# Patient Record
Sex: FEMALE | Employment: OTHER | URBAN - METROPOLITAN AREA
[De-identification: names, ages, dates, MRNs, and addresses within clinical notes are randomized per-mention and may not be internally consistent; named-entity substitution may affect disease eponyms.]

---

## 2017-07-27 RX ORDER — DICLOFENAC SODIUM 10 MG/G
2 GEL TOPICAL 4 TIMES DAILY
Qty: 300 G | Refills: 1 | Status: SHIPPED | OUTPATIENT
Start: 2017-07-27 | End: 2017-12-26 | Stop reason: SDUPTHER

## 2017-07-27 NOTE — TELEPHONE ENCOUNTER
Last Visit: 06/16/2016 with MD Rolan Owens    Next Appointment: noted to f/u prn   Previous Refill Encounters: 09/15/2016 per DEJON Balderas 300g with 1 refill     Requested Prescriptions     Pending Prescriptions Disp Refills    diclofenac (VOLTAREN) 1 % gel 300 g 1     Sig: Apply 2 g to affected area four (4) times daily.

## 2017-12-26 NOTE — TELEPHONE ENCOUNTER
Last Visit: 06/16/2016 with MD Ro Murcia    Next Appointment: noted to f/u prn   Previous Refill Encounters: 07/27/2017 per DEJON Hinsont 300 g with 1 refill     Requested Prescriptions     Pending Prescriptions Disp Refills    diclofenac (VOLTAREN) 1 % gel 300 g 1     Sig: Apply 2 g to affected area four (4) times daily.

## 2017-12-27 RX ORDER — DICLOFENAC SODIUM 10 MG/G
2 GEL TOPICAL 4 TIMES DAILY
Qty: 300 G | Refills: 1 | Status: SHIPPED | OUTPATIENT
Start: 2017-12-27 | End: 2018-07-23 | Stop reason: SDUPTHER

## 2018-07-23 RX ORDER — DICLOFENAC SODIUM 10 MG/G
2 GEL TOPICAL 4 TIMES DAILY
Qty: 300 G | Refills: 1 | Status: SHIPPED | OUTPATIENT
Start: 2018-07-23 | End: 2019-05-15 | Stop reason: SDUPTHER

## 2018-07-23 NOTE — TELEPHONE ENCOUNTER
Last Visit: 06/16/2016 with MD Valentina Espinal    Next Appointment: No show 02/09/2018  Previous Refill Encounters: 12/27/2017 per DEJON Brown 300g with 1 refill    Requested Prescriptions     Pending Prescriptions Disp Refills    diclofenac (VOLTAREN) 1 % gel 300 g 1     Sig: Apply 2 g to affected area four (4) times daily.

## 2018-11-06 ENCOUNTER — HOME HEALTH ADMISSION (OUTPATIENT)
Dept: HOME HEALTH SERVICES | Facility: HOME HEALTH | Age: 58
End: 2018-11-06
Payer: COMMERCIAL

## 2018-11-07 ENCOUNTER — HOME CARE VISIT (OUTPATIENT)
Dept: HOME HEALTH SERVICES | Facility: HOME HEALTH | Age: 58
End: 2018-11-07

## 2018-11-08 ENCOUNTER — HOME CARE VISIT (OUTPATIENT)
Dept: SCHEDULING | Facility: HOME HEALTH | Age: 58
End: 2018-11-08
Payer: COMMERCIAL

## 2018-11-08 PROCEDURE — G0151 HHCP-SERV OF PT,EA 15 MIN: HCPCS

## 2018-11-08 PROCEDURE — 400013 HH SOC

## 2018-11-09 ENCOUNTER — HOME CARE VISIT (OUTPATIENT)
Dept: HOME HEALTH SERVICES | Facility: HOME HEALTH | Age: 58
End: 2018-11-09
Payer: COMMERCIAL

## 2018-11-09 VITALS
SYSTOLIC BLOOD PRESSURE: 133 MMHG | OXYGEN SATURATION: 96 % | TEMPERATURE: 97.6 F | HEART RATE: 75 BPM | DIASTOLIC BLOOD PRESSURE: 89 MMHG

## 2018-11-13 ENCOUNTER — HOME CARE VISIT (OUTPATIENT)
Dept: SCHEDULING | Facility: HOME HEALTH | Age: 58
End: 2018-11-13
Payer: COMMERCIAL

## 2018-11-13 PROCEDURE — G0157 HHC PT ASSISTANT EA 15: HCPCS

## 2018-11-14 VITALS — OXYGEN SATURATION: 98 % | TEMPERATURE: 97.2 F

## 2018-11-20 ENCOUNTER — HOME CARE VISIT (OUTPATIENT)
Dept: SCHEDULING | Facility: HOME HEALTH | Age: 58
End: 2018-11-20
Payer: COMMERCIAL

## 2018-11-20 PROCEDURE — G0151 HHCP-SERV OF PT,EA 15 MIN: HCPCS

## 2018-11-21 ENCOUNTER — HOME CARE VISIT (OUTPATIENT)
Dept: SCHEDULING | Facility: HOME HEALTH | Age: 58
End: 2018-11-21
Payer: COMMERCIAL

## 2018-11-21 PROCEDURE — G0151 HHCP-SERV OF PT,EA 15 MIN: HCPCS

## 2018-11-22 VITALS
TEMPERATURE: 98.1 F | SYSTOLIC BLOOD PRESSURE: 148 MMHG | OXYGEN SATURATION: 94 % | HEART RATE: 68 BPM | DIASTOLIC BLOOD PRESSURE: 94 MMHG

## 2018-11-23 VITALS — SYSTOLIC BLOOD PRESSURE: 153 MMHG | DIASTOLIC BLOOD PRESSURE: 88 MMHG | HEART RATE: 99 BPM | OXYGEN SATURATION: 98 %

## 2018-11-26 ENCOUNTER — HOME CARE VISIT (OUTPATIENT)
Dept: SCHEDULING | Facility: HOME HEALTH | Age: 58
End: 2018-11-26
Payer: COMMERCIAL

## 2018-11-26 VITALS — SYSTOLIC BLOOD PRESSURE: 135 MMHG | DIASTOLIC BLOOD PRESSURE: 82 MMHG | HEART RATE: 72 BPM | OXYGEN SATURATION: 95 %

## 2018-11-26 PROCEDURE — G0157 HHC PT ASSISTANT EA 15: HCPCS

## 2018-11-29 ENCOUNTER — HOME CARE VISIT (OUTPATIENT)
Dept: SCHEDULING | Facility: HOME HEALTH | Age: 58
End: 2018-11-29
Payer: COMMERCIAL

## 2018-11-29 PROCEDURE — G0157 HHC PT ASSISTANT EA 15: HCPCS

## 2018-11-30 VITALS
DIASTOLIC BLOOD PRESSURE: 80 MMHG | HEART RATE: 68 BPM | SYSTOLIC BLOOD PRESSURE: 140 MMHG | TEMPERATURE: 97.2 F | OXYGEN SATURATION: 98 %

## 2018-12-04 ENCOUNTER — HOME CARE VISIT (OUTPATIENT)
Dept: SCHEDULING | Facility: HOME HEALTH | Age: 58
End: 2018-12-04
Payer: COMMERCIAL

## 2018-12-04 PROCEDURE — G0157 HHC PT ASSISTANT EA 15: HCPCS

## 2018-12-05 VITALS
DIASTOLIC BLOOD PRESSURE: 80 MMHG | OXYGEN SATURATION: 98 % | SYSTOLIC BLOOD PRESSURE: 138 MMHG | HEART RATE: 78 BPM | TEMPERATURE: 97.6 F

## 2018-12-06 ENCOUNTER — HOME CARE VISIT (OUTPATIENT)
Dept: SCHEDULING | Facility: HOME HEALTH | Age: 58
End: 2018-12-06
Payer: COMMERCIAL

## 2018-12-06 VITALS
SYSTOLIC BLOOD PRESSURE: 147 MMHG | TEMPERATURE: 97.4 F | HEART RATE: 78 BPM | OXYGEN SATURATION: 99 % | DIASTOLIC BLOOD PRESSURE: 83 MMHG

## 2018-12-06 PROCEDURE — G0151 HHCP-SERV OF PT,EA 15 MIN: HCPCS

## 2018-12-11 ENCOUNTER — HOME HEALTH ADMISSION (OUTPATIENT)
Dept: HOME HEALTH SERVICES | Facility: HOME HEALTH | Age: 58
End: 2018-12-11

## 2018-12-12 ENCOUNTER — HOME CARE VISIT (OUTPATIENT)
Dept: HOME HEALTH SERVICES | Facility: HOME HEALTH | Age: 58
End: 2018-12-12

## 2019-05-15 NOTE — TELEPHONE ENCOUNTER
Last Visit: 6/16/16 with MD Nirmal Singh  Next Appointment: none  Previous Refill Encounter(s): 7/23/18 #300g with 1 refill    Requested Prescriptions     Pending Prescriptions Disp Refills    diclofenac (VOLTAREN) 1 % gel 300 g 1     Sig: Apply 2 g to affected area four (4) times daily.

## 2019-05-16 RX ORDER — DICLOFENAC SODIUM 10 MG/G
2 GEL TOPICAL 4 TIMES DAILY
Qty: 300 G | Refills: 1 | Status: SHIPPED | OUTPATIENT
Start: 2019-05-16 | End: 2019-12-30

## 2019-08-12 ENCOUNTER — OFFICE VISIT (OUTPATIENT)
Dept: ORTHOPEDIC SURGERY | Facility: CLINIC | Age: 59
End: 2019-08-12

## 2019-08-12 ENCOUNTER — HOSPITAL ENCOUNTER (OUTPATIENT)
Dept: LAB | Age: 59
Discharge: HOME | End: 2019-08-12
Payer: MEDICARE

## 2019-08-12 VITALS
HEART RATE: 91 BPM | RESPIRATION RATE: 18 BRPM | OXYGEN SATURATION: 98 % | BODY MASS INDEX: 34.41 KG/M2 | SYSTOLIC BLOOD PRESSURE: 167 MMHG | HEIGHT: 63 IN | TEMPERATURE: 97.5 F | WEIGHT: 194.2 LBS | DIASTOLIC BLOOD PRESSURE: 89 MMHG

## 2019-08-12 DIAGNOSIS — M25.562 LEFT KNEE PAIN, UNSPECIFIED CHRONICITY: ICD-10-CM

## 2019-08-12 DIAGNOSIS — Z96.652 STATUS POST TOTAL LEFT KNEE REPLACEMENT: Primary | ICD-10-CM

## 2019-08-12 DIAGNOSIS — Z96.652 STATUS POST TOTAL LEFT KNEE REPLACEMENT: ICD-10-CM

## 2019-08-12 LAB
CRP SERPL-MCNC: <0.3 MG/DL (ref 0–0.3)
ERYTHROCYTE [SEDIMENTATION RATE] IN BLOOD: 12 MM/HR (ref 0–30)

## 2019-08-12 PROCEDURE — 85652 RBC SED RATE AUTOMATED: CPT

## 2019-08-12 PROCEDURE — 86140 C-REACTIVE PROTEIN: CPT

## 2019-08-12 PROCEDURE — 83520 IMMUNOASSAY QUANT NOS NONAB: CPT

## 2019-08-12 PROCEDURE — 36415 COLL VENOUS BLD VENIPUNCTURE: CPT
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
## 2019-08-13 LAB — IL6 SERPL-MCNC: 2.1 PG/ML (ref 0–15.5)

## 2019-08-19 ENCOUNTER — DOCUMENTATION ONLY (OUTPATIENT)
Dept: ORTHOPEDIC SURGERY | Facility: CLINIC | Age: 59
End: 2019-08-19

## 2020-01-03 RX ORDER — DICLOFENAC SODIUM 10 MG/G
GEL TOPICAL
Qty: 300 G | Refills: 1 | Status: SHIPPED | OUTPATIENT
Start: 2020-01-03 | End: 2020-07-20

## 2020-07-20 RX ORDER — DICLOFENAC SODIUM 10 MG/G
GEL TOPICAL
Qty: 300 G | Refills: 0 | Status: SHIPPED | OUTPATIENT
Start: 2020-07-20 | End: 2021-01-29

## 2021-01-29 RX ORDER — DICLOFENAC SODIUM 10 MG/G
GEL TOPICAL
Qty: 100 G | Refills: 2 | Status: SHIPPED | OUTPATIENT
Start: 2021-01-29

## 2023-07-11 ENCOUNTER — APPOINTMENT (OUTPATIENT)
Facility: HOSPITAL | Age: 63
DRG: 494 | End: 2023-07-11
Payer: MEDICARE

## 2023-07-11 ENCOUNTER — ANESTHESIA EVENT (OUTPATIENT)
Facility: HOSPITAL | Age: 63
End: 2023-07-11
Payer: MEDICARE

## 2023-07-11 ENCOUNTER — ANESTHESIA (OUTPATIENT)
Facility: HOSPITAL | Age: 63
End: 2023-07-11
Payer: MEDICARE

## 2023-07-11 ENCOUNTER — HOSPITAL ENCOUNTER (INPATIENT)
Facility: HOSPITAL | Age: 63
LOS: 22 days | Discharge: NURSING, REHAB OR LONG-TERM CARE | DRG: 494 | End: 2023-08-02
Attending: EMERGENCY MEDICINE | Admitting: INTERNAL MEDICINE
Payer: MEDICARE

## 2023-07-11 DIAGNOSIS — S82.831A CLOSED FRACTURE OF PROXIMAL END OF RIGHT FIBULA, UNSPECIFIED FRACTURE MORPHOLOGY, INITIAL ENCOUNTER: ICD-10-CM

## 2023-07-11 DIAGNOSIS — S82.301A CLOSED FRACTURE OF DISTAL END OF RIGHT TIBIA, UNSPECIFIED FRACTURE MORPHOLOGY, INITIAL ENCOUNTER: Primary | ICD-10-CM

## 2023-07-11 DIAGNOSIS — R55 SYNCOPE AND COLLAPSE: ICD-10-CM

## 2023-07-11 PROBLEM — S82.301K: Status: ACTIVE | Noted: 2023-07-11

## 2023-07-11 PROBLEM — M54.12 SPINAL STENOSIS OF CERVICAL REGION WITH RADICULOPATHY: Status: ACTIVE | Noted: 2023-07-11

## 2023-07-11 PROBLEM — M48.02 SPINAL STENOSIS OF CERVICAL REGION WITH RADICULOPATHY: Status: ACTIVE | Noted: 2023-07-11

## 2023-07-11 LAB
ABO + RH BLD: NORMAL
ALBUMIN SERPL-MCNC: 4.1 G/DL (ref 3.4–5)
ALBUMIN/GLOB SERPL: 1.2 (ref 0.8–1.7)
ALP SERPL-CCNC: 82 U/L (ref 45–117)
ALT SERPL-CCNC: 32 U/L (ref 13–56)
ANION GAP SERPL CALC-SCNC: 5 MMOL/L (ref 3–18)
AST SERPL-CCNC: 26 U/L (ref 10–38)
BASOPHILS # BLD: 0 K/UL (ref 0–0.1)
BASOPHILS NFR BLD: 1 % (ref 0–2)
BILIRUB SERPL-MCNC: 0.6 MG/DL (ref 0.2–1)
BLOOD GROUP ANTIBODIES SERPL: NORMAL
BUN SERPL-MCNC: 12 MG/DL (ref 7–18)
BUN/CREAT SERPL: 17 (ref 12–20)
CALCIUM SERPL-MCNC: 9.9 MG/DL (ref 8.5–10.1)
CHLORIDE SERPL-SCNC: 105 MMOL/L (ref 100–111)
CO2 SERPL-SCNC: 32 MMOL/L (ref 21–32)
CREAT SERPL-MCNC: 0.7 MG/DL (ref 0.6–1.3)
DIFFERENTIAL METHOD BLD: ABNORMAL
EKG ATRIAL RATE: 66 BPM
EKG DIAGNOSIS: NORMAL
EKG P AXIS: 59 DEGREES
EKG P-R INTERVAL: 172 MS
EKG Q-T INTERVAL: 414 MS
EKG QRS DURATION: 76 MS
EKG QTC CALCULATION (BAZETT): 434 MS
EKG R AXIS: -34 DEGREES
EKG T AXIS: 50 DEGREES
EKG VENTRICULAR RATE: 66 BPM
EOSINOPHIL # BLD: 0.2 K/UL (ref 0–0.4)
EOSINOPHIL NFR BLD: 2 % (ref 0–5)
ERYTHROCYTE [DISTWIDTH] IN BLOOD BY AUTOMATED COUNT: 15.4 % (ref 11.6–14.5)
GLOBULIN SER CALC-MCNC: 3.3 G/DL (ref 2–4)
GLUCOSE SERPL-MCNC: 118 MG/DL (ref 74–99)
HCT VFR BLD AUTO: 41.4 % (ref 35–45)
HGB BLD-MCNC: 13 G/DL (ref 12–16)
IMM GRANULOCYTES # BLD AUTO: 0 K/UL (ref 0–0.04)
IMM GRANULOCYTES NFR BLD AUTO: 0 % (ref 0–0.5)
LYMPHOCYTES # BLD: 2.1 K/UL (ref 0.9–3.6)
LYMPHOCYTES NFR BLD: 33 % (ref 21–52)
MCH RBC QN AUTO: 22.2 PG (ref 24–34)
MCHC RBC AUTO-ENTMCNC: 31.4 G/DL (ref 31–37)
MCV RBC AUTO: 70.6 FL (ref 78–100)
MONOCYTES # BLD: 0.4 K/UL (ref 0.05–1.2)
MONOCYTES NFR BLD: 6 % (ref 3–10)
NEUTS SEG # BLD: 3.7 K/UL (ref 1.8–8)
NEUTS SEG NFR BLD: 58 % (ref 40–73)
NRBC # BLD: 0 K/UL (ref 0–0.01)
NRBC BLD-RTO: 0 PER 100 WBC
PLATELET # BLD AUTO: 218 K/UL (ref 135–420)
PMV BLD AUTO: 11.6 FL (ref 9.2–11.8)
POTASSIUM SERPL-SCNC: 3.5 MMOL/L (ref 3.5–5.5)
PROT SERPL-MCNC: 7.4 G/DL (ref 6.4–8.2)
RBC # BLD AUTO: 5.86 M/UL (ref 4.2–5.3)
SODIUM SERPL-SCNC: 142 MMOL/L (ref 136–145)
SPECIMEN EXP DATE BLD: NORMAL
TROPONIN I SERPL HS-MCNC: 5 NG/L (ref 0–54)
WBC # BLD AUTO: 6.3 K/UL (ref 4.6–13.2)

## 2023-07-11 PROCEDURE — 80053 COMPREHEN METABOLIC PANEL: CPT

## 2023-07-11 PROCEDURE — 2709999900 HC NON-CHARGEABLE SUPPLY: Performed by: ORTHOPAEDIC SURGERY

## 2023-07-11 PROCEDURE — 93005 ELECTROCARDIOGRAM TRACING: CPT | Performed by: STUDENT IN AN ORGANIZED HEALTH CARE EDUCATION/TRAINING PROGRAM

## 2023-07-11 PROCEDURE — 99222 1ST HOSP IP/OBS MODERATE 55: CPT | Performed by: PHYSICIAN ASSISTANT

## 2023-07-11 PROCEDURE — 2580000003 HC RX 258: Performed by: PHYSICIAN ASSISTANT

## 2023-07-11 PROCEDURE — 2720000010 HC SURG SUPPLY STERILE: Performed by: ORTHOPAEDIC SURGERY

## 2023-07-11 PROCEDURE — 73610 X-RAY EXAM OF ANKLE: CPT

## 2023-07-11 PROCEDURE — 71045 X-RAY EXAM CHEST 1 VIEW: CPT

## 2023-07-11 PROCEDURE — 6360000002 HC RX W HCPCS: Performed by: ORTHOPAEDIC SURGERY

## 2023-07-11 PROCEDURE — C1713 ANCHOR/SCREW BN/BN,TIS/BN: HCPCS | Performed by: ORTHOPAEDIC SURGERY

## 2023-07-11 PROCEDURE — 0QSJ05Z REPOSITION RIGHT FIBULA WITH EXTERNAL FIXATION DEVICE, OPEN APPROACH: ICD-10-PCS | Performed by: ORTHOPAEDIC SURGERY

## 2023-07-11 PROCEDURE — 3600000002 HC SURGERY LEVEL 2 BASE: Performed by: ORTHOPAEDIC SURGERY

## 2023-07-11 PROCEDURE — 84484 ASSAY OF TROPONIN QUANT: CPT

## 2023-07-11 PROCEDURE — 1100000000 HC RM PRIVATE

## 2023-07-11 PROCEDURE — 86850 RBC ANTIBODY SCREEN: CPT

## 2023-07-11 PROCEDURE — 0QSG05Z REPOSITION RIGHT TIBIA WITH EXTERNAL FIXATION DEVICE, OPEN APPROACH: ICD-10-PCS | Performed by: ORTHOPAEDIC SURGERY

## 2023-07-11 PROCEDURE — 73590 X-RAY EXAM OF LOWER LEG: CPT

## 2023-07-11 PROCEDURE — 73700 CT LOWER EXTREMITY W/O DYE: CPT

## 2023-07-11 PROCEDURE — 20692 APPL MLTPLN UNI EXT FIXJ SYS: CPT | Performed by: ORTHOPAEDIC SURGERY

## 2023-07-11 PROCEDURE — 86900 BLOOD TYPING SEROLOGIC ABO: CPT

## 2023-07-11 PROCEDURE — 99223 1ST HOSP IP/OBS HIGH 75: CPT | Performed by: ORTHOPAEDIC SURGERY

## 2023-07-11 PROCEDURE — 6360000002 HC RX W HCPCS: Performed by: PHYSICIAN ASSISTANT

## 2023-07-11 PROCEDURE — 73600 X-RAY EXAM OF ANKLE: CPT

## 2023-07-11 PROCEDURE — 6360000002 HC RX W HCPCS: Performed by: NURSE ANESTHETIST, CERTIFIED REGISTERED

## 2023-07-11 PROCEDURE — 93010 ELECTROCARDIOGRAM REPORT: CPT | Performed by: INTERNAL MEDICINE

## 2023-07-11 PROCEDURE — 27752 TREATMENT OF TIBIA FRACTURE: CPT | Performed by: ORTHOPAEDIC SURGERY

## 2023-07-11 PROCEDURE — 85025 COMPLETE CBC W/AUTO DIFF WBC: CPT

## 2023-07-11 PROCEDURE — 2580000003 HC RX 258: Performed by: ORTHOPAEDIC SURGERY

## 2023-07-11 PROCEDURE — 3700000001 HC ADD 15 MINUTES (ANESTHESIA): Performed by: ORTHOPAEDIC SURGERY

## 2023-07-11 PROCEDURE — 2500000003 HC RX 250 WO HCPCS: Performed by: NURSE ANESTHETIST, CERTIFIED REGISTERED

## 2023-07-11 PROCEDURE — 6370000000 HC RX 637 (ALT 250 FOR IP): Performed by: PHYSICIAN ASSISTANT

## 2023-07-11 PROCEDURE — 3600000012 HC SURGERY LEVEL 2 ADDTL 15MIN: Performed by: ORTHOPAEDIC SURGERY

## 2023-07-11 PROCEDURE — 3700000000 HC ANESTHESIA ATTENDED CARE: Performed by: ORTHOPAEDIC SURGERY

## 2023-07-11 PROCEDURE — 7100000001 HC PACU RECOVERY - ADDTL 15 MIN: Performed by: ORTHOPAEDIC SURGERY

## 2023-07-11 PROCEDURE — 99285 EMERGENCY DEPT VISIT HI MDM: CPT

## 2023-07-11 PROCEDURE — 94761 N-INVAS EAR/PLS OXIMETRY MLT: CPT

## 2023-07-11 PROCEDURE — 7100000000 HC PACU RECOVERY - FIRST 15 MIN: Performed by: ORTHOPAEDIC SURGERY

## 2023-07-11 PROCEDURE — 86901 BLOOD TYPING SEROLOGIC RH(D): CPT

## 2023-07-11 DEVICE — PIN EXT FIX L250MM DIA5MM S STL W/ CTRL THRD STNMN: Type: IMPLANTABLE DEVICE | Site: TIBIA | Status: FUNCTIONAL

## 2023-07-11 RX ORDER — FENTANYL CITRATE 50 UG/ML
INJECTION, SOLUTION INTRAMUSCULAR; INTRAVENOUS PRN
Status: DISCONTINUED | OUTPATIENT
Start: 2023-07-11 | End: 2023-07-11 | Stop reason: SDUPTHER

## 2023-07-11 RX ORDER — NEOSTIGMINE METHYLSULFATE 1 MG/ML
INJECTION, SOLUTION INTRAVENOUS PRN
Status: DISCONTINUED | OUTPATIENT
Start: 2023-07-11 | End: 2023-07-11 | Stop reason: SDUPTHER

## 2023-07-11 RX ORDER — SODIUM CHLORIDE 9 MG/ML
INJECTION, SOLUTION INTRAVENOUS PRN
Status: DISCONTINUED | OUTPATIENT
Start: 2023-07-11 | End: 2023-07-12

## 2023-07-11 RX ORDER — ONDANSETRON 2 MG/ML
4 INJECTION INTRAMUSCULAR; INTRAVENOUS EVERY 6 HOURS PRN
Status: DISCONTINUED | OUTPATIENT
Start: 2023-07-11 | End: 2023-07-11

## 2023-07-11 RX ORDER — FENTANYL CITRATE 50 UG/ML
50 INJECTION, SOLUTION INTRAMUSCULAR; INTRAVENOUS EVERY 5 MIN PRN
Status: DISCONTINUED | OUTPATIENT
Start: 2023-07-11 | End: 2023-07-11 | Stop reason: HOSPADM

## 2023-07-11 RX ORDER — ONDANSETRON 2 MG/ML
4 INJECTION INTRAMUSCULAR; INTRAVENOUS EVERY 6 HOURS PRN
Status: DISCONTINUED | OUTPATIENT
Start: 2023-07-11 | End: 2023-07-27

## 2023-07-11 RX ORDER — ONDANSETRON 2 MG/ML
INJECTION INTRAMUSCULAR; INTRAVENOUS PRN
Status: DISCONTINUED | OUTPATIENT
Start: 2023-07-11 | End: 2023-07-11 | Stop reason: SDUPTHER

## 2023-07-11 RX ORDER — POLYETHYLENE GLYCOL 3350 17 G/17G
17 POWDER, FOR SOLUTION ORAL DAILY PRN
Status: DISCONTINUED | OUTPATIENT
Start: 2023-07-11 | End: 2023-08-02 | Stop reason: HOSPADM

## 2023-07-11 RX ORDER — TERIFLUNOMIDE 14 MG/1
14 TABLET, FILM COATED ORAL DAILY
COMMUNITY

## 2023-07-11 RX ORDER — SODIUM CHLORIDE 9 MG/ML
INJECTION, SOLUTION INTRAVENOUS PRN
Status: DISCONTINUED | OUTPATIENT
Start: 2023-07-11 | End: 2023-07-11 | Stop reason: HOSPADM

## 2023-07-11 RX ORDER — DEXAMETHASONE SODIUM PHOSPHATE 4 MG/ML
INJECTION, SOLUTION INTRA-ARTICULAR; INTRALESIONAL; INTRAMUSCULAR; INTRAVENOUS; SOFT TISSUE PRN
Status: DISCONTINUED | OUTPATIENT
Start: 2023-07-11 | End: 2023-07-11 | Stop reason: SDUPTHER

## 2023-07-11 RX ORDER — GABAPENTIN 300 MG/1
300 CAPSULE ORAL 2 TIMES DAILY
Status: DISCONTINUED | OUTPATIENT
Start: 2023-07-11 | End: 2023-08-02 | Stop reason: HOSPADM

## 2023-07-11 RX ORDER — LIDOCAINE HYDROCHLORIDE 20 MG/ML
INJECTION, SOLUTION EPIDURAL; INFILTRATION; INTRACAUDAL; PERINEURAL PRN
Status: DISCONTINUED | OUTPATIENT
Start: 2023-07-11 | End: 2023-07-11 | Stop reason: SDUPTHER

## 2023-07-11 RX ORDER — SODIUM CHLORIDE, SODIUM LACTATE, POTASSIUM CHLORIDE, CALCIUM CHLORIDE 600; 310; 30; 20 MG/100ML; MG/100ML; MG/100ML; MG/100ML
INJECTION, SOLUTION INTRAVENOUS CONTINUOUS
Status: DISCONTINUED | OUTPATIENT
Start: 2023-07-11 | End: 2023-07-11

## 2023-07-11 RX ORDER — SODIUM CHLORIDE 0.9 % (FLUSH) 0.9 %
5-40 SYRINGE (ML) INJECTION PRN
Status: DISCONTINUED | OUTPATIENT
Start: 2023-07-11 | End: 2023-07-11 | Stop reason: HOSPADM

## 2023-07-11 RX ORDER — BISACODYL 10 MG
10 SUPPOSITORY, RECTAL RECTAL DAILY PRN
Status: DISCONTINUED | OUTPATIENT
Start: 2023-07-11 | End: 2023-08-02 | Stop reason: HOSPADM

## 2023-07-11 RX ORDER — ONDANSETRON 2 MG/ML
4 INJECTION INTRAMUSCULAR; INTRAVENOUS
Status: DISCONTINUED | OUTPATIENT
Start: 2023-07-11 | End: 2023-07-11 | Stop reason: HOSPADM

## 2023-07-11 RX ORDER — ONDANSETRON 4 MG/1
4 TABLET, ORALLY DISINTEGRATING ORAL EVERY 8 HOURS PRN
Status: DISCONTINUED | OUTPATIENT
Start: 2023-07-11 | End: 2023-07-27

## 2023-07-11 RX ORDER — TERIFLUNOMIDE 14 MG/1
14 TABLET, FILM COATED ORAL DAILY
Status: DISCONTINUED | OUTPATIENT
Start: 2023-07-12 | End: 2023-08-02 | Stop reason: HOSPADM

## 2023-07-11 RX ORDER — SODIUM CHLORIDE 0.9 % (FLUSH) 0.9 %
5-40 SYRINGE (ML) INJECTION EVERY 12 HOURS SCHEDULED
Status: DISCONTINUED | OUTPATIENT
Start: 2023-07-11 | End: 2023-07-27

## 2023-07-11 RX ORDER — ACETAMINOPHEN 650 MG/1
650 SUPPOSITORY RECTAL EVERY 6 HOURS PRN
Status: DISCONTINUED | OUTPATIENT
Start: 2023-07-11 | End: 2023-08-02 | Stop reason: HOSPADM

## 2023-07-11 RX ORDER — ATORVASTATIN CALCIUM 40 MG/1
80 TABLET, FILM COATED ORAL NIGHTLY
Status: DISCONTINUED | OUTPATIENT
Start: 2023-07-11 | End: 2023-08-02 | Stop reason: HOSPADM

## 2023-07-11 RX ORDER — CHLORTHALIDONE 25 MG/1
25 TABLET ORAL DAILY
Status: ON HOLD | COMMUNITY
End: 2023-08-02 | Stop reason: HOSPADM

## 2023-07-11 RX ORDER — MORPHINE SULFATE 2 MG/ML
2 INJECTION, SOLUTION INTRAMUSCULAR; INTRAVENOUS EVERY 4 HOURS PRN
Status: DISCONTINUED | OUTPATIENT
Start: 2023-07-11 | End: 2023-08-02 | Stop reason: HOSPADM

## 2023-07-11 RX ORDER — GABAPENTIN 300 MG/1
300 CAPSULE ORAL 2 TIMES DAILY
Status: ON HOLD | COMMUNITY
End: 2023-08-02 | Stop reason: HOSPADM

## 2023-07-11 RX ORDER — ROCURONIUM BROMIDE 10 MG/ML
INJECTION, SOLUTION INTRAVENOUS PRN
Status: DISCONTINUED | OUTPATIENT
Start: 2023-07-11 | End: 2023-07-11 | Stop reason: SDUPTHER

## 2023-07-11 RX ORDER — MIDAZOLAM HYDROCHLORIDE 1 MG/ML
INJECTION INTRAMUSCULAR; INTRAVENOUS PRN
Status: DISCONTINUED | OUTPATIENT
Start: 2023-07-11 | End: 2023-07-11 | Stop reason: SDUPTHER

## 2023-07-11 RX ORDER — SODIUM CHLORIDE 0.9 % (FLUSH) 0.9 %
5-40 SYRINGE (ML) INJECTION PRN
Status: DISCONTINUED | OUTPATIENT
Start: 2023-07-11 | End: 2023-08-02 | Stop reason: HOSPADM

## 2023-07-11 RX ORDER — SODIUM CHLORIDE, SODIUM LACTATE, POTASSIUM CHLORIDE, CALCIUM CHLORIDE 600; 310; 30; 20 MG/100ML; MG/100ML; MG/100ML; MG/100ML
INJECTION, SOLUTION INTRAVENOUS CONTINUOUS
Status: DISCONTINUED | OUTPATIENT
Start: 2023-07-11 | End: 2023-07-12

## 2023-07-11 RX ORDER — PROPOFOL 10 MG/ML
INJECTION, EMULSION INTRAVENOUS PRN
Status: DISCONTINUED | OUTPATIENT
Start: 2023-07-11 | End: 2023-07-11 | Stop reason: SDUPTHER

## 2023-07-11 RX ORDER — ONDANSETRON 4 MG/1
4 TABLET, ORALLY DISINTEGRATING ORAL EVERY 8 HOURS PRN
Status: DISCONTINUED | OUTPATIENT
Start: 2023-07-11 | End: 2023-07-11

## 2023-07-11 RX ORDER — SODIUM CHLORIDE 0.9 % (FLUSH) 0.9 %
5-40 SYRINGE (ML) INJECTION EVERY 12 HOURS SCHEDULED
Status: DISCONTINUED | OUTPATIENT
Start: 2023-07-11 | End: 2023-07-11 | Stop reason: HOSPADM

## 2023-07-11 RX ORDER — GLYCOPYRROLATE 0.2 MG/ML
INJECTION INTRAMUSCULAR; INTRAVENOUS PRN
Status: DISCONTINUED | OUTPATIENT
Start: 2023-07-11 | End: 2023-07-11 | Stop reason: SDUPTHER

## 2023-07-11 RX ORDER — METOPROLOL SUCCINATE 100 MG/1
100 TABLET, EXTENDED RELEASE ORAL DAILY
COMMUNITY

## 2023-07-11 RX ORDER — PROCHLORPERAZINE EDISYLATE 5 MG/ML
10 INJECTION INTRAMUSCULAR; INTRAVENOUS
Status: DISCONTINUED | OUTPATIENT
Start: 2023-07-11 | End: 2023-07-11 | Stop reason: HOSPADM

## 2023-07-11 RX ORDER — ACETAMINOPHEN 325 MG/1
650 TABLET ORAL EVERY 6 HOURS PRN
Status: DISCONTINUED | OUTPATIENT
Start: 2023-07-11 | End: 2023-08-02 | Stop reason: HOSPADM

## 2023-07-11 RX ORDER — GABAPENTIN 100 MG/1
100 CAPSULE ORAL 3 TIMES DAILY
Status: DISCONTINUED | OUTPATIENT
Start: 2023-07-11 | End: 2023-08-02 | Stop reason: HOSPADM

## 2023-07-11 RX ORDER — ENOXAPARIN SODIUM 100 MG/ML
40 INJECTION SUBCUTANEOUS DAILY
Status: DISCONTINUED | OUTPATIENT
Start: 2023-07-12 | End: 2023-07-26 | Stop reason: SDUPTHER

## 2023-07-11 RX ORDER — METOPROLOL SUCCINATE 50 MG/1
100 TABLET, EXTENDED RELEASE ORAL DAILY
Status: DISCONTINUED | OUTPATIENT
Start: 2023-07-12 | End: 2023-08-02 | Stop reason: HOSPADM

## 2023-07-11 RX ADMIN — DEXAMETHASONE SODIUM PHOSPHATE 4 MG: 4 INJECTION, SOLUTION INTRAMUSCULAR; INTRAVENOUS at 19:29

## 2023-07-11 RX ADMIN — ONDANSETRON 4 MG: 2 INJECTION INTRAMUSCULAR; INTRAVENOUS at 19:28

## 2023-07-11 RX ADMIN — CEFAZOLIN 2000 MG: 1 INJECTION, POWDER, FOR SOLUTION INTRAMUSCULAR; INTRAVENOUS at 23:58

## 2023-07-11 RX ADMIN — SODIUM CHLORIDE, PRESERVATIVE FREE 10 ML: 5 INJECTION INTRAVENOUS at 22:43

## 2023-07-11 RX ADMIN — MIDAZOLAM 2 MG: 1 INJECTION, SOLUTION INTRAMUSCULAR; INTRAVENOUS at 18:22

## 2023-07-11 RX ADMIN — WATER 2000 MG: 1 INJECTION, SOLUTION INTRAMUSCULAR; INTRAVENOUS; SUBCUTANEOUS at 18:42

## 2023-07-11 RX ADMIN — FENTANYL CITRATE 50 MCG: 50 INJECTION, SOLUTION INTRAMUSCULAR; INTRAVENOUS at 21:02

## 2023-07-11 RX ADMIN — FENTANYL CITRATE 50 MCG: 50 INJECTION INTRAMUSCULAR; INTRAVENOUS at 18:36

## 2023-07-11 RX ADMIN — LIDOCAINE HYDROCHLORIDE 100 MG: 20 INJECTION, SOLUTION EPIDURAL; INFILTRATION; INTRACAUDAL; PERINEURAL at 18:36

## 2023-07-11 RX ADMIN — SODIUM CHLORIDE, SODIUM LACTATE, POTASSIUM CHLORIDE, AND CALCIUM CHLORIDE: 600; 310; 30; 20 INJECTION, SOLUTION INTRAVENOUS at 22:37

## 2023-07-11 RX ADMIN — FENTANYL CITRATE 50 MCG: 50 INJECTION INTRAMUSCULAR; INTRAVENOUS at 18:57

## 2023-07-11 RX ADMIN — ROCURONIUM BROMIDE 40 MG: 10 INJECTION, SOLUTION INTRAVENOUS at 18:36

## 2023-07-11 RX ADMIN — GLYCOPYRROLATE 0.4 MG: 0.2 INJECTION, SOLUTION INTRAMUSCULAR; INTRAVENOUS at 19:27

## 2023-07-11 RX ADMIN — SODIUM CHLORIDE, SODIUM LACTATE, POTASSIUM CHLORIDE, AND CALCIUM CHLORIDE: 600; 310; 30; 20 INJECTION, SOLUTION INTRAVENOUS at 18:07

## 2023-07-11 RX ADMIN — NEOSTIGMINE METHYLSULFATE 3 MG: 1 INJECTION, SOLUTION INTRAVENOUS at 19:27

## 2023-07-11 RX ADMIN — ATORVASTATIN CALCIUM 80 MG: 40 TABLET, FILM COATED ORAL at 22:38

## 2023-07-11 RX ADMIN — GABAPENTIN 100 MG: 100 CAPSULE ORAL at 22:42

## 2023-07-11 RX ADMIN — MORPHINE SULFATE 2 MG: 2 INJECTION, SOLUTION INTRAMUSCULAR; INTRAVENOUS at 12:54

## 2023-07-11 RX ADMIN — PROPOFOL 150 MG: 10 INJECTION, EMULSION INTRAVENOUS at 18:36

## 2023-07-11 RX ADMIN — ONDANSETRON 4 MG: 2 INJECTION INTRAMUSCULAR; INTRAVENOUS at 12:57

## 2023-07-11 ASSESSMENT — PAIN DESCRIPTION - LOCATION
LOCATION: LEG
LOCATION: ANKLE

## 2023-07-11 ASSESSMENT — PAIN SCALES - GENERAL
PAINLEVEL_OUTOF10: 3
PAINLEVEL_OUTOF10: 8
PAINLEVEL_OUTOF10: 7
PAINLEVEL_OUTOF10: 8

## 2023-07-11 ASSESSMENT — PAIN DESCRIPTION - DESCRIPTORS
DESCRIPTORS: ACHING
DESCRIPTORS: ACHING

## 2023-07-11 ASSESSMENT — PAIN DESCRIPTION - ORIENTATION
ORIENTATION: RIGHT

## 2023-07-11 ASSESSMENT — PAIN - FUNCTIONAL ASSESSMENT: PAIN_FUNCTIONAL_ASSESSMENT: 0-10

## 2023-07-11 NOTE — CONSULTS
Ortho Brief Note    Patient with right distal tibia fracture, proximal fibula fracture after fall/syncopal episode    -Admit to medicine  -Well padded posterior splint to right leg  -STAT CT scan  -KEEP NPO for now  -Have contacted Dr Lucie Boo for further management of fracture, he will see today, possible surgery (ex fix) later today prior to definitive management    Thank you    Yung Berry

## 2023-07-11 NOTE — PROGRESS NOTES
Saravanan Ortiz has a right distal oblique tibia fracture with an associated proximal fibular fracture. Because of the severity of his fracture, and the potential for medial wound complications, initial treatment for her would consist of: A spanning temporary external fixator so this to allow for soft tissue control minimize the fracture shortening and then once soft tissue are more amenable for intervention, she will require an open reduction internal fixation of her distal tibia fracture. She is currently being admitted to internal medicine, spoke with the ER MD, patient be splinted, a CT scan to be obtained, and then I have already called the operating room to see if can get her done today later this afternoon, 430 timeframe. .    N.p.o., bedrest, Ancef antibiotics on-call to the OR    GUANAKITO Abraham MD  7/11/2023 12:20 PM

## 2023-07-11 NOTE — ED PROVIDER NOTES
HPI/ED Course/MDM    Medical Decision Making  Amount and/or Complexity of Data Reviewed  Labs: ordered. Radiology: ordered. Decision-making details documented in ED Course. ECG/medicine tests: ordered. Risk  Decision regarding hospitalization. ED Course as of 07/11/23 1209   Tue Jul 11, 2023   9409 This is a 62-year-old female with history of multiple sclerosis presents after a ground-level fall resulting in an ankle injury. She does not have a great recollection of the fall, just tells me that she remembers being on the ground calling for her  to help her. She denies chest pain, shortness of breath, headache, weakness at any point today. Here, mildly hypertensive, has a history of the same. Otherwise vital signs within normal limits. She is alert and oriented with a nonfocal neurological exam.  She has a benign cardiopulmonary exam.  She has tenderness over her distal right tib-fib/ankle, I have high suspicion for fracture of this area. In the setting of her unwitnessed fall that may not be mechanical in nature, will obtain labs, EKG, troponin for possible syncope. She does not have any focal neurological deficits, headache, vision changes, confusion that would raise my suspicion for intracranial process therefore I do not believe that CT head is warranted at this time. She does not currently have any signs/sxs that raise my suspicion for an MS flare. [MP]   1131 XR TIBIA FIBULA RIGHT (2 VIEWS)  Proximal fibula, distal tibia fracture.  Consutled ortho- recommending long leg splint w/ likely operative repair this week [MP]   7755 Patient admitted to Dr. Ayesha Jones w/ ortho taking to OR this evening for ex-fix [MP]      ED Course User Index  [MP] Rick Ny MD       Past Medical History:   Diagnosis Date    Anemia     Hypertension     Liver injury     MS (multiple sclerosis) ([address])     Multiple sclerosis ([address])     Other unknown and unspecified cause of morbidity or mortality     PE over distal right tib-fib and ankle. Also has some tenderness over proximal fibula. Can wiggle toes and sensation to light touch is intact in her toes. She has a strong DP pulse on her right lower extremity.   Otherwise normal musculoskeletal exam.  Neuro: Alert and oriented with no focal neurological deficits  Skin: Warm, dry, no rash      Procedures                                   Chong Saul MD  Emergency Medicine PGY-4        Neda Ogden MD  Resident  07/11/23 0653

## 2023-07-11 NOTE — ED TRIAGE NOTES
Pt arrived via EMS from home for reported fall. Per EMS patient fell coming out of the bathroom and complaining of right lower leg/ankle pain. Per EMS deformity noted to right lower leg and ankle. Pt has history of MS and TBI per EMS. Pt given 50 mcg of nasal fentanyl enroute by EMS.

## 2023-07-11 NOTE — H&P (VIEW-ONLY)
than 60%, exchange simple mask to Nasal cannula when FiO2 requirements  are less than 40%, and discontinue Nasal cannula supplemental oxygen delivery when SpO2 goal is met with less than 2 liters of Oxygen/min and/or FiO2 requirement is less than 28%. Encourage patients to take deep breaths and position patient in Fowlers position (45-60 degrees) if possible/applicable. Standing Status:   Standing     Number of Occurrences:   1    Simple face mask oxygen     Notify provider if SpO2 below goal     Standing Status:   Standing     Number of Occurrences:   4     Order Specific Question:   Initial Flow Rate (L/MIN), DO NOT ORDER LESS THAN 6 L/MIN     Answer:   6     Order Specific Question:   Sp02 Goal (%): Answer:   95    EKG 12 Lead     Standing Status:   Standing     Number of Occurrences:   1     Order Specific Question:   Reason for Exam?     Answer:   Syncope    TYPE AND SCREEN     Specimen is valid for 3 days - nurse to verify valid specimen     Standing Status:   Standing     Number of Occurrences:   1    Discontinue IV     Prior to discharge if patient is going home. Standing Status:   Standing     Number of Occurrences:   1    ADMIT TO INPATIENT     Standing Status:   Standing     Number of Occurrences:   1     Order Specific Question:   Discharge Plan:     Answer:   Home with Office Follow-up     Order Specific Question:   Telemetry/Cardiac Monitoring Required?      Answer:   No    sodium chloride flush 0.9 % injection 5-40 mL    sodium chloride flush 0.9 % injection 5-40 mL    0.9 % sodium chloride infusion    enoxaparin (LOVENOX) injection 40 mg     Order Specific Question:   Indication of Use     Answer:   Prophylaxis-DVT/PE    OR Linked Order Group     ondansetron (ZOFRAN-ODT) disintegrating tablet 4 mg     ondansetron (ZOFRAN) injection 4 mg    polyethylene glycol (GLYCOLAX) packet 17 g    bisacodyl (DULCOLAX) suppository 10 mg    OR Linked Order Group     acetaminophen (TYLENOL) tablet PRESSURE AND PULSE    Cardiac monitoring    PROCEDURE CONSENT    Vital signs per unit routine    Notify anesthesia provider    Phase I - warming device    Phase I - cardiac monitor    Phase I & II - neurological checks    Nursing communication - Discharge from PACU    Encourage deep breathing and coughing    Continuous Pulse Oximetry    Phase I - bedrest    Full code    Initiate Oxygen Therapy Protocol    Initiate PACU Oxygen Therapy Protocol    Simple face mask oxygen    EKG 12 Lead    TYPE AND SCREEN    Discontinue IV    ADMIT TO INPATIENT              Again, the alternatives, risks, and complications, as well as expected outcome were discussed. The patient understands and agrees to proceed with the above listed surgery. Patient has been given Hibiclens wash with instructions and prescriptions and or orders listed above. Avis Edwards MD  7/11/2023  7:57 PM      This note was completed using voice recognition software.  Any typographical/name errors or mistakes are unintentional.
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
## 2023-07-11 NOTE — H&P
additional details. XR ANKLE RIGHT (MIN 3 VIEWS)    Result Date: 7/11/2023  History: Pain. Fracture. COMPARISON: None. TECHNIQUE: Frontal and lateral views of the right ankle. FINDINGS: There is acute spiral fracture of the distal tibia with medial lateral displacement of approximately 1.3 cm and anterior displacement of approximately 8.5 mm. Tiny ossific densities at the talonavicular articulation dorsally which is presumably degenerative. Small ossific fragment adjacent to the medial malleolus . Potential similar finding adjacent to the lateral malleolus. Soft tissue swelling noted about the ankle. Acute fracture of the tibial shaft. Question acute fracture of the medial malleolus. Potential acute fracture of the lateral malleolus. Correlate with point tenderness. Small ossific density at the talonavicular articulation presumed degenerative. Correlate with point tenderness. Associated extensive soft tissue edema. Please see report for additional details. XR CHEST PORTABLE    Result Date: 7/11/2023  History: Pre-op COMPARISON: August 2015. TECHNIQUE: Frontal view chest. FINDINGS: Lungs grossly clear. Cardiac silhouette unremarkable. Coils noted overlying the left upper quadrant. No acute bone findings. Telemetry leads noted. No acute findings. 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  Bedrest until then  - PT/OT when cleared by ortho  - PRN pain control  - NPO, IVF    Cervical stenosis with radiculopathy  - continue home gabapentin regimen     MS  - Aubagio    HTN  - cont metoprolol, resume chlorthalidone if needed        Anticipated Discharge: 2 days    DVT Prophylaxis:  [x]Lovenox  []Hep SQ  []SCDs  []Coumadin []DOAC  []On Heparin gtt     I have personally reviewed all pertinent labs, films and EKGs that have officially resulted. I reviewed available electronic documentation outlining the initial presentation as well as the emergency room physician's encounter.    Time spent reviewing records, independently interpreting results, obtaining history from patient or caregiver, performing physical exam, ordering tests and medications, communicating with specialists, documenting in the chart, and coordinating overall care is 60 minutes    FEDE Bernstein

## 2023-07-12 ENCOUNTER — TELEPHONE (OUTPATIENT)
Age: 63
End: 2023-07-12

## 2023-07-12 LAB
ANION GAP SERPL CALC-SCNC: 6 MMOL/L (ref 3–18)
BASOPHILS # BLD: 0 K/UL (ref 0–0.1)
BASOPHILS NFR BLD: 0 % (ref 0–2)
BUN SERPL-MCNC: 8 MG/DL (ref 7–18)
BUN/CREAT SERPL: 13 (ref 12–20)
CALCIUM SERPL-MCNC: 9.1 MG/DL (ref 8.5–10.1)
CHLORIDE SERPL-SCNC: 104 MMOL/L (ref 100–111)
CO2 SERPL-SCNC: 28 MMOL/L (ref 21–32)
CREAT SERPL-MCNC: 0.64 MG/DL (ref 0.6–1.3)
DIFFERENTIAL METHOD BLD: ABNORMAL
EOSINOPHIL # BLD: 0 K/UL (ref 0–0.4)
EOSINOPHIL NFR BLD: 0 % (ref 0–5)
ERYTHROCYTE [DISTWIDTH] IN BLOOD BY AUTOMATED COUNT: 15.2 % (ref 11.6–14.5)
GLUCOSE SERPL-MCNC: 141 MG/DL (ref 74–99)
HCT VFR BLD AUTO: 40.5 % (ref 35–45)
HGB BLD-MCNC: 13.1 G/DL (ref 12–16)
IMM GRANULOCYTES # BLD AUTO: 0 K/UL (ref 0–0.04)
IMM GRANULOCYTES NFR BLD AUTO: 0 % (ref 0–0.5)
LYMPHOCYTES # BLD: 0.8 K/UL (ref 0.9–3.6)
LYMPHOCYTES NFR BLD: 12 % (ref 21–52)
MAGNESIUM SERPL-MCNC: 1.8 MG/DL (ref 1.6–2.6)
MCH RBC QN AUTO: 22.8 PG (ref 24–34)
MCHC RBC AUTO-ENTMCNC: 32.3 G/DL (ref 31–37)
MCV RBC AUTO: 70.6 FL (ref 78–100)
MONOCYTES # BLD: 0.2 K/UL (ref 0.05–1.2)
MONOCYTES NFR BLD: 3 % (ref 3–10)
NEUTS SEG # BLD: 5.9 K/UL (ref 1.8–8)
NEUTS SEG NFR BLD: 85 % (ref 40–73)
NRBC # BLD: 0 K/UL (ref 0–0.01)
NRBC BLD-RTO: 0 PER 100 WBC
PLATELET # BLD AUTO: 170 K/UL (ref 135–420)
POTASSIUM SERPL-SCNC: 3.4 MMOL/L (ref 3.5–5.5)
RBC # BLD AUTO: 5.74 M/UL (ref 4.2–5.3)
SODIUM SERPL-SCNC: 138 MMOL/L (ref 136–145)
WBC # BLD AUTO: 6.9 K/UL (ref 4.6–13.2)

## 2023-07-12 PROCEDURE — 85025 COMPLETE CBC W/AUTO DIFF WBC: CPT

## 2023-07-12 PROCEDURE — 99024 POSTOP FOLLOW-UP VISIT: CPT | Performed by: ORTHOPAEDIC SURGERY

## 2023-07-12 PROCEDURE — 6360000002 HC RX W HCPCS: Performed by: ORTHOPAEDIC SURGERY

## 2023-07-12 PROCEDURE — 97161 PT EVAL LOW COMPLEX 20 MIN: CPT

## 2023-07-12 PROCEDURE — 6370000000 HC RX 637 (ALT 250 FOR IP): Performed by: PHYSICIAN ASSISTANT

## 2023-07-12 PROCEDURE — 83735 ASSAY OF MAGNESIUM: CPT

## 2023-07-12 PROCEDURE — 2580000003 HC RX 258: Performed by: PHYSICIAN ASSISTANT

## 2023-07-12 PROCEDURE — 36415 COLL VENOUS BLD VENIPUNCTURE: CPT

## 2023-07-12 PROCEDURE — 1100000000 HC RM PRIVATE

## 2023-07-12 PROCEDURE — 80048 BASIC METABOLIC PNL TOTAL CA: CPT

## 2023-07-12 PROCEDURE — 2580000003 HC RX 258: Performed by: ORTHOPAEDIC SURGERY

## 2023-07-12 PROCEDURE — 97166 OT EVAL MOD COMPLEX 45 MIN: CPT

## 2023-07-12 PROCEDURE — 6360000002 HC RX W HCPCS: Performed by: PHYSICIAN ASSISTANT

## 2023-07-12 PROCEDURE — 94761 N-INVAS EAR/PLS OXIMETRY MLT: CPT

## 2023-07-12 PROCEDURE — 97530 THERAPEUTIC ACTIVITIES: CPT

## 2023-07-12 RX ADMIN — GABAPENTIN 100 MG: 100 CAPSULE ORAL at 22:03

## 2023-07-12 RX ADMIN — SODIUM CHLORIDE, PRESERVATIVE FREE 10 ML: 5 INJECTION INTRAVENOUS at 22:11

## 2023-07-12 RX ADMIN — ATORVASTATIN CALCIUM 80 MG: 40 TABLET, FILM COATED ORAL at 22:03

## 2023-07-12 RX ADMIN — GABAPENTIN 300 MG: 100 CAPSULE ORAL at 09:36

## 2023-07-12 RX ADMIN — GABAPENTIN 100 MG: 100 CAPSULE ORAL at 14:55

## 2023-07-12 RX ADMIN — SODIUM CHLORIDE, PRESERVATIVE FREE 10 ML: 5 INJECTION INTRAVENOUS at 09:38

## 2023-07-12 RX ADMIN — ENOXAPARIN SODIUM 40 MG: 100 INJECTION SUBCUTANEOUS at 09:37

## 2023-07-12 RX ADMIN — SODIUM CHLORIDE, PRESERVATIVE FREE 10 ML: 5 INJECTION INTRAVENOUS at 22:13

## 2023-07-12 RX ADMIN — CEFAZOLIN 2000 MG: 1 INJECTION, POWDER, FOR SOLUTION INTRAMUSCULAR; INTRAVENOUS at 07:40

## 2023-07-12 RX ADMIN — SODIUM CHLORIDE, PRESERVATIVE FREE 10 ML: 5 INJECTION INTRAVENOUS at 09:39

## 2023-07-12 RX ADMIN — GABAPENTIN 300 MG: 100 CAPSULE ORAL at 22:02

## 2023-07-12 RX ADMIN — GABAPENTIN 100 MG: 100 CAPSULE ORAL at 09:37

## 2023-07-12 RX ADMIN — METOPROLOL SUCCINATE 100 MG: 100 TABLET, EXTENDED RELEASE ORAL at 09:36

## 2023-07-12 ASSESSMENT — PAIN SCALES - GENERAL: PAINLEVEL_OUTOF10: 0

## 2023-07-12 NOTE — PROGRESS NOTES
OT order received and chart reviewed. Patient currently has two active bedrest orders. Please discontinue bedrest orders for full participation in skilled OT evaluation/treatment as pt now s/p procedure.          Thank you for this referral,    Hali Pearce MS, OTR/L

## 2023-07-12 NOTE — TELEPHONE ENCOUNTER
Jacque Lemon from [facility] called for Vanessa Stubbs. Jacque Lemon said that she needs a change of Activity , so the Physical Therapist can work with he patient. That  has the patient on Bed Rest.    Jacque Lemon is asking for a call back at  [facility]. [phone]. Note : Patient is an in patient at [facility], seen by Vanessa Stubbs yesterday for Right Tibia and Fibia.

## 2023-07-12 NOTE — PROGRESS NOTES
0400 Patient resting in bed AAOx2 with periods of forgetfulness. Incontinent care given. External fixator in the right ankle dressing dry and clean,leg elevated on pillows, wiggle toes ,pedal pulses present,toes warmth to touch.

## 2023-07-12 NOTE — ANESTHESIA POSTPROCEDURE EVALUATION
Department of Anesthesiology  Postprocedure Note    Patient: Paulette Lennox  MRN: [id number]  YOB: 1960  Date of evaluation: 7/11/2023      Procedure Summary     Date: 07/11/23 Room / Location: [facility] MAIN 07 / [facility] MAIN OR    Anesthesia Start: 1821 Anesthesia Stop: 1946    Procedure: CLOSED REDUCTION RIGHT TIBIA AND FIBIA; LARGE EXTERNAL FIXATION; SYNTHES; *NEEDS KICKSTAND* (Right: Ankle) Diagnosis:       Closed fracture of right tibia and fibula, initial encounter      (Closed fracture of right tibia and fibula, initial encounter [S82.201A, S82.401A])    Surgeons: Ciro Leon MD Responsible Provider: Alan Stahl MD    Anesthesia Type: General ASA Status: 3          Anesthesia Type: General    Carolyn Phase I: Carolyn Score: 10    Carolyn Phase II:        Anesthesia Post Evaluation    Patient location during evaluation: PACU  Patient participation: complete - patient participated  Level of consciousness: sleepy but conscious  Pain score: 0  Airway patency: patent  Nausea & Vomiting: no nausea and no vomiting  Complications: no  Cardiovascular status: blood pressure returned to baseline  Respiratory status: acceptable  Hydration status: euvolemic

## 2023-07-12 NOTE — PROGRESS NOTES
ARU/IPR REFERRAL CONTACT NOTE  [address] [place] [address] Physical Rehabilitation      Thank you for the opportunity to review this patient's case for admission to [address] [place] [address] Physical Rehabilitation. Based on our pre-admission screening:     [x ] Our Team/Medical Director is following this case. Comments: Referral received from CM. Pt's spouse requested review by ARU team. Met with patient at the bedside and spoke with spouse Solomon Mai via telephone with patient's permission. Spouse requested opportunity to think over ARU requirements and will follow up in the morning. Again, Thank you for this referral. Should you have any questions please do not hesitate to call.      Sincerely,  Yolanda Blanco [facility]  [phone]

## 2023-07-12 NOTE — PROGRESS NOTES
Report received from Jose F Ann in with patient  Pt.  In working with patient  Josi Collado placed  Tolerated diet no nausea   brought in cpap, checked by bio  Bed exit on at all times  External fixator  to right lower leg.ice applied

## 2023-07-12 NOTE — CARE COORDINATION
Case Management Assessment  Initial Evaluation    Date/Time of Evaluation: 7/12/2023 11:59 AM  Assessment Completed by: Jesus Lowery    If patient is discharged prior to next notation, then this note serves as note for discharge by case management. 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 Yes    Chart Reviewed: Yes      History Provided by: Patient, Significant Other  Patient Orientation: Self, Place, Person, Situation, Other (see comment)    Patient Cognition: Other (see comment) (Alert but demonstrating some difficulty processing information at times.)    Hospitalization in the last 30 days (Readmission):  No    If yes, Readmission Assessment in CM Navigator will be completed.     Advance Directives:      Code Status: Full Code   Patient's Primary Decision Maker is: Legal Next of Kin      Discharge Planning:    Patient lives with: (P) Spouse/Significant Other Type of Home: (P) House  Primary Care Giver: Spouse  Patient Support Systems include: Spouse/Significant Other   Current Financial resources: Medicare  Current community resources: None  Current services prior to admission: (P) None            Current DME:  Hussein Counter, Electric Wheelchair            Type of Home Care services:  None    ADLS  Prior functional level: (P) Assistance with the following:, Housework, Mobility  Current functional level: (P)

## 2023-07-12 NOTE — PLAN OF CARE
Problem: Physical Therapy - Adult  Goal: By Discharge: Performs mobility at highest level of function for planned discharge setting. See evaluation for individualized goals. Description: Physical Therapy Goals:  Initiated 7/12/2023 to be met within 7-10 days. 1.  Patient will move from supine to sit and sit to supine , scoot up and down, and roll side to side in bed with minimal assistance/contact guard assist.    2.  Patient will transfer from bed to chair and chair to bed with minimal assistance/contact guard assist using the least restrictive device. 3.  Patient will perform sit to stand with moderate assistance . 4. Patient will ambulate with modA for 3 feet with the least restrictive device. PLOF: Pt reports Marta with rollator, electric RW?, lives with  in 1 story house with ramp to enter. Outcome: Progressing   PHYSICAL THERAPY EVALUATION    Patient: Rory Alvarez (7 y.o. 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 [S82.301K]  Closed fracture of proximal end of right fibula, unspecified fracture morphology, initial encounter [S82.831A]  Closed fracture of distal end of right tibia, unspecified fracture morphology, initial encounter [S82.301A]  Procedure(s) (LRB):  CLOSED REDUCTION RIGHT TIBIA AND FIBIA; LARGE EXTERNAL FIXATION; SYNTHES; *NEEDS KICKSTAND* (Right) 1 Day Post-Op   Precautions: Weight Bearing, Fall Risk, Bed Alarm, Right Lower Extremity Weight Bearing: Non Weight Bearing,  ,  ,  ,  ,  ,        ASSESSMENT :  Pt cleared to participate in PT session, pt received semi-reclined in bed and agreeable to therapy session. Based on the objective data described below, the patient presents with decreased endurance, decreased strength, decreased balance reactions, gait deviations, confusion, and decreased independence in functional mobility.  Pt rolling to each side with modA, Help From: Family  ADL Assistance: Needs assistance  Ambulation Assistance: Needs assistance  Transfer Assistance: Needs assistance  Active : No  Patient's  Info:   Critical Behavior:  Orientation  Overall Orientation Status: Impaired  Orientation Level: Oriented to person  Cognition  Overall Cognitive Status: Exceptions  Following Commands: Follows multistep commands consistently  Attention Span: Attends with cues to redirect  Insights: Decreased awareness of deficits  Initiation: Requires cues for some  Sequencing: Requires cues for some    Strength:    Strength: Generally decreased, functional    Tone & Sensation:   Tone: Normal  Sensation: Intact    Range Of Motion:  AROM: Generally decreased, functional    Functional Mobility:  Bed Mobility:     Bed Mobility Training  Bed Mobility Training: Yes  Rolling: Moderate assistance  Supine to Sit: Moderate assistance  Sit to Supine: Moderate assistance  Scooting: Contact-guard assistance  Transfers:     Transfer Training  Transfer Training: Yes  Sit to Stand: Maximum assistance  Balance:     Balance  Sitting: Impaired  Sitting - Static: Fair (occasional)  Sitting - Dynamic: Fair (occasional)  Standing: Impaired  Standing - Static: Poor    Pain:  Pain level pre-treatment: 0/10   Pain level post-treatment: 0/10   Activity Tolerance:   Activity Tolerance: Patient tolerated evaluation without incident  Please refer to the flowsheet for vital signs taken during this treatment. After treatment:   []         Patient left in no apparent distress sitting up in chair  [x]         Patient left in no apparent distress in bed  [x]         Call bell left within reach  [x]         Nursing notified  []         Caregiver present  []         Bed alarm activated  []         SCDs applied    COMMUNICATION/EDUCATION:   Patient Education  Education Given To: Patient  Education Provided: Role of Therapy;Transfer Training;Equipment;Plan of Care;Energy Conservation; Fall

## 2023-07-12 NOTE — PROGRESS NOTES
conducted an initial consultation and Spiritual Assessment for Edita Daniels, who is a 58 y.o.,female. Patient's Primary Language is: [place]. According to the patient's EMR Taoism Affiliation is: Other. 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 Explored issues of judy, belief, spirituality and Samaritan/ritual needs while hospitalized. Listened empathically. Provided information about Spiritual Care Services. Offered prayer and assurance of continued prayers on patient's behalf. Chart reviewed. The following outcomes where achieved:  Patient shared limited information about both their medical narrative and spiritual journey/beliefs.  confirmed Patient's Taoism Affiliation. Patient expressed gratitude for 's visit. Assessment:  Patient does not have any Samaritan/cultural needs that will affect patient's preferences in health care. There are no spiritual or Samaritan issues which require intervention at this time. Plan:  Chaplains will continue to follow and will provide pastoral care on an as needed/requested basis.  recommends bedside caregivers page  on duty if patient shows signs of acute spiritual or emotional distress.     [facility]   [phone]

## 2023-07-12 NOTE — CARE COORDINATION
SW was notified by PT that patient is demonstrating confusion and having difficulty bearing weight. Per patient, she suffered a TBI in 2009 and has difficulty remembering things at times. SW observed patient frequently repeating herself, but is able to answer questions and provide historical information at time of encounter. She was also alert to person, place, situation, but demonstrated some difficulty processing information at times. She advised that she does not want SNF, and prefers home health. She reports that she has had home health care in the past, due to worsening  MS symptoms, history of surgeries, and multiple falls. SW contacted patient's spouse for additional information. He endorsed worsening functional and mobility, history of multiple falls, and decline in patient's ability to process and recall information at times.  reports that if patient is unable to make safe decisions for her self, he will be assisting in treatment goal planning moving forward. He is agreeable to patient transitioning to SNF for rehab, but not LTC placement.  assists patient with ADL care in the home. He reports if she is transitioned to a facility, that will give him time to obtain necessary DME and make home alterations to accommodate handicap needs. Per , he is already working to obtain a hospital bed in the home. SW notified attending MD that patient is declining SNF and requesting home care, but PT is not recommending she discharge home.      Joie Guajardo, HARMONY   Case Management

## 2023-07-12 NOTE — PROGRESS NOTES
PROGRESS NOTE      Patient: Patti Trimble  MRN: [id number]  SSN: [id number]   YOB: 1960  Age: 58 y.o. Sex: female     Admit Date: 7/11/2023 LOS:  LOS: 1 day      POD # 1 S/P Procedure(s) with comments:  CLOSED REDUCTION RIGHT TIBIA AND FIBIA; LARGE EXTERNAL FIXATION; SYNTHES         ASSESSMENT/PLAN     Xuan Daugherty A 58 y.o. old who is currently resting at this time postop. She was having no severe pain at this current time. Her right leg had to be repositioned, she is lying lateral, I want her supine. I reemphasized to the nurses how on her foot to the position, lying supine utilizing the benefits of the kickstand to maintain position of her foot and placing rolled sheets on the inner and outer sides of her leg, to keep her leg centered. Orthopaedic:  A. Pain Meds : Narcotic analgesics  B.  DVT Prophylaxis: SCD's, Lovenox  C. IV AB: Antibiotics: Ancef for 23 hours for 23 hours perioperative dosing prophylaxis for infection. D.  Weight Bear: 100% strict nonweightbearing to this right lower extremity  E.  Physician's following patient: Consultants following patients: Orthopedics    Patti Trimble is on medicine service with Ortho consultation  F. PT/OT/ Intervention/ Consults:    PT/OT : [x]PT / [x] OT ordered // Weight Bearing Status: Nonweightbearing right lower extremity    DC disposition: Patient wants to go home, does not want to go to skilled nursing facility so she will require home health care. G.  I will for home health care orders: Remove her pin care instructions delineated carefully in the nursing section. H.  Pin care:    EXTERNAL FIXATION CARE INSTRUCTIONS     PIN CARE SITE CARE    FREQUENCY: EVERY OTHER DAY      1. Closed fracture of distal end of right tibia, unspecified fracture morphology, initial encounter    2. Syncope and collapse    3.  Closed fracture of proximal end of right fibula, unspecified fracture morphology, initial encounter

## 2023-07-13 PROBLEM — S82.301A CLOSED FRACTURE OF RIGHT DISTAL TIBIA: Status: ACTIVE | Noted: 2023-07-13

## 2023-07-13 LAB
ANION GAP SERPL CALC-SCNC: 4 MMOL/L (ref 3–18)
BASOPHILS # BLD: 0 K/UL (ref 0–0.1)
BASOPHILS NFR BLD: 0 % (ref 0–2)
BUN SERPL-MCNC: 12 MG/DL (ref 7–18)
BUN/CREAT SERPL: 17 (ref 12–20)
CALCIUM SERPL-MCNC: 9.4 MG/DL (ref 8.5–10.1)
CHLORIDE SERPL-SCNC: 108 MMOL/L (ref 100–111)
CO2 SERPL-SCNC: 31 MMOL/L (ref 21–32)
CREAT SERPL-MCNC: 0.7 MG/DL (ref 0.6–1.3)
DIFFERENTIAL METHOD BLD: ABNORMAL
EOSINOPHIL # BLD: 0.1 K/UL (ref 0–0.4)
EOSINOPHIL NFR BLD: 1 % (ref 0–5)
ERYTHROCYTE [DISTWIDTH] IN BLOOD BY AUTOMATED COUNT: 15.1 % (ref 11.6–14.5)
GLUCOSE SERPL-MCNC: 120 MG/DL (ref 74–99)
HCT VFR BLD AUTO: 37.1 % (ref 35–45)
HGB BLD-MCNC: 11.6 G/DL (ref 12–16)
IMM GRANULOCYTES # BLD AUTO: 0 K/UL (ref 0–0.04)
IMM GRANULOCYTES NFR BLD AUTO: 0 % (ref 0–0.5)
LYMPHOCYTES # BLD: 2.2 K/UL (ref 0.9–3.6)
LYMPHOCYTES NFR BLD: 23 % (ref 21–52)
MAGNESIUM SERPL-MCNC: 2.1 MG/DL (ref 1.6–2.6)
MCH RBC QN AUTO: 22.3 PG (ref 24–34)
MCHC RBC AUTO-ENTMCNC: 31.3 G/DL (ref 31–37)
MCV RBC AUTO: 71.3 FL (ref 78–100)
MONOCYTES # BLD: 0.8 K/UL (ref 0.05–1.2)
MONOCYTES NFR BLD: 8 % (ref 3–10)
NEUTS SEG # BLD: 6.5 K/UL (ref 1.8–8)
NEUTS SEG NFR BLD: 68 % (ref 40–73)
NRBC # BLD: 0 K/UL (ref 0–0.01)
NRBC BLD-RTO: 0 PER 100 WBC
PLATELET # BLD AUTO: 204 K/UL (ref 135–420)
PMV BLD AUTO: 11.6 FL (ref 9.2–11.8)
POTASSIUM SERPL-SCNC: 3.2 MMOL/L (ref 3.5–5.5)
RBC # BLD AUTO: 5.2 M/UL (ref 4.2–5.3)
SODIUM SERPL-SCNC: 143 MMOL/L (ref 136–145)
WBC # BLD AUTO: 9.6 K/UL (ref 4.6–13.2)

## 2023-07-13 PROCEDURE — 99024 POSTOP FOLLOW-UP VISIT: CPT | Performed by: ORTHOPAEDIC SURGERY

## 2023-07-13 PROCEDURE — 36415 COLL VENOUS BLD VENIPUNCTURE: CPT

## 2023-07-13 PROCEDURE — 6370000000 HC RX 637 (ALT 250 FOR IP): Performed by: PHYSICIAN ASSISTANT

## 2023-07-13 PROCEDURE — 6360000002 HC RX W HCPCS: Performed by: PHYSICIAN ASSISTANT

## 2023-07-13 PROCEDURE — 83735 ASSAY OF MAGNESIUM: CPT

## 2023-07-13 PROCEDURE — 1100000000 HC RM PRIVATE

## 2023-07-13 PROCEDURE — 97535 SELF CARE MNGMENT TRAINING: CPT

## 2023-07-13 PROCEDURE — 6370000000 HC RX 637 (ALT 250 FOR IP): Performed by: HOSPITALIST

## 2023-07-13 PROCEDURE — 85025 COMPLETE CBC W/AUTO DIFF WBC: CPT

## 2023-07-13 PROCEDURE — 2580000003 HC RX 258: Performed by: PHYSICIAN ASSISTANT

## 2023-07-13 PROCEDURE — 97110 THERAPEUTIC EXERCISES: CPT

## 2023-07-13 PROCEDURE — 97530 THERAPEUTIC ACTIVITIES: CPT

## 2023-07-13 PROCEDURE — 94761 N-INVAS EAR/PLS OXIMETRY MLT: CPT

## 2023-07-13 PROCEDURE — 99232 SBSQ HOSP IP/OBS MODERATE 35: CPT | Performed by: HOSPITALIST

## 2023-07-13 PROCEDURE — 80048 BASIC METABOLIC PNL TOTAL CA: CPT

## 2023-07-13 RX ORDER — POTASSIUM CHLORIDE 20 MEQ/1
40 TABLET, EXTENDED RELEASE ORAL 2 TIMES DAILY WITH MEALS
Status: COMPLETED | OUTPATIENT
Start: 2023-07-13 | End: 2023-07-14

## 2023-07-13 RX ADMIN — SODIUM CHLORIDE, PRESERVATIVE FREE 10 ML: 5 INJECTION INTRAVENOUS at 12:11

## 2023-07-13 RX ADMIN — POTASSIUM CHLORIDE 40 MEQ: 1500 TABLET, EXTENDED RELEASE ORAL at 09:37

## 2023-07-13 RX ADMIN — ENOXAPARIN SODIUM 40 MG: 100 INJECTION SUBCUTANEOUS at 09:38

## 2023-07-13 RX ADMIN — GABAPENTIN 100 MG: 100 CAPSULE ORAL at 09:29

## 2023-07-13 RX ADMIN — ATORVASTATIN CALCIUM 80 MG: 40 TABLET, FILM COATED ORAL at 20:56

## 2023-07-13 RX ADMIN — GABAPENTIN 100 MG: 100 CAPSULE ORAL at 14:39

## 2023-07-13 RX ADMIN — METOPROLOL SUCCINATE 100 MG: 100 TABLET, EXTENDED RELEASE ORAL at 09:30

## 2023-07-13 RX ADMIN — SODIUM CHLORIDE, PRESERVATIVE FREE 10 ML: 5 INJECTION INTRAVENOUS at 20:57

## 2023-07-13 RX ADMIN — POTASSIUM CHLORIDE 40 MEQ: 1500 TABLET, EXTENDED RELEASE ORAL at 16:37

## 2023-07-13 RX ADMIN — GABAPENTIN 100 MG: 100 CAPSULE ORAL at 20:56

## 2023-07-13 RX ADMIN — GABAPENTIN 300 MG: 100 CAPSULE ORAL at 20:56

## 2023-07-13 RX ADMIN — GABAPENTIN 300 MG: 100 CAPSULE ORAL at 09:29

## 2023-07-13 ASSESSMENT — PAIN SCALES - GENERAL
PAINLEVEL_OUTOF10: 0
PAINLEVEL_OUTOF10: 0

## 2023-07-13 NOTE — PROGRESS NOTES
ARU/IPR REFERRAL CONTACT NOTE  [address] [place] [address] Physical Rehabilitation      Thank you for the opportunity to review this patient's case for admission to [address] [place] [address] Physical Rehabilitation. Based on our pre-admission screening:     [x ] Our Team/Medical Director is following this case. Comments: Met with patient's spouse on the unit for a tour. Discussed ARU and discharge plan post rehab with spouse. Spouse agreed to submit for insurance authorization. Again, Thank you for this referral. Should you have any questions please do not hesitate to call.      Sincerely,  [facility]  [phone]

## 2023-07-13 NOTE — PROGRESS NOTES
PROGRESS NOTE      Patient: Taiwo James  MRN: [id number]  SSN: [id number]   YOB: 1960  Age: 58 y.o. Sex: female     Admit Date: 7/11/2023 LOS:  LOS: 2 days      POD # 1 S/P Procedure(s) with comments:  CLOSED REDUCTION RIGHT TIBIA AND FIBIA; LARGE EXTERNAL FIXATION; SYNTHES         ASSESSMENT/PLAN     Negar Guerin A 58 y.o. old who is currently resting at this time postop. Her daughter is in the room with her at this time. I reemphasized to the nurses how on her foot to the position, lying supine utilizing the benefits of the kickstand to maintain position of her foot and placing rolled sheets on the inner and outer sides of her leg, to keep her leg centered. Plan for staged ORIF for 7/27/2023 Thursday. She needs ice pack at fx site     Orthopaedic:  A. Pain Meds : Narcotic analgesics  B.  DVT Prophylaxis: SCD's, Lovenox  C. IV AB: Antibiotics: Ancef for 23 hours for 23 hours perioperative dosing prophylaxis for infection. D.  Weight Bear: 100% strict nonweightbearing to this right lower extremity  E.  Physician's following patient: Consultants following patients: Orthopedics    Taiwo James is on medicine service with Ortho consultation  F. PT/OT/ Intervention/ Consults:    PT/OT : [x]PT / [x] OT ordered // Weight Bearing Status: Nonweightbearing right lower extremity    DC disposition: TBD  G. I will for home health care orders: Remove her pin care instructions delineated carefully in the nursing section. H.  Pin care:    EXTERNAL FIXATION CARE INSTRUCTIONS     PIN CARE SITE CARE    FREQUENCY: EVERY OTHER DAY      1. Closed fracture of distal end of right tibia, unspecified fracture morphology, initial encounter    2. Syncope and collapse    3. Closed fracture of proximal end of right fibula, unspecified fracture morphology, initial encounter      FRACTURES    1. Please wash your hands  2. Place sterile gloves on  3.   Clean pin sites ( all skin pin

## 2023-07-13 NOTE — PLAN OF CARE
Problem: Physical Therapy - Adult  Goal: By Discharge: Performs mobility at highest level of function for planned discharge setting. See evaluation for individualized goals. Description: Physical Therapy Goals:  Initiated 7/12/2023 to be met within 7-10 days. 1.  Patient will move from supine to sit and sit to supine , scoot up and down, and roll side to side in bed with minimal assistance/contact guard assist.    2.  Patient will transfer from bed to chair and chair to bed with minimal assistance/contact guard assist using the least restrictive device. 3.  Patient will perform sit to stand with moderate assistance . 4. Patient will ambulate with modA for 3 feet with the least restrictive device. PLOF: Pt reports Marta with rollator, electric RW?, lives with  in 1 story house with ramp to enter. Outcome: Progressing   PHYSICAL THERAPY TREATMENT    Patient: Karlie Madera (11 y.o. 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 Completing with OT to maximize safety and mobility. Pt continues to void urine throughout session, poor control of bladder. Pt with no LE pain.  CGA for bed mobility, sitting on EOB with good balance, working on LAQ of RLE x15 reps with full knee range of motion. Standing x2 reps, 1 person assisting RLE to keep off floor and mod-maxA to stand. Pt able to de weight bottom but L knee flexion in standing. Able to stand for up to 5 seconds, unable to transfers. Pt found to be soiled of BM also. Returned to supine with Laura, rolling with CGA-Laura. Asssisted with pericare. Pt positioned for comfort and educated to call for assist before getting up, pt verbalized understanding. Pt left with all needs met and call bell in reach. RN notified of position and participation. Bed alarm activated. Progression toward goals:   []      Improving appropriately and progressing toward goals  [x]      Improving slowly and progressing toward goals  []      Not making progress toward goals and plan of care will be adjusted     PLAN:  Patient continues to benefit from skilled intervention to address the above impairments. Continue treatment per established plan of care. Further Equipment Recommendations for Discharge:  wheelchair, slideboard    AMPAC:          Current research shows that an AM-PAC score of 17 (13 without stairs) or less is not associated with a discharge to the patient's home setting. Based on an AM-PAC score and their current functional mobility deficits, it is recommended that the patient have 3-5 sessions per week of Physical Therapy at d/c to increase the patient's independence. This AMPAC score should be considered in conjunction with interdisciplinary team recommendations to determine the most appropriate discharge setting. Patient's social support, diagnosis, medical stability, and prior level of function should also be taken into consideration. SUBJECTIVE:   Patient stated, \"I keep peeing. \"    OBJECTIVE DATA SUMMARY:   Critical Behavior:  Orientation  Overall Orientation Status: Impaired  Orientation Level: Oriented to person    Functional Mobility Training:  Bed Mobility:  Bed Mobility

## 2023-07-13 NOTE — PROGRESS NOTES
Report given to [address] patients vitals are stable and ready for transport to [address] [place] [address]

## 2023-07-13 NOTE — PLAN OF CARE
Problem: Occupational Therapy - Adult  Goal: By Discharge: Performs self-care activities at highest level of function for planned discharge setting. See evaluation for individualized goals. Description: Occupational Therapy Goals:  Initiated 7/12/2023 to be met within 7-10 days. 1.  Patient will perform bed mobility in preparation for ADLs with supervision/set-up. 2.  Patient will perform upper body dressing with modified independence. 3.  Patient will perform functional activity/grooming task sitting EOB with supervision/set-up, G balance. 4.  Patient will perform toilet transfers with minimal assistance/contact guard assist maintaining NWB. 5.  Patient will perform all aspects of toileting with minimal assistance/contact guard assist.  6.  Patient will participate in upper extremity therapeutic exercise/activities with supervision/set-up for 8-10 minutes to increase ROM/strength for ADLs. 7.  Patient will utilize energy conservation techniques during functional activities with verbal cues. PLOF: mPatient a poor historian. Per chart review, pt received assist for ADLs.    Outcome: Progressing   OCCUPATIONAL THERAPY TREATMENT    Patient: Amna Guy (82 y.o. 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

## 2023-07-13 NOTE — PROGRESS NOTES
Pt arrived to unit in  bed accompanied by transport. Pt confused at this time, oriented to self only. Pulling at lines, IV's, removing tele box. Reoriented pt. Elevated rt leg on pillows. Tele monitor attached to patient. NSR is baseline. VSS. Oriented patient to room and plan for the night. Pt remains confused and forgetful at times. SR up x3. Call light within reach, bed in lowest position. Bed alarm iand frequent checks in place. Plan of care continues. 0530 - Pt confused throughout the shift. Pt still pulling at lines, at times. Reorienting as needed.

## 2023-07-14 LAB
ANION GAP SERPL CALC-SCNC: 2 MMOL/L (ref 3–18)
BASOPHILS # BLD: 0 K/UL (ref 0–0.1)
BASOPHILS NFR BLD: 1 % (ref 0–2)
BUN SERPL-MCNC: 14 MG/DL (ref 7–18)
BUN/CREAT SERPL: 22 (ref 12–20)
CALCIUM SERPL-MCNC: 8.9 MG/DL (ref 8.5–10.1)
CHLORIDE SERPL-SCNC: 113 MMOL/L (ref 100–111)
CO2 SERPL-SCNC: 30 MMOL/L (ref 21–32)
CREAT SERPL-MCNC: 0.65 MG/DL (ref 0.6–1.3)
DIFFERENTIAL METHOD BLD: ABNORMAL
EOSINOPHIL # BLD: 0.2 K/UL (ref 0–0.4)
EOSINOPHIL NFR BLD: 2 % (ref 0–5)
ERYTHROCYTE [DISTWIDTH] IN BLOOD BY AUTOMATED COUNT: 15.2 % (ref 11.6–14.5)
GLUCOSE SERPL-MCNC: 118 MG/DL (ref 74–99)
HCT VFR BLD AUTO: 34.1 % (ref 35–45)
HGB BLD-MCNC: 10.7 G/DL (ref 12–16)
IMM GRANULOCYTES # BLD AUTO: 0 K/UL (ref 0–0.04)
IMM GRANULOCYTES NFR BLD AUTO: 0 % (ref 0–0.5)
LYMPHOCYTES # BLD: 2.6 K/UL (ref 0.9–3.6)
LYMPHOCYTES NFR BLD: 33 % (ref 21–52)
MCH RBC QN AUTO: 22.4 PG (ref 24–34)
MCHC RBC AUTO-ENTMCNC: 31.4 G/DL (ref 31–37)
MCV RBC AUTO: 71.5 FL (ref 78–100)
MONOCYTES # BLD: 0.5 K/UL (ref 0.05–1.2)
MONOCYTES NFR BLD: 7 % (ref 3–10)
NEUTS SEG # BLD: 4.5 K/UL (ref 1.8–8)
NEUTS SEG NFR BLD: 57 % (ref 40–73)
NRBC # BLD: 0 K/UL (ref 0–0.01)
NRBC BLD-RTO: 0 PER 100 WBC
PLATELET # BLD AUTO: 191 K/UL (ref 135–420)
PMV BLD AUTO: 11.6 FL (ref 9.2–11.8)
POTASSIUM SERPL-SCNC: 3.8 MMOL/L (ref 3.5–5.5)
RBC # BLD AUTO: 4.77 M/UL (ref 4.2–5.3)
SODIUM SERPL-SCNC: 145 MMOL/L (ref 136–145)
WBC # BLD AUTO: 7.9 K/UL (ref 4.6–13.2)

## 2023-07-14 PROCEDURE — 97530 THERAPEUTIC ACTIVITIES: CPT

## 2023-07-14 PROCEDURE — 94761 N-INVAS EAR/PLS OXIMETRY MLT: CPT

## 2023-07-14 PROCEDURE — 85025 COMPLETE CBC W/AUTO DIFF WBC: CPT

## 2023-07-14 PROCEDURE — 36415 COLL VENOUS BLD VENIPUNCTURE: CPT

## 2023-07-14 PROCEDURE — 1100000000 HC RM PRIVATE

## 2023-07-14 PROCEDURE — 6370000000 HC RX 637 (ALT 250 FOR IP): Performed by: HOSPITALIST

## 2023-07-14 PROCEDURE — 99232 SBSQ HOSP IP/OBS MODERATE 35: CPT | Performed by: HOSPITALIST

## 2023-07-14 PROCEDURE — 2580000003 HC RX 258: Performed by: PHYSICIAN ASSISTANT

## 2023-07-14 PROCEDURE — 97535 SELF CARE MNGMENT TRAINING: CPT

## 2023-07-14 PROCEDURE — 6370000000 HC RX 637 (ALT 250 FOR IP): Performed by: PHYSICIAN ASSISTANT

## 2023-07-14 PROCEDURE — 6360000002 HC RX W HCPCS: Performed by: PHYSICIAN ASSISTANT

## 2023-07-14 PROCEDURE — 80048 BASIC METABOLIC PNL TOTAL CA: CPT

## 2023-07-14 PROCEDURE — 2580000003 HC RX 258: Performed by: ORTHOPAEDIC SURGERY

## 2023-07-14 PROCEDURE — 99024 POSTOP FOLLOW-UP VISIT: CPT | Performed by: ORTHOPAEDIC SURGERY

## 2023-07-14 RX ADMIN — POTASSIUM CHLORIDE 40 MEQ: 1500 TABLET, EXTENDED RELEASE ORAL at 08:38

## 2023-07-14 RX ADMIN — GABAPENTIN 100 MG: 100 CAPSULE ORAL at 08:37

## 2023-07-14 RX ADMIN — POTASSIUM CHLORIDE 40 MEQ: 1500 TABLET, EXTENDED RELEASE ORAL at 15:59

## 2023-07-14 RX ADMIN — SODIUM CHLORIDE, PRESERVATIVE FREE 10 ML: 5 INJECTION INTRAVENOUS at 22:31

## 2023-07-14 RX ADMIN — SODIUM CHLORIDE, PRESERVATIVE FREE 10 ML: 5 INJECTION INTRAVENOUS at 08:44

## 2023-07-14 RX ADMIN — GABAPENTIN 100 MG: 100 CAPSULE ORAL at 13:48

## 2023-07-14 RX ADMIN — GABAPENTIN 300 MG: 100 CAPSULE ORAL at 22:32

## 2023-07-14 RX ADMIN — GABAPENTIN 300 MG: 100 CAPSULE ORAL at 08:37

## 2023-07-14 RX ADMIN — ENOXAPARIN SODIUM 40 MG: 100 INJECTION SUBCUTANEOUS at 08:44

## 2023-07-14 RX ADMIN — GABAPENTIN 100 MG: 100 CAPSULE ORAL at 22:32

## 2023-07-14 RX ADMIN — MORPHINE SULFATE 2 MG: 2 INJECTION, SOLUTION INTRAMUSCULAR; INTRAVENOUS at 22:39

## 2023-07-14 RX ADMIN — METOPROLOL SUCCINATE 100 MG: 100 TABLET, EXTENDED RELEASE ORAL at 08:35

## 2023-07-14 RX ADMIN — SODIUM CHLORIDE, PRESERVATIVE FREE 10 ML: 5 INJECTION INTRAVENOUS at 08:43

## 2023-07-14 RX ADMIN — ATORVASTATIN CALCIUM 80 MG: 40 TABLET, FILM COATED ORAL at 22:31

## 2023-07-14 ASSESSMENT — PAIN DESCRIPTION - DESCRIPTORS: DESCRIPTORS: THROBBING

## 2023-07-14 ASSESSMENT — PAIN SCALES - GENERAL
PAINLEVEL_OUTOF10: 0
PAINLEVEL_OUTOF10: 7
PAINLEVEL_OUTOF10: 0

## 2023-07-14 ASSESSMENT — PAIN DESCRIPTION - LOCATION: LOCATION: LEG

## 2023-07-14 ASSESSMENT — PAIN DESCRIPTION - ORIENTATION: ORIENTATION: RIGHT

## 2023-07-14 NOTE — PROGRESS NOTES
voice recognition software. Minor typographical errors may be present. If questions arise, please do not hesitate to contact me or call our department.

## 2023-07-14 NOTE — PROGRESS NOTES
PROGRESS NOTE      Patient: Rory Alvarez  MRN: [id number]  SSN: [id number]   YOB: 1960  Age: 58 y.o. Sex: female     Admit Date: 7/11/2023 LOS:  LOS: 3 days      POD # 1 S/P Procedure(s) with comments:  CLOSED REDUCTION RIGHT TIBIA AND FIBIA; LARGE EXTERNAL FIXATION; SYNTHES         ASSESSMENT/PLAN     Colette Madera A 58 y.o. old who is currently resting at this time postop. She had to be re reminded that she will need staged ORIF of her right distal tibia/fibula fracture. I reviewed xrays with her yesterday with her daughter. She have TBI, forgetfulness. Plan for staged ORIF for 7/27/2023 Thursday. Continue ice right anterior distal tibia    Orthopaedic:  A. Pain Meds : Narcotic analgesics  B.  DVT Prophylaxis: SCD's, Lovenox  C. IV AB: Antibiotics: Ancef for 23 hours for 23 hours perioperative dosing prophylaxis for infection. D.  Weight Bear: 100% strict nonweightbearing to this right lower extremity  E.  Physician's following patient: Consultants following patients: Orthopedics    Rory Alvarez is on medicine service with Ortho consultation  F. PT/OT/ Intervention/ Consults:    PT/OT : [x]PT / [x] OT ordered // Weight Bearing Status: Nonweightbearing right lower extremity    DC disposition: TBD  G. I will for home health care orders: Remove her pin care instructions delineated carefully in the nursing section. H.  Pin care:      EXTERNAL FIXATION CARE INSTRUCTIONS     PIN CARE SITE CARE    FREQUENCY: EVERY OTHER DAY      1. Closed fracture of distal end of right tibia, unspecified fracture morphology, initial encounter    2. Syncope and collapse    3. Closed fracture of proximal end of right fibula, unspecified fracture morphology, initial encounter      FRACTURES    1. Please wash your hands  2. Place sterile gloves on  3. Clean pin sites ( all skin pin sites):    4. Remove dressings  5.   Remove Biopatch pads, clean pin sites with sterile saline TO

## 2023-07-14 NOTE — PLAN OF CARE
Problem: Physical Therapy - Adult  Goal: By Discharge: Performs mobility at highest level of function for planned discharge setting. See evaluation for individualized goals. Description: Physical Therapy Goals:  Initiated 7/12/2023 to be met within 7-10 days. 1.  Patient will move from supine to sit and sit to supine , scoot up and down, and roll side to side in bed with minimal assistance/contact guard assist.    2.  Patient will transfer from bed to chair and chair to bed with minimal assistance/contact guard assist using the least restrictive device. 3.  Patient will perform sit to stand with moderate assistance . 4. Patient will ambulate with modA for 3 feet with the least restrictive device. PLOF: Pt reports Marta with rollator, electric RW?, lives with  in 1 story house with ramp to enter. Outcome: Progressing     PHYSICAL THERAPY TREATMENT    Patient: Yan Thomas (31 y.o. 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 Pt needs frequent orientation to situation/task as well as repetition of commands. Pt continues to have a high amount of difficulty keeping R leg off ground sitting EOB and with standing trials despite cues.  PT assistance  Balance:  Balance  Sitting: Intact  Sitting - Static: Good (unsupported)  Sitting - Dynamic: Good (unsupported)  Standing: Impaired  Standing - Static: Poor    Pain:  Pain level pre-treatment: 0/10  Pain level post-treatment: 0/10   Pain Intervention(s): Rest, Ice, Repositioning   Response to intervention: Nurse notified    Activity Tolerance:   Activity Tolerance: Treatment limited secondary to decreased cognition  Please refer to the flowsheet for vital signs taken during this treatment. After treatment:   [] Patient left in no apparent distress sitting up in chair  [x] Patient left in no apparent distress in bed  [x] Call bell left within reach  [x] Nursing notified  [x] Caregiver present  [x] Bed alarm activated  [] SCDs applied      COMMUNICATION/EDUCATION:   Patient Education  Education Given To: Patient; Family  Education Provided: Role of Therapy;Transfer Training;Equipment;Plan of Care;Energy Conservation; Fall Prevention Strategies  Education Method: Teach Back  Barriers to Learning: Cognition  Education Outcome: Continued education needed      Lilo Navarro, PT  Minutes: 26

## 2023-07-14 NOTE — PLAN OF CARE
Problem: Occupational Therapy - Adult  Goal: By Discharge: Performs self-care activities at highest level of function for planned discharge setting. See evaluation for individualized goals. Description: Occupational Therapy Goals:  Initiated 7/12/2023 to be met within 7-10 days. 1.  Patient will perform bed mobility in preparation for ADLs with supervision/set-up. 2.  Patient will perform upper body dressing with modified independence. 3.  Patient will perform functional activity/grooming task sitting EOB with supervision/set-up, G balance. 4.  Patient will perform toilet transfers with minimal assistance/contact guard assist maintaining NWB. 5.  Patient will perform all aspects of toileting with minimal assistance/contact guard assist.  6.  Patient will participate in upper extremity therapeutic exercise/activities with supervision/set-up for 8-10 minutes to increase ROM/strength for ADLs. 7.  Patient will utilize energy conservation techniques during functional activities with verbal cues. PLOF: mPatient a poor historian. Per chart review, pt received assist for ADLs.    Outcome: Progressing   OCCUPATIONAL THERAPY TREATMENT    Patient: Dannielle Rush (27 y.o. 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

## 2023-07-15 LAB
BASOPHILS # BLD: 0.1 K/UL (ref 0–0.1)
BASOPHILS NFR BLD: 1 % (ref 0–2)
DIFFERENTIAL METHOD BLD: ABNORMAL
EOSINOPHIL # BLD: 0.2 K/UL (ref 0–0.4)
EOSINOPHIL NFR BLD: 3 % (ref 0–5)
ERYTHROCYTE [DISTWIDTH] IN BLOOD BY AUTOMATED COUNT: 16 % (ref 11.6–14.5)
HCT VFR BLD AUTO: 38.7 % (ref 35–45)
HGB BLD-MCNC: 12 G/DL (ref 12–16)
IMM GRANULOCYTES # BLD AUTO: 0.1 K/UL (ref 0–0.04)
IMM GRANULOCYTES NFR BLD AUTO: 1 % (ref 0–0.5)
LYMPHOCYTES # BLD: 3 K/UL (ref 0.9–3.6)
LYMPHOCYTES NFR BLD: 38 % (ref 21–52)
MCH RBC QN AUTO: 22.6 PG (ref 24–34)
MCHC RBC AUTO-ENTMCNC: 31 G/DL (ref 31–37)
MCV RBC AUTO: 72.9 FL (ref 78–100)
MONOCYTES # BLD: 0.5 K/UL (ref 0.05–1.2)
MONOCYTES NFR BLD: 7 % (ref 3–10)
NEUTS SEG # BLD: 4.1 K/UL (ref 1.8–8)
NEUTS SEG NFR BLD: 51 % (ref 40–73)
NRBC # BLD: 0 K/UL (ref 0–0.01)
NRBC BLD-RTO: 0 PER 100 WBC
PLATELET # BLD AUTO: 149 K/UL (ref 135–420)
RBC # BLD AUTO: 5.31 M/UL (ref 4.2–5.3)
WBC # BLD AUTO: 8 K/UL (ref 4.6–13.2)

## 2023-07-15 PROCEDURE — 6360000002 HC RX W HCPCS: Performed by: PHYSICIAN ASSISTANT

## 2023-07-15 PROCEDURE — 2580000003 HC RX 258: Performed by: PHYSICIAN ASSISTANT

## 2023-07-15 PROCEDURE — 85025 COMPLETE CBC W/AUTO DIFF WBC: CPT

## 2023-07-15 PROCEDURE — 1100000000 HC RM PRIVATE

## 2023-07-15 PROCEDURE — 99232 SBSQ HOSP IP/OBS MODERATE 35: CPT | Performed by: HOSPITALIST

## 2023-07-15 PROCEDURE — 6370000000 HC RX 637 (ALT 250 FOR IP): Performed by: PHYSICIAN ASSISTANT

## 2023-07-15 PROCEDURE — 2580000003 HC RX 258: Performed by: ORTHOPAEDIC SURGERY

## 2023-07-15 PROCEDURE — 36415 COLL VENOUS BLD VENIPUNCTURE: CPT

## 2023-07-15 RX ADMIN — METOPROLOL SUCCINATE 100 MG: 100 TABLET, EXTENDED RELEASE ORAL at 09:43

## 2023-07-15 RX ADMIN — GABAPENTIN 300 MG: 100 CAPSULE ORAL at 09:44

## 2023-07-15 RX ADMIN — GABAPENTIN 300 MG: 100 CAPSULE ORAL at 21:41

## 2023-07-15 RX ADMIN — GABAPENTIN 100 MG: 100 CAPSULE ORAL at 09:44

## 2023-07-15 RX ADMIN — GABAPENTIN 100 MG: 100 CAPSULE ORAL at 21:42

## 2023-07-15 RX ADMIN — ENOXAPARIN SODIUM 40 MG: 100 INJECTION SUBCUTANEOUS at 09:42

## 2023-07-15 RX ADMIN — ATORVASTATIN CALCIUM 80 MG: 40 TABLET, FILM COATED ORAL at 21:41

## 2023-07-15 RX ADMIN — SODIUM CHLORIDE, PRESERVATIVE FREE 10 ML: 5 INJECTION INTRAVENOUS at 22:01

## 2023-07-15 RX ADMIN — SODIUM CHLORIDE, PRESERVATIVE FREE 10 ML: 5 INJECTION INTRAVENOUS at 09:45

## 2023-07-15 RX ADMIN — SODIUM CHLORIDE, PRESERVATIVE FREE 10 ML: 5 INJECTION INTRAVENOUS at 22:02

## 2023-07-15 NOTE — PROGRESS NOTES
PROGRESS NOTE      Patient: Israel Curling  MRN: [id number]  SSN: [id number]   YOB: 1960  Age: 58 y.o. Sex: female     Admit Date: 7/11/2023 LOS:  LOS: 4 days      POD # 2 S/P Procedure(s) with comments:  CLOSED REDUCTION RIGHT TIBIA AND FIBIA; LARGE EXTERNAL FIXATION; SYNTHES         ASSESSMENT/PLAN     Nesha Bradley A 58 y.o. old who is confortable and relatively alert and oriented. She had to be re reminded that she will need staged ORIF of her right distal tibia/fibula fracture. Dr Dragan Hill reviewed xrays with her Thursday with her daughter. She has TBI, forgetfulness. Plan for staged ORIF for 7/27/2023 Thursday. Continue ice right anterior distal tibia    Orthopaedic:  A. Pain Meds : Narcotic analgesics  B.  DVT Prophylaxis: SCD's, Lovenox  C. IV AB: Antibiotics: Ancef for 23 hours for 23 hours perioperative dosing prophylaxis for infection. D.  Weight Bear: 100% strict nonweightbearing to this right lower extremity  E.  Physician's following patient: Consultants following patients: Orthopedics    Israel Curling is on medicine service with Ortho consultation  F. PT/OT/ Intervention/ Consults:    PT/OT : [x]PT / [x] OT ordered // Weight Bearing Status: Nonweightbearing right lower extremity    DC disposition: TBD  G. Home health care orders: Remove her pin care instructions delineated carefully in the nursing section. H.  Pin care:      EXTERNAL FIXATION CARE INSTRUCTIONS     PIN CARE SITE CARE    FREQUENCY: EVERY OTHER DAY      1. Closed fracture of distal end of right tibia, unspecified fracture morphology, initial encounter    2. Syncope and collapse    3. Closed fracture of proximal end of right fibula, unspecified fracture morphology, initial encounter        FRACTURES    1. Please wash your hands  2. Place sterile gloves on  3. Clean pin sites ( all skin pin sites):    4. Remove dressings  5.   Remove Biopatch pads, clean pin sites with sterile saline TOPICALLY TO AFFECTED AREA(S) THREE TIMES A DAY 1/29/21   Ar Automatic Reconciliation   gabapentin (NEURONTIN) 100 MG capsule Take 1 capsule by mouth 3 times daily. Ar Automatic Reconciliation   oxyCODONE (ROXICODONE) 5 MG immediate release tablet Take 10 mg by mouth every 4 hours as needed.  9/4/15   Ar Automatic Reconciliation     Current Facility-Administered Medications   Medication Dose Route Frequency    sodium chloride flush 0.9 % injection 5-40 mL  5-40 mL IntraVENous 2 times per day    sodium chloride flush 0.9 % injection 5-40 mL  5-40 mL IntraVENous PRN    enoxaparin (LOVENOX) injection 40 mg  40 mg SubCUTAneous Daily    polyethylene glycol (GLYCOLAX) packet 17 g  17 g Oral Daily PRN    bisacodyl (DULCOLAX) suppository 10 mg  10 mg Rectal Daily PRN    acetaminophen (TYLENOL) tablet 650 mg  650 mg Oral Q6H PRN    Or    acetaminophen (TYLENOL) suppository 650 mg  650 mg Rectal Q6H PRN    morphine (PF) injection 2 mg  2 mg IntraVENous Q4H PRN    atorvastatin (LIPITOR) tablet 80 mg  80 mg Oral Nightly    gabapentin (NEURONTIN) capsule 100 mg  100 mg Oral TID    gabapentin (NEURONTIN) capsule 300 mg  300 mg Oral BID    metoprolol succinate (TOPROL XL) extended release tablet 100 mg  100 mg Oral Daily    Teriflunomide TABS 14 mg (Patient Supplied)  14 mg Oral Daily    sodium chloride flush 0.9 % injection 5-40 mL  5-40 mL IntraVENous 2 times per day    sodium chloride flush 0.9 % injection 5-40 mL  5-40 mL IntraVENous PRN    ondansetron (ZOFRAN-ODT) disintegrating tablet 4 mg  4 mg Oral Q8H PRN    Or    ondansetron (ZOFRAN) injection 4 mg  4 mg IntraVENous Q6H PRN     No Known Allergies            Mikhail Vance DO  7/15/2023  10:22 AM

## 2023-07-15 NOTE — PROGRESS NOTES
Received report from [facility]. Pt AAOx3, NAD, breathing non labored, on room air, HOB up. IV site clean, dry and intact. External fixator RLL in place. Bed at the lowest level on lock position, call bell w/i reach. Bed alarm on.

## 2023-07-15 NOTE — PLAN OF CARE
Problem: Discharge Planning  Goal: Discharge to home or other facility with appropriate resources  7/15/2023 0424 by Noa Linn RN  Outcome: Progressing  7/15/2023 0423 by Noa Linn RN  Outcome: Progressing     Problem: Safety - Adult  Goal: Free from fall injury  7/15/2023 0424 by Noa Linn RN  Outcome: Progressing  7/15/2023 0423 by Noa Linn RN  Outcome: Progressing     Problem: ABCDS Injury Assessment  Goal: Absence of physical injury  7/15/2023 0424 by Noa Linn RN  Outcome: Progressing  7/15/2023 0423 by Noa Linn RN  Outcome: Progressing     Problem: Skin/Tissue Integrity  Goal: Absence of new skin breakdown  Description: 1. Monitor for areas of redness and/or skin breakdown  2. Assess vascular access sites hourly  3. Every 4-6 hours minimum:  Change oxygen saturation probe site  4. Every 4-6 hours:  If on nasal continuous positive airway pressure, respiratory therapy assess nares and determine need for appliance change or resting period.   7/15/2023 0424 by Noa Linn RN  Outcome: Progressing  7/15/2023 0423 by Noa Linn RN  Outcome: Progressing

## 2023-07-16 LAB
BASOPHILS # BLD: 0.1 K/UL (ref 0–0.1)
BASOPHILS NFR BLD: 1 % (ref 0–2)
DIFFERENTIAL METHOD BLD: ABNORMAL
EOSINOPHIL # BLD: 0.2 K/UL (ref 0–0.4)
EOSINOPHIL NFR BLD: 2 % (ref 0–5)
ERYTHROCYTE [DISTWIDTH] IN BLOOD BY AUTOMATED COUNT: 15.6 % (ref 11.6–14.5)
HCT VFR BLD AUTO: 39.7 % (ref 35–45)
HGB BLD-MCNC: 12.4 G/DL (ref 12–16)
IMM GRANULOCYTES # BLD AUTO: 0 K/UL (ref 0–0.04)
IMM GRANULOCYTES NFR BLD AUTO: 0 % (ref 0–0.5)
LYMPHOCYTES # BLD: 2.6 K/UL (ref 0.9–3.6)
LYMPHOCYTES NFR BLD: 27 % (ref 21–52)
MCH RBC QN AUTO: 22.1 PG (ref 24–34)
MCHC RBC AUTO-ENTMCNC: 31.2 G/DL (ref 31–37)
MCV RBC AUTO: 70.8 FL (ref 78–100)
MONOCYTES # BLD: 0.7 K/UL (ref 0.05–1.2)
MONOCYTES NFR BLD: 7 % (ref 3–10)
NEUTS SEG # BLD: 6 K/UL (ref 1.8–8)
NEUTS SEG NFR BLD: 63 % (ref 40–73)
NRBC # BLD: 0 K/UL (ref 0–0.01)
NRBC BLD-RTO: 0 PER 100 WBC
PLATELET # BLD AUTO: 216 K/UL (ref 135–420)
RBC # BLD AUTO: 5.61 M/UL (ref 4.2–5.3)
WBC # BLD AUTO: 9.5 K/UL (ref 4.6–13.2)

## 2023-07-16 PROCEDURE — 36415 COLL VENOUS BLD VENIPUNCTURE: CPT

## 2023-07-16 PROCEDURE — 85025 COMPLETE CBC W/AUTO DIFF WBC: CPT

## 2023-07-16 PROCEDURE — 6360000002 HC RX W HCPCS: Performed by: PHYSICIAN ASSISTANT

## 2023-07-16 PROCEDURE — 99232 SBSQ HOSP IP/OBS MODERATE 35: CPT | Performed by: HOSPITALIST

## 2023-07-16 PROCEDURE — 2580000003 HC RX 258: Performed by: PHYSICIAN ASSISTANT

## 2023-07-16 PROCEDURE — 2580000003 HC RX 258: Performed by: ORTHOPAEDIC SURGERY

## 2023-07-16 PROCEDURE — 1100000000 HC RM PRIVATE

## 2023-07-16 PROCEDURE — 6370000000 HC RX 637 (ALT 250 FOR IP): Performed by: PHYSICIAN ASSISTANT

## 2023-07-16 RX ADMIN — TERIFLUNOMIDE 14 MG: 14 TABLET, FILM COATED ORAL at 13:31

## 2023-07-16 RX ADMIN — SODIUM CHLORIDE, PRESERVATIVE FREE 10 ML: 5 INJECTION INTRAVENOUS at 21:09

## 2023-07-16 RX ADMIN — ACETAMINOPHEN 325MG 650 MG: 325 TABLET ORAL at 13:30

## 2023-07-16 RX ADMIN — ENOXAPARIN SODIUM 40 MG: 100 INJECTION SUBCUTANEOUS at 09:10

## 2023-07-16 RX ADMIN — METOPROLOL SUCCINATE 100 MG: 100 TABLET, EXTENDED RELEASE ORAL at 09:11

## 2023-07-16 RX ADMIN — GABAPENTIN 300 MG: 100 CAPSULE ORAL at 09:11

## 2023-07-16 RX ADMIN — GABAPENTIN 100 MG: 100 CAPSULE ORAL at 09:11

## 2023-07-16 RX ADMIN — SODIUM CHLORIDE, PRESERVATIVE FREE 10 ML: 5 INJECTION INTRAVENOUS at 09:10

## 2023-07-16 RX ADMIN — ATORVASTATIN CALCIUM 80 MG: 40 TABLET, FILM COATED ORAL at 21:08

## 2023-07-16 RX ADMIN — GABAPENTIN 100 MG: 100 CAPSULE ORAL at 13:30

## 2023-07-16 RX ADMIN — GABAPENTIN 100 MG: 100 CAPSULE ORAL at 21:08

## 2023-07-16 RX ADMIN — GABAPENTIN 300 MG: 100 CAPSULE ORAL at 21:09

## 2023-07-16 ASSESSMENT — PAIN SCALES - GENERAL
PAINLEVEL_OUTOF10: 0
PAINLEVEL_OUTOF10: 0
PAINLEVEL_OUTOF10: 4
PAINLEVEL_OUTOF10: 0
PAINLEVEL_OUTOF10: 2

## 2023-07-16 ASSESSMENT — PAIN DESCRIPTION - LOCATION: LOCATION: LEG

## 2023-07-16 ASSESSMENT — PAIN - FUNCTIONAL ASSESSMENT: PAIN_FUNCTIONAL_ASSESSMENT: ACTIVITIES ARE NOT PREVENTED

## 2023-07-16 ASSESSMENT — PAIN DESCRIPTION - DESCRIPTORS: DESCRIPTORS: THROBBING

## 2023-07-16 ASSESSMENT — PAIN DESCRIPTION - ORIENTATION: ORIENTATION: RIGHT

## 2023-07-16 NOTE — PROGRESS NOTES
PROGRESS NOTE      Patient: Israel Curling  MRN: [id number]  SSN: [id number]   YOB: 1960  Age: 58 y.o. Sex: female     Admit Date: 7/11/2023 LOS:  LOS: 5 days      POD # 3 S/P Procedure(s) with comments:  CLOSED REDUCTION RIGHT TIBIA AND FIBIA; LARGE EXTERNAL FIXATION; SYNTHES         ASSESSMENT/PLAN     Nesha Bradley A 58 y.o. old who is confortable and relatively alert and oriented. She had to be re reminded that she will need staged ORIF of her right distal tibia/fibula fracture. Dr Dragan Hill reviewed xrays with her Thursday with her daughter. She has TBI, forgetfulness. Plan for staged ORIF for 7/27/2023 Thursday. Continue ice right anterior distal tibia    Orthopaedic:  A. Pain Meds : Narcotic analgesics  B.  DVT Prophylaxis: SCD's, Lovenox  C. IV AB: Antibiotics: Ancef for 23 hours for 23 hours perioperative dosing prophylaxis for infection. D.  Weight Bear: 100% strict nonweightbearing to this right lower extremity  E.  Physician's following patient: Consultants following patients: Orthopedics    Israel Curling is on medicine service with Ortho consultation  F. PT/OT/ Intervention/ Consults:    PT/OT : [x]PT / [x] OT ordered // Weight Bearing Status: Nonweightbearing right lower extremity    DC disposition: TBD  G. Home health care orders: Remove her pin care instructions delineated carefully in the nursing section. H.  Pin care:      EXTERNAL FIXATION CARE INSTRUCTIONS     PIN CARE SITE CARE    FREQUENCY: EVERY OTHER DAY      1. Closed fracture of distal end of right tibia, unspecified fracture morphology, initial encounter    2. Syncope and collapse    3. Closed fracture of proximal end of right fibula, unspecified fracture morphology, initial encounter        FRACTURES    1. Please wash your hands  2. Place sterile gloves on  3. Clean pin sites ( all skin pin sites):    4. Remove dressings  5.   Remove Biopatch pads, clean pin sites with sterile saline

## 2023-07-16 NOTE — PROGRESS NOTES
ARU/IPR REFERRAL CONTACT NOTE  [facility]    RE:  Amna Guy    Message from [facility] left 23 at 5:48P and received 23 at 12:52PM.  Representative offering P2P. P2P valid through 23 12PM CST. Number to call for P2P [phone]. Please give provider name, member name, , and member ID ([id number]). Thank you for this referral.   Please do not hesitate to call if you need further information or have additional questions.      Regards,    Declan Juarez PTA   Admissions [facility]  [phone]

## 2023-07-17 ENCOUNTER — TELEPHONE (OUTPATIENT)
Age: 63
End: 2023-07-17

## 2023-07-17 PROCEDURE — 6370000000 HC RX 637 (ALT 250 FOR IP): Performed by: PHYSICIAN ASSISTANT

## 2023-07-17 PROCEDURE — 94761 N-INVAS EAR/PLS OXIMETRY MLT: CPT

## 2023-07-17 PROCEDURE — 2580000003 HC RX 258: Performed by: ORTHOPAEDIC SURGERY

## 2023-07-17 PROCEDURE — 2580000003 HC RX 258: Performed by: PHYSICIAN ASSISTANT

## 2023-07-17 PROCEDURE — 1100000000 HC RM PRIVATE

## 2023-07-17 PROCEDURE — 99232 SBSQ HOSP IP/OBS MODERATE 35: CPT | Performed by: HOSPITALIST

## 2023-07-17 PROCEDURE — 6360000002 HC RX W HCPCS: Performed by: PHYSICIAN ASSISTANT

## 2023-07-17 RX ADMIN — ACETAMINOPHEN 325MG 650 MG: 325 TABLET ORAL at 06:05

## 2023-07-17 RX ADMIN — SODIUM CHLORIDE, PRESERVATIVE FREE 10 ML: 5 INJECTION INTRAVENOUS at 08:41

## 2023-07-17 RX ADMIN — GABAPENTIN 100 MG: 100 CAPSULE ORAL at 08:39

## 2023-07-17 RX ADMIN — GABAPENTIN 300 MG: 100 CAPSULE ORAL at 08:39

## 2023-07-17 RX ADMIN — GABAPENTIN 100 MG: 100 CAPSULE ORAL at 13:52

## 2023-07-17 RX ADMIN — ATORVASTATIN CALCIUM 80 MG: 40 TABLET, FILM COATED ORAL at 20:29

## 2023-07-17 RX ADMIN — GABAPENTIN 300 MG: 100 CAPSULE ORAL at 20:28

## 2023-07-17 RX ADMIN — SODIUM CHLORIDE, PRESERVATIVE FREE 10 ML: 5 INJECTION INTRAVENOUS at 20:31

## 2023-07-17 RX ADMIN — TERIFLUNOMIDE 14 MG: 14 TABLET, FILM COATED ORAL at 08:44

## 2023-07-17 RX ADMIN — SODIUM CHLORIDE, PRESERVATIVE FREE 10 ML: 5 INJECTION INTRAVENOUS at 20:30

## 2023-07-17 RX ADMIN — ENOXAPARIN SODIUM 40 MG: 100 INJECTION SUBCUTANEOUS at 08:39

## 2023-07-17 RX ADMIN — GABAPENTIN 100 MG: 100 CAPSULE ORAL at 20:29

## 2023-07-17 ASSESSMENT — PAIN SCALES - GENERAL
PAINLEVEL_OUTOF10: 0

## 2023-07-17 NOTE — PROGRESS NOTES
PROGRESS NOTE      Patient: Sven Reddy  MRN: [id number]  SSN: [id number]   YOB: 1960  Age: 58 y.o. Sex: female     Admit Date: 7/11/2023 LOS:  LOS: 7 days     S/P Procedure(s) with comments:  CLOSED REDUCTION RIGHT TIBIA AND FIBIA; LARGE EXTERNAL FIXATION; SYNTHES         ASSESSMENT/PLAN     Spring Malik A 58 y.o. old who is currently resting. Plan for staged ORIF for 7/27/2023 Thursday. Orthopaedic:  A. Pain Meds : Narcotic analgesics  B.  DVT Prophylaxis: SCD's, Lovenox  C. IV AB: Antibiotics: Ancef for 23 hours for 23 hours perioperative dosing prophylaxis for infection. D.  Weight Bear: 100% strict nonweightbearing to this right lower extremity  E.  Physician's following patient: Consultants following patients: Orthopedics    Sven Reddy is on medicine service with Ortho consultation  F. PT/OT/ Intervention/ Consults:    PT/OT : [x]PT / [x] OT ordered // Weight Bearing Status: Nonweightbearing right lower extremity    DC disposition: TBD  G. I will for home health care orders: Remove her pin care instructions delineated carefully in the nursing section. H.  Pin care:    EXTERNAL FIXATION CARE INSTRUCTIONS     PIN CARE SITE CARE    FREQUENCY: EVERY OTHER DAY      1. Closed fracture of distal end of right tibia, unspecified fracture morphology, initial encounter    2. Syncope and collapse    3. Closed fracture of proximal end of right fibula, unspecified fracture morphology, initial encounter      FRACTURES    1. Please wash your hands  2. Place sterile gloves on  3. Clean pin sites ( all skin pin sites):    4. Remove dressings  5. Remove Biopatch pads, clean pin sites with sterile saline TO peroxide 50:50  OR USE MICRO [employer] HoldingsMIRIAN ANTIMICROBIAL FIRST AID ANTISEPTIC TO CLEAN PIN SITES. Use sterile Q tips to clean the sites  6. USE AEGIS CHG IMPREGNATED FOAM DISC  2.5 CM (1 inch)/ 7 mm hole external fixation DISC DRESSINGS.  STERILE 4x4 (AMD) needed.  9/4/15   Ar Automatic Reconciliation     Current Facility-Administered Medications   Medication Dose Route Frequency    sodium chloride flush 0.9 % injection 5-40 mL  5-40 mL IntraVENous 2 times per day    sodium chloride flush 0.9 % injection 5-40 mL  5-40 mL IntraVENous PRN    enoxaparin (LOVENOX) injection 40 mg  40 mg SubCUTAneous Daily    polyethylene glycol (GLYCOLAX) packet 17 g  17 g Oral Daily PRN    bisacodyl (DULCOLAX) suppository 10 mg  10 mg Rectal Daily PRN    acetaminophen (TYLENOL) tablet 650 mg  650 mg Oral Q6H PRN    Or    acetaminophen (TYLENOL) suppository 650 mg  650 mg Rectal Q6H PRN    morphine (PF) injection 2 mg  2 mg IntraVENous Q4H PRN    atorvastatin (LIPITOR) tablet 80 mg  80 mg Oral Nightly    gabapentin (NEURONTIN) capsule 100 mg  100 mg Oral TID    gabapentin (NEURONTIN) capsule 300 mg  300 mg Oral BID    metoprolol succinate (TOPROL XL) extended release tablet 100 mg  100 mg Oral Daily    Teriflunomide TABS 14 mg (Patient Supplied)  14 mg Oral Daily    sodium chloride flush 0.9 % injection 5-40 mL  5-40 mL IntraVENous 2 times per day    sodium chloride flush 0.9 % injection 5-40 mL  5-40 mL IntraVENous PRN    ondansetron (ZOFRAN-ODT) disintegrating tablet 4 mg  4 mg Oral Q8H PRN    Or    ondansetron (ZOFRAN) injection 4 mg  4 mg IntraVENous Q6H PRN     No Known Allergies            Armand Kocher, MD  7/18/2023  7:12 AM

## 2023-07-17 NOTE — CARE COORDINATION
SW contacted pt  to discuss dc plan., peer to peer.  is not open to SNF or home health. SW explained not aware of outcome of peer to peer. SW called and perfectserved ARU liaison for update.  states he will appeal any dc plans if not to ARU. SW will meet with  tomorrow to review dc plans.      MATT Lepe  Care Management

## 2023-07-17 NOTE — TELEPHONE ENCOUNTER
Pt's spouse called and states pt is going to be in rehab for 2 weeks but states she will need more than the 2 weeks and what can he do to get it extended. He can be reached at [phone].

## 2023-07-17 NOTE — PROGRESS NOTES
PROGRESS NOTE      Patient: Xiao Griggs  MRN: [id number]  SSN: [id number]   YOB: 1960  Age: 58 y.o. Sex: female     Admit Date: 7/11/2023 LOS:  LOS: 6 days     S/P Procedure(s) with comments:  CLOSED REDUCTION RIGHT TIBIA AND FIBIA; LARGE EXTERNAL FIXATION; SYNTHES         ASSESSMENT/PLAN     Ameya Stephen A 58 y.o. old who is currently resting. Plan for staged ORIF for 7/27/2023 Thursday. Orthopaedic:  A. Pain Meds : Narcotic analgesics  B.  DVT Prophylaxis: SCD's, Lovenox  C. IV AB: Antibiotics: Ancef for 23 hours for 23 hours perioperative dosing prophylaxis for infection. D.  Weight Bear: 100% strict nonweightbearing to this right lower extremity  E.  Physician's following patient: Consultants following patients: Orthopedics    Xiao Griggs is on medicine service with Ortho consultation  F. PT/OT/ Intervention/ Consults:    PT/OT : [x]PT / [x] OT ordered // Weight Bearing Status: Nonweightbearing right lower extremity    DC disposition: TBD  G. I will for home health care orders: Remove her pin care instructions delineated carefully in the nursing section. H.  Pin care:    EXTERNAL FIXATION CARE INSTRUCTIONS     PIN CARE SITE CARE    FREQUENCY: EVERY OTHER DAY      1. Closed fracture of distal end of right tibia, unspecified fracture morphology, initial encounter    2. Syncope and collapse    3. Closed fracture of proximal end of right fibula, unspecified fracture morphology, initial encounter      FRACTURES    1. Please wash your hands  2. Place sterile gloves on  3. Clean pin sites ( all skin pin sites):    4. Remove dressings  5. Remove Biopatch pads, clean pin sites with sterile saline TO peroxide 50:50  OR USE MICRO [employer]IRIAN ANTIMICROBIAL FIRST AID ANTISEPTIC TO CLEAN PIN SITES. Use sterile Q tips to clean the sites  6. USE AEGIS CHG IMPREGNATED FOAM DISC  2.5 CM (1 inch)/ 7 mm hole external fixation DISC DRESSINGS.  STERILE 4x4 (AMD) History    Not on file   Tobacco Use    Smoking status: Never    Smokeless tobacco: Never   Substance and Sexual Activity    Alcohol use: No    Drug use: No    Sexual activity: Not on file   Other Topics Concern    Not on file   Social History Narrative    Not on file     Social Determinants of Health     Financial Resource Strain: Not on file   Food Insecurity: Not on file   Transportation Needs: Not on file   Physical Activity: Not on file   Stress: Not on file   Social Connections: Not on file   Intimate Partner Violence: Not on file   Housing Stability: Not on file      Family History   Problem Relation Age of Onset    High Cholesterol Mother     High Cholesterol Brother     Diabetes Father     Rheum Arthritis Mother     Stroke Mother     Heart Disease Father     Hypertension Mother     Diabetes Mother     Rheum Arthritis Sister      Prior to Admission medications    Medication Sig Start Date End Date Taking? Authorizing Provider   Teriflunomide 14 MG TABS Take 14 mg by mouth daily   Yes Historical Provider, MD   chlorthalidone (HYGROTON) 25 MG tablet Take 1 tablet by mouth daily   Yes Historical Provider, MD   gabapentin (NEURONTIN) 300 MG capsule Take 1 capsule by mouth in the morning and at bedtime. Max Daily Amount: 600 mg   Yes Historical Provider, MD   metoprolol succinate (TOPROL XL) 100 MG extended release tablet Take 1 tablet by mouth daily   Yes Historical Provider, MD   vitamin D 25 MCG (1000 UT) CAPS Take by mouth   Yes Historical Provider, MD   atorvastatin (LIPITOR) 80 MG tablet Take 1 tablet by mouth at bedtime    Ar Automatic Reconciliation   diclofenac sodium (VOLTAREN) 1 % GEL APPLY TWO GRAMS TOPICALLY TO AFFECTED AREA(S) THREE TIMES A DAY 1/29/21   Ar Automatic Reconciliation   gabapentin (NEURONTIN) 100 MG capsule Take 1 capsule by mouth 3 times daily. Ar Automatic Reconciliation   oxyCODONE (ROXICODONE) 5 MG immediate release tablet Take 10 mg by mouth every 4 hours as needed.  9/4/15   Ar

## 2023-07-18 PROCEDURE — 97530 THERAPEUTIC ACTIVITIES: CPT

## 2023-07-18 PROCEDURE — 99024 POSTOP FOLLOW-UP VISIT: CPT | Performed by: ORTHOPAEDIC SURGERY

## 2023-07-18 PROCEDURE — 97164 PT RE-EVAL EST PLAN CARE: CPT

## 2023-07-18 PROCEDURE — 2580000003 HC RX 258: Performed by: ORTHOPAEDIC SURGERY

## 2023-07-18 PROCEDURE — 6370000000 HC RX 637 (ALT 250 FOR IP): Performed by: PHYSICIAN ASSISTANT

## 2023-07-18 PROCEDURE — 2580000003 HC RX 258: Performed by: PHYSICIAN ASSISTANT

## 2023-07-18 PROCEDURE — 1100000000 HC RM PRIVATE

## 2023-07-18 PROCEDURE — 6360000002 HC RX W HCPCS: Performed by: PHYSICIAN ASSISTANT

## 2023-07-18 PROCEDURE — 99232 SBSQ HOSP IP/OBS MODERATE 35: CPT | Performed by: HOSPITALIST

## 2023-07-18 PROCEDURE — 97535 SELF CARE MNGMENT TRAINING: CPT

## 2023-07-18 RX ADMIN — GABAPENTIN 100 MG: 100 CAPSULE ORAL at 08:17

## 2023-07-18 RX ADMIN — GABAPENTIN 300 MG: 100 CAPSULE ORAL at 20:35

## 2023-07-18 RX ADMIN — GABAPENTIN 100 MG: 100 CAPSULE ORAL at 20:35

## 2023-07-18 RX ADMIN — GABAPENTIN 100 MG: 100 CAPSULE ORAL at 13:51

## 2023-07-18 RX ADMIN — SODIUM CHLORIDE, PRESERVATIVE FREE 10 ML: 5 INJECTION INTRAVENOUS at 08:21

## 2023-07-18 RX ADMIN — ATORVASTATIN CALCIUM 80 MG: 40 TABLET, FILM COATED ORAL at 20:24

## 2023-07-18 RX ADMIN — SODIUM CHLORIDE, PRESERVATIVE FREE 10 ML: 5 INJECTION INTRAVENOUS at 20:32

## 2023-07-18 RX ADMIN — ACETAMINOPHEN 325MG 650 MG: 325 TABLET ORAL at 14:04

## 2023-07-18 RX ADMIN — GABAPENTIN 300 MG: 100 CAPSULE ORAL at 08:18

## 2023-07-18 RX ADMIN — ENOXAPARIN SODIUM 40 MG: 100 INJECTION SUBCUTANEOUS at 09:00

## 2023-07-18 RX ADMIN — SODIUM CHLORIDE, PRESERVATIVE FREE 10 ML: 5 INJECTION INTRAVENOUS at 08:22

## 2023-07-18 RX ADMIN — METOPROLOL SUCCINATE 100 MG: 100 TABLET, EXTENDED RELEASE ORAL at 08:18

## 2023-07-18 RX ADMIN — TERIFLUNOMIDE 14 MG: 14 TABLET, FILM COATED ORAL at 08:18

## 2023-07-18 ASSESSMENT — PAIN SCALES - GENERAL
PAINLEVEL_OUTOF10: 0
PAINLEVEL_OUTOF10: 3
PAINLEVEL_OUTOF10: 0
PAINLEVEL_OUTOF10: 0
PAINLEVEL_OUTOF10: 2

## 2023-07-18 ASSESSMENT — PAIN DESCRIPTION - DESCRIPTORS: DESCRIPTORS: ACHING

## 2023-07-18 ASSESSMENT — PAIN - FUNCTIONAL ASSESSMENT: PAIN_FUNCTIONAL_ASSESSMENT: ACTIVITIES ARE NOT PREVENTED

## 2023-07-18 ASSESSMENT — PAIN DESCRIPTION - ORIENTATION: ORIENTATION: RIGHT

## 2023-07-18 ASSESSMENT — PAIN DESCRIPTION - LOCATION: LOCATION: LEG

## 2023-07-18 NOTE — PLAN OF CARE
Problem: Occupational Therapy - Adult  Goal: By Discharge: Performs self-care activities at highest level of function for planned discharge setting. See evaluation for individualized goals. Description: Occupational Therapy Goals:  Initiated 7/12/2023 to be met within 7-10 days. 1.  Patient will perform bed mobility in preparation for ADLs with supervision/set-up. 2.  Patient will perform upper body dressing with modified independence. 3.  Patient will perform functional activity/grooming task sitting EOB with supervision/set-up, G balance. 4.  Patient will perform toilet transfers with minimal assistance/contact guard assist maintaining NWB. 5.  Patient will perform all aspects of toileting with minimal assistance/contact guard assist.  6.  Patient will participate in upper extremity therapeutic exercise/activities with supervision/set-up for 8-10 minutes to increase ROM/strength for ADLs. 7.  Patient will utilize energy conservation techniques during functional activities with verbal cues. PLOF: mPatient a poor historian. Per chart review, pt received assist for ADLs.    Outcome: Progressing   OCCUPATIONAL THERAPY TREATMENT    Patient: Coit Fabry (14 y.o. 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

## 2023-07-18 NOTE — PROGRESS NOTES
ARU/IPR REFERRAL CONTACT NOTE  [address] [place] [address] for Physical Rehabilitation    RE:  Ralph Goldyneris     Thank you for the opportunity to review this patient's case for admission to [address] [place] [address] for Physical Rehabilitation. Based on our pre-admission screening:     [ Sudhir Carbon   Comments:  Pending peer to peer outcome. Please be advised admission to ARU will be based on meeting admission requirements, authorization from  [facility]. Thank you for this referral.   Please do not hesitate to call if you need further information or have additional questions.      Regards,    Timur Carrion PTA   Admissions [facility] for Physical Rehabilitation  [phone]

## 2023-07-18 NOTE — CARE COORDINATION
4:30 pm   SW contacted  again. Explaiend denied peer to peer and option of SNF or home health. Pt  would like SNF placement. SW emailed spouse insurance appeal for peer to peer and SNF choices. No particular area but preference [facility]. SW referrals to [facility] [employer] and [place]. 2:30 pm   SW left a message with  to discuss dc plan. Awaiting return call.      MATT Lepe  Care Management

## 2023-07-18 NOTE — PROGRESS NOTES
Comprehensive Nutrition Assessment    Type and Reason for Visit:  Initial, RD Nutrition Re-Screen/LOS    Nutrition Recommendations/Plan:   Modify Current Diet- rec Regular Diet d/c carb restriction. Continue to monitor tolerance of PO, weight, labs, and plan of care during admission. Malnutrition Assessment:  Malnutrition Status:  No malnutrition (07/18/23 1211)      Nutrition History and Allergies: PMHx: Hypertension & Multiple sclerosis. Pt unsure of intake & UBW, per H&P, pt has issues with memory since a prior TBI. Hx obtained from Pt  via phone. Pt with good appetite. UBW ~185 lb x 1 year and weight stable. Wt hx: 189 lb (07/18/23) obtained by RDE. Based on reported UBW, pt weight stable. Limited weight hx. Will continue to monitor intake. NKFA. Nutrition Assessment:    Pt presents with complaints of fall and resultant right ankle pain. 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 LOS. Pt s/p closed reduction right tibia & fibia; large external fixation 7/11/23. Pt seen at bedside. Pt reports good appetite, consuming the majority of meals. Observed pt breakfast tray with % consumed. PO documented in flowsheets, pt intake fluctuating consuming % of meals; last filed (7/17), % of dinner. Obtained weight 86.0. Per  note 7/17- awaiting ARU update for placement. Will continue to monitor intake. Nutrition Related Findings:    Last BM (including prior to admit): 07/17/23. Edema: Right lower extremity. Pertinent Meds: Lipitor, lovenox. Pertinent Labs: no new labs, previous labs (wnl). Wound Type: Surgical Incision       Current Nutrition Intake & Therapies:    Average Meal Intake: %, 51-75%  Average Supplements Intake: None Ordered  ADULT DIET;  Regular; 3 carb choices (45 gm/meal)    Anthropometric Measures:  Height: 5' 3\" (160 cm)  Ideal Body Weight (IBW): 115 lbs (52 kg)    Admission Body Weight: 178 lb

## 2023-07-18 NOTE — PROGRESS NOTES
Mr. Jackelin Smith on ARU. Requesting to speak with this writer. He states he just spoke with Dr Carmen Chatman who said peer to peer declined for IPR. Referred Mr. Jackelin Smith to inpatient acute care management liasion, April E. for further assistance regarding post acute options for care.

## 2023-07-19 DIAGNOSIS — S82.201A TIBIA/FIBULA FRACTURE, RIGHT, CLOSED, INITIAL ENCOUNTER: Primary | ICD-10-CM

## 2023-07-19 DIAGNOSIS — S82.401A TIBIA/FIBULA FRACTURE, RIGHT, CLOSED, INITIAL ENCOUNTER: Primary | ICD-10-CM

## 2023-07-19 LAB
ANION GAP SERPL CALC-SCNC: 2 MMOL/L (ref 3–18)
BUN SERPL-MCNC: 13 MG/DL (ref 7–18)
BUN/CREAT SERPL: 23 (ref 12–20)
CALCIUM SERPL-MCNC: 8.7 MG/DL (ref 8.5–10.1)
CHLORIDE SERPL-SCNC: 111 MMOL/L (ref 100–111)
CO2 SERPL-SCNC: 28 MMOL/L (ref 21–32)
CREAT SERPL-MCNC: 0.57 MG/DL (ref 0.6–1.3)
ERYTHROCYTE [DISTWIDTH] IN BLOOD BY AUTOMATED COUNT: 15.7 % (ref 11.6–14.5)
GLUCOSE SERPL-MCNC: 107 MG/DL (ref 74–99)
HCT VFR BLD AUTO: 33.2 % (ref 35–45)
HGB BLD-MCNC: 10.3 G/DL (ref 12–16)
MCH RBC QN AUTO: 22.2 PG (ref 24–34)
MCHC RBC AUTO-ENTMCNC: 31 G/DL (ref 31–37)
MCV RBC AUTO: 71.4 FL (ref 78–100)
NRBC # BLD: 0 K/UL (ref 0–0.01)
NRBC BLD-RTO: 0 PER 100 WBC
PLATELET # BLD AUTO: 209 K/UL (ref 135–420)
PMV BLD AUTO: 11 FL (ref 9.2–11.8)
POTASSIUM SERPL-SCNC: 3.6 MMOL/L (ref 3.5–5.5)
RBC # BLD AUTO: 4.65 M/UL (ref 4.2–5.3)
SODIUM SERPL-SCNC: 141 MMOL/L (ref 136–145)
WBC # BLD AUTO: 7 K/UL (ref 4.6–13.2)

## 2023-07-19 PROCEDURE — 1100000000 HC RM PRIVATE

## 2023-07-19 PROCEDURE — 97168 OT RE-EVAL EST PLAN CARE: CPT

## 2023-07-19 PROCEDURE — 80048 BASIC METABOLIC PNL TOTAL CA: CPT

## 2023-07-19 PROCEDURE — 99232 SBSQ HOSP IP/OBS MODERATE 35: CPT | Performed by: HOSPITALIST

## 2023-07-19 PROCEDURE — 2580000003 HC RX 258: Performed by: PHYSICIAN ASSISTANT

## 2023-07-19 PROCEDURE — 36415 COLL VENOUS BLD VENIPUNCTURE: CPT

## 2023-07-19 PROCEDURE — 97110 THERAPEUTIC EXERCISES: CPT

## 2023-07-19 PROCEDURE — 6370000000 HC RX 637 (ALT 250 FOR IP): Performed by: PHYSICIAN ASSISTANT

## 2023-07-19 PROCEDURE — 2580000003 HC RX 258: Performed by: ORTHOPAEDIC SURGERY

## 2023-07-19 PROCEDURE — 99024 POSTOP FOLLOW-UP VISIT: CPT | Performed by: ORTHOPAEDIC SURGERY

## 2023-07-19 PROCEDURE — 85027 COMPLETE CBC AUTOMATED: CPT

## 2023-07-19 PROCEDURE — 6360000002 HC RX W HCPCS: Performed by: PHYSICIAN ASSISTANT

## 2023-07-19 PROCEDURE — 94761 N-INVAS EAR/PLS OXIMETRY MLT: CPT

## 2023-07-19 RX ADMIN — ATORVASTATIN CALCIUM 80 MG: 40 TABLET, FILM COATED ORAL at 20:57

## 2023-07-19 RX ADMIN — SODIUM CHLORIDE, PRESERVATIVE FREE 10 ML: 5 INJECTION INTRAVENOUS at 20:58

## 2023-07-19 RX ADMIN — TERIFLUNOMIDE 14 MG: 14 TABLET, FILM COATED ORAL at 08:51

## 2023-07-19 RX ADMIN — SODIUM CHLORIDE, PRESERVATIVE FREE 10 ML: 5 INJECTION INTRAVENOUS at 08:54

## 2023-07-19 RX ADMIN — ENOXAPARIN SODIUM 40 MG: 100 INJECTION SUBCUTANEOUS at 08:51

## 2023-07-19 RX ADMIN — SODIUM CHLORIDE, PRESERVATIVE FREE 10 ML: 5 INJECTION INTRAVENOUS at 08:53

## 2023-07-19 RX ADMIN — GABAPENTIN 100 MG: 100 CAPSULE ORAL at 20:56

## 2023-07-19 RX ADMIN — GABAPENTIN 300 MG: 100 CAPSULE ORAL at 20:55

## 2023-07-19 RX ADMIN — GABAPENTIN 100 MG: 100 CAPSULE ORAL at 13:38

## 2023-07-19 RX ADMIN — GABAPENTIN 100 MG: 100 CAPSULE ORAL at 08:51

## 2023-07-19 RX ADMIN — GABAPENTIN 300 MG: 100 CAPSULE ORAL at 08:51

## 2023-07-19 RX ADMIN — METOPROLOL SUCCINATE 100 MG: 100 TABLET, EXTENDED RELEASE ORAL at 08:51

## 2023-07-19 ASSESSMENT — PAIN SCALES - GENERAL
PAINLEVEL_OUTOF10: 0

## 2023-07-19 NOTE — PROGRESS NOTES
Theodora Funk   [email or web address] [facility] [address]        Orders Placed This Encounter    SCHEDULE SURGERY     Scheduling Instructions:      PATIENT NAME: Theodora Funk       PROCEDURE LOCATION: [facility]      TIME LINE FOR PROCEDURE :Elective      LENGTH OF PROCEDURE: 1.5 HOURS      PROCEDURE DATE: 7/27/2023       ADMIT: 23 hours same day admit       DIAGNOSIS: Closed fracture of right distal tibia and fibula fractures. Tibia/fibula fracture, right, closed, initial encounter               PROCEDURE:  Remove external fixation 20694, right tibia/fibula open reduction internal fixation  29059, [id number], possible allograft bone grafting.             ANESTHESIA: general anesthesia and regional anesthesia             PATIENT POSITION:  supine             EQUIPMENT:  C-Arm, SYNTHES             HARDWARE: SYNTHES  TIBIA PLATES      ALLOGRAFT: VIVIGEN       C ARM: YES LARGE C ARM       LABS PREOP       CLEARANCES:  no      Is Patient on Blood Thinners?:  YES           Jose Waggoner MD  7/19/2023  7:23 AM

## 2023-07-19 NOTE — PLAN OF CARE
Problem: Discharge Planning  Goal: Discharge to home or other facility with appropriate resources  Outcome: Progressing  Flowsheets (Taken 7/18/2023 0900 by Heike Dominguez RN)  Discharge to home or other facility with appropriate resources: Identify barriers to discharge with patient and caregiver     Problem: Safety - Adult  Goal: Free from fall injury  Outcome: Progressing     Problem: ABCDS Injury Assessment  Goal: Absence of physical injury  Outcome: Progressing     Problem: Skin/Tissue Integrity  Goal: Absence of new skin breakdown  Description: 1. Monitor for areas of redness and/or skin breakdown  2. Assess vascular access sites hourly  3. Every 4-6 hours minimum:  Change oxygen saturation probe site  4. Every 4-6 hours:  If on nasal continuous positive airway pressure, respiratory therapy assess nares and determine need for appliance change or resting period. Outcome: Progressing     Problem: Occupational Therapy - Adult  Goal: By Discharge: Performs self-care activities at highest level of function for planned discharge setting. See evaluation for individualized goals. Description: Occupational Therapy Goals:  Initiated 7/12/2023 to be met within 7-10 days. 1.  Patient will perform bed mobility in preparation for ADLs with supervision/set-up. 2.  Patient will perform upper body dressing with modified independence. 3.  Patient will perform functional activity/grooming task sitting EOB with supervision/set-up, G balance. 4.  Patient will perform toilet transfers with minimal assistance/contact guard assist maintaining NWB. 5.  Patient will perform all aspects of toileting with minimal assistance/contact guard assist.  6.  Patient will participate in upper extremity therapeutic exercise/activities with supervision/set-up for 8-10 minutes to increase ROM/strength for ADLs. 7.  Patient will utilize energy conservation techniques during functional activities with verbal cues.         PLOF: CNS and Spiritual Care consult, as indicated  Outcome: Progressing     Problem: Nutrition Deficit:  Goal: Optimize nutritional status  Outcome: Progressing  Flowsheets (Taken 7/18/2023 1208 by Bimal Price RD)  Nutrient intake appropriate for improving, restoring, or maintaining nutritional needs: Monitor oral intake, labs, and treatment plans

## 2023-07-19 NOTE — CARE COORDINATION
HARMONY spoke with  again this morning. He was unable to review list. HARMONY reminided he would need to give selections today. He will review list and call or email SW back in one hour.      MATT Lepe  Care Management

## 2023-07-19 NOTE — CARE COORDINATION
SNF referrals recevied from :  [address] and [facility] @ [address] [facility]  [facility] [address]    Referrals sent in [facility] and [facility].     MATT Lepe  Care Management

## 2023-07-19 NOTE — PLAN OF CARE
Problem: Occupational Therapy - Adult  Goal: By Discharge: Performs self-care activities at highest level of function for planned discharge setting. See evaluation for individualized goals. Description: Occupational Therapy Goals:  Initiated 7/12/2023 to be met within 7-10 days, re-evaluation 7/19/2023 - pt making slow progress towards goals d/t impaired cognition, goal #4 modified, continue OT plan of care    1. Patient will perform bed mobility in preparation for ADLs with supervision/set-up. 2.  Patient will perform upper body dressing with modified independence. 3.  Patient will perform functional activity/grooming task sitting EOB with supervision/set-up, G balance. 4.  Patient will perform bedside commode transfers with minimal assistance/contact guard assist maintaining NWB using RW. 5.  Patient will perform all aspects of toileting with minimal assistance/contact guard assist.  6.  Patient will participate in upper extremity therapeutic exercise/activities with supervision/set-up for 8-10 minutes to increase ROM/strength for ADLs. 7.  Patient will utilize energy conservation techniques during functional activities with verbal cues. PLOF: mPatient a poor historian. Per chart review, pt received assist for ADLs.    Outcome: Progressing   OCCUPATIONAL THERAPY RE-EVALUATION    Patient: Taiwo James (51 y.o. 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 After treatment:   [] Patient left in no apparent distress sitting up in chair  [x] Patient left in no apparent distress in bed  [x] Call bell left within reach  [x] Nursing notified  [] Caregiver present  [x] Bed alarm activated    COMMUNICATION/EDUCATION:   Patient Education  Education Given To: Patient  Education Provided: Role of Therapy;Plan of Care;Precautions; ADL Adaptive Strategies;Transfer Training;Energy Conservation; Fall Prevention Strategies; Home Exercise Program;Orientation  Education Method: Demonstration;Verbal;Teach Back  Barriers to Learning: Cognition  Education Outcome: Continued education needed    Thank you for this referral.  Allegra Skiff, OT  Minutes: 22

## 2023-07-19 NOTE — PROGRESS NOTES
PROGRESS NOTE      Patient: Barbara Leyva  MRN: [id number]  SSN: [id number]   YOB: 1960  Age: 58 y.o. Sex: female     Admit Date: 7/11/2023 LOS:  LOS: 8 days     S/P Procedure(s) with comments:  CLOSED REDUCTION RIGHT TIBIA AND FIBIA; LARGE EXTERNAL FIXATION; SYNTHES         ASSESSMENT/PLAN     Juliane Velez A 58 y.o. old who is currently resting. Plan for staged ORIF for 7/27/2023 Thursday. Pt is being evaluated by ARU or SNF.; I spoke with her  yesterday re surgical plan for Barbara Leyva. Pin care continues for Ext Fix. Orthopaedic:  A. Pain Meds : Narcotic analgesics  B.  DVT Prophylaxis: SCD's, Lovenox  C. IV AB: Antibiotics: Ancef for 23 hours for 23 hours perioperative dosing prophylaxis for infection. D.  Weight Bear: 100% strict nonweightbearing to this right lower extremity  E.  Physician's following patient: Consultants following patients: Orthopedics    Barbara Leyva is on medicine service with Ortho consultation  F. PT/OT/ Intervention/ Consults:    PT/OT : [x]PT / [x] OT ordered // Weight Bearing Status: Nonweightbearing right lower extremity    DC disposition: TBD  G. I will for home health care orders: Remove her pin care instructions delineated carefully in the nursing section. H.  Pin care:      EXTERNAL FIXATION CARE INSTRUCTIONS     PIN CARE SITE CARE    FREQUENCY: EVERY OTHER DAY      1. Closed fracture of distal end of right tibia, unspecified fracture morphology, initial encounter    2. Syncope and collapse    3. Closed fracture of proximal end of right fibula, unspecified fracture morphology, initial encounter      FRACTURES    1. Please wash your hands  2. Place sterile gloves on  3. Clean pin sites ( all skin pin sites):    4. Remove dressings  5. Remove Biopatch pads, clean pin sites with sterile saline TO peroxide 50:50  OR USE MICRO CHARLY ANTIMICROBIAL FIRST AID ANTISEPTIC TO CLEAN PIN SITES.   Use sterile Q tips to clean

## 2023-07-20 LAB
ANION GAP SERPL CALC-SCNC: 3 MMOL/L (ref 3–18)
BUN SERPL-MCNC: 12 MG/DL (ref 7–18)
BUN/CREAT SERPL: 21 (ref 12–20)
CALCIUM SERPL-MCNC: 8.8 MG/DL (ref 8.5–10.1)
CHLORIDE SERPL-SCNC: 110 MMOL/L (ref 100–111)
CO2 SERPL-SCNC: 28 MMOL/L (ref 21–32)
CREAT SERPL-MCNC: 0.57 MG/DL (ref 0.6–1.3)
ERYTHROCYTE [DISTWIDTH] IN BLOOD BY AUTOMATED COUNT: 15.5 % (ref 11.6–14.5)
GLUCOSE SERPL-MCNC: 103 MG/DL (ref 74–99)
HCT VFR BLD AUTO: 33.1 % (ref 35–45)
HGB BLD-MCNC: 10.2 G/DL (ref 12–16)
MCH RBC QN AUTO: 22.1 PG (ref 24–34)
MCHC RBC AUTO-ENTMCNC: 30.8 G/DL (ref 31–37)
MCV RBC AUTO: 71.6 FL (ref 78–100)
NRBC # BLD: 0 K/UL (ref 0–0.01)
NRBC BLD-RTO: 0 PER 100 WBC
PLATELET # BLD AUTO: 235 K/UL (ref 135–420)
PMV BLD AUTO: 11 FL (ref 9.2–11.8)
POTASSIUM SERPL-SCNC: 3.7 MMOL/L (ref 3.5–5.5)
RBC # BLD AUTO: 4.62 M/UL (ref 4.2–5.3)
SODIUM SERPL-SCNC: 141 MMOL/L (ref 136–145)
WBC # BLD AUTO: 7 K/UL (ref 4.6–13.2)

## 2023-07-20 PROCEDURE — 2580000003 HC RX 258: Performed by: PHYSICIAN ASSISTANT

## 2023-07-20 PROCEDURE — 36415 COLL VENOUS BLD VENIPUNCTURE: CPT

## 2023-07-20 PROCEDURE — 97530 THERAPEUTIC ACTIVITIES: CPT

## 2023-07-20 PROCEDURE — 80048 BASIC METABOLIC PNL TOTAL CA: CPT

## 2023-07-20 PROCEDURE — 99024 POSTOP FOLLOW-UP VISIT: CPT | Performed by: ORTHOPAEDIC SURGERY

## 2023-07-20 PROCEDURE — 85027 COMPLETE CBC AUTOMATED: CPT

## 2023-07-20 PROCEDURE — 2580000003 HC RX 258: Performed by: ORTHOPAEDIC SURGERY

## 2023-07-20 PROCEDURE — 6360000002 HC RX W HCPCS: Performed by: PHYSICIAN ASSISTANT

## 2023-07-20 PROCEDURE — 6370000000 HC RX 637 (ALT 250 FOR IP): Performed by: PHYSICIAN ASSISTANT

## 2023-07-20 PROCEDURE — 97535 SELF CARE MNGMENT TRAINING: CPT

## 2023-07-20 PROCEDURE — 1100000000 HC RM PRIVATE

## 2023-07-20 RX ADMIN — GABAPENTIN 100 MG: 100 CAPSULE ORAL at 20:37

## 2023-07-20 RX ADMIN — ENOXAPARIN SODIUM 40 MG: 100 INJECTION SUBCUTANEOUS at 12:00

## 2023-07-20 RX ADMIN — TERIFLUNOMIDE 14 MG: 14 TABLET, FILM COATED ORAL at 08:42

## 2023-07-20 RX ADMIN — GABAPENTIN 100 MG: 100 CAPSULE ORAL at 08:41

## 2023-07-20 RX ADMIN — GABAPENTIN 300 MG: 100 CAPSULE ORAL at 20:37

## 2023-07-20 RX ADMIN — SODIUM CHLORIDE, PRESERVATIVE FREE 20 ML: 5 INJECTION INTRAVENOUS at 20:36

## 2023-07-20 RX ADMIN — ATORVASTATIN CALCIUM 80 MG: 40 TABLET, FILM COATED ORAL at 20:37

## 2023-07-20 RX ADMIN — SODIUM CHLORIDE, PRESERVATIVE FREE 5 ML: 5 INJECTION INTRAVENOUS at 08:45

## 2023-07-20 RX ADMIN — SODIUM CHLORIDE, PRESERVATIVE FREE 5 ML: 5 INJECTION INTRAVENOUS at 08:00

## 2023-07-20 RX ADMIN — METOPROLOL SUCCINATE 100 MG: 100 TABLET, EXTENDED RELEASE ORAL at 08:41

## 2023-07-20 RX ADMIN — GABAPENTIN 100 MG: 100 CAPSULE ORAL at 13:29

## 2023-07-20 RX ADMIN — GABAPENTIN 300 MG: 100 CAPSULE ORAL at 08:41

## 2023-07-20 RX ADMIN — SODIUM CHLORIDE, PRESERVATIVE FREE 10 ML: 5 INJECTION INTRAVENOUS at 20:38

## 2023-07-20 ASSESSMENT — PAIN SCALES - GENERAL
PAINLEVEL_OUTOF10: 2
PAINLEVEL_OUTOF10: 1
PAINLEVEL_OUTOF10: 1

## 2023-07-20 NOTE — PLAN OF CARE
ADLs.    7.  Patient will utilize energy conservation techniques during functional activities with verbal cues. PLOF: mPatient a poor historian. Per chart review, pt received assist for ADLs.   7/20/2023 1403 by PRIETO Mata  Outcome: Progressing     Problem: Physical Therapy - Adult  Goal: By Discharge: Performs mobility at highest level of function for planned discharge setting. See evaluation for individualized goals. Description: Physical Therapy Goals:  Initiated 7/12/2023, re-evaluated 7/18/23 and goals adjusted as needed to be met within 7-10 days. 1.  Patient will move from supine to sit and sit to supine , scoot up and down, and roll side to side in bed with minimal assistance/contact guard assist.    2.  Patient will transfer from bed to chair and chair to bed with minimal assistance/contact guard assist using the least restrictive device. 3.  Patient will perform sit to stand with Laura . 4. Patient will ambulate with modA for 3 feet with the least restrictive device. PLOF: Pt reports Marta with rollator, electric RW?, lives with  in 1 story house with ramp to enter. 7/20/2023 1303 by Efren Mackay PT  Outcome: Progressing     Problem: Confusion  Goal: Confusion, delirium, dementia, or psychosis is improved or at baseline  Description: INTERVENTIONS:  1. Assess for possible contributors to thought disturbance, including medications, impaired vision or hearing, underlying metabolic abnormalities, dehydration, psychiatric diagnoses, and notify attending LIP  2. [place] high risk fall precautions, as indicated  3. Provide frequent short contacts to provide reality reorientation, refocusing and direction  4. Decrease environmental stimuli, including noise as appropriate  5. Monitor and intervene to maintain adequate nutrition, hydration, elimination, sleep and activity  6.  If unable to ensure safety without constant attention obtain sitter and review sitter

## 2023-07-20 NOTE — PLAN OF CARE
of gait belt and intermittent assist with keeping RLE off floor. Pt returned to bed at end of session and left with all needs met, will continue to follow per POC. Progression toward goals:   [x]      Improving appropriately and progressing toward goals  []      Improving slowly and progressing toward goals  []      Not making progress toward goals and plan of care will be adjusted     PLAN:  Patient continues to benefit from skilled intervention to address the above impairments. Continue treatment per established plan of care. Further Equipment Recommendations for Discharge: bedside commode, shower chair, rolling walker, and wheelchair      AMPAC:    12    Current research shows that an AM-PAC score of 17 (13 without stairs) or less is not associated with a discharge to the patient's home setting. Based on an AM-PAC score and their current functional mobility deficits, it is recommended that the patient have 3-5 sessions per week of Physical Therapy at d/c to increase the patient's independence. This AMPAC score should be considered in conjunction with interdisciplinary team recommendations to determine the most appropriate discharge setting. Patient's social support, diagnosis, medical stability, and prior level of function should also be taken into consideration. SUBJECTIVE:   Patient stated, \"I cant put weight on this leg. \"    OBJECTIVE DATA SUMMARY:   Critical Behavior:  Orientation  Overall Orientation Status: Impaired  Orientation Level: Oriented to person;Oriented to place;Oriented to situation       Functional Mobility Training:  Bed Mobility:  Bed Mobility Training  Supine to Sit: Minimum assistance  Sit to Supine: Minimum assistance  Scooting: Minimum assistance  Transfers:  Transfer Training  Transfer Training: Yes  Bed to Chair: Moderate assistance;Minimum assistance  Balance:  Balance  Sitting: Intact  Sitting - Static: Good (unsupported)  Sitting - Dynamic: Good (unsupported)        Pain:  Pain level pre-treatment: 0/10  Pain level post-treatment: 0/10   Pain Intervention(s): Rest, Ice, Repositioning   Response to intervention: Nurse notified    Activity Tolerance:   Activity Tolerance: Patient tolerated treatment well  Please refer to the flowsheet for vital signs taken during this treatment. After treatment:   [] Patient left in no apparent distress sitting up in chair  [x] Patient left in no apparent distress in bed  [x] Call bell left within reach  [x] Nursing notified  [] Caregiver present  [x] Bed alarm activated  [] SCDs applied      COMMUNICATION/EDUCATION:   Patient Education  Education Given To: Patient; Family  Education Provided: Role of Therapy;Transfer Training;Equipment;Plan of Care;Energy Conservation; Fall Prevention Strategies      Sylwia Fraction, PT  Minutes: 25

## 2023-07-20 NOTE — PROGRESS NOTES
PROGRESS NOTE      Patient: Jack Randle  MRN: [id number]  SSN: [id number]   YOB: 1960  Age: 58 y.o. Sex: female     Admit Date: 7/11/2023 LOS:  LOS: 9 days     S/P Procedure(s) with comments:  CLOSED REDUCTION RIGHT TIBIA AND FIBIA; LARGE EXTERNAL FIXATION; SYNTHES         ASSESSMENT/PLAN     Deandre Dasilva A 58 y.o. old who is currently resting. Plan for staged ORIF for [facility]  7/26/2023. Pt is being evaluated by ARU or SNF. I met with and spoke with her  7/20/2023 1:26 PM  in the presence of Jack Randle. I discussed my plan:  re surgical plan for Jack Randle:     PROCEDURE:  Remove external fixation 27115, right tibia/fibula open reduction internal fixation  27758, 20032, possible allograft bone grafting. Pin care continues for Ext Fix. Orthopaedic:  A. Pain Meds : Narcotic analgesics  B.  DVT Prophylaxis: SCD's, Lovenox  C. IV AB: Antibiotics: Ancef for 23 hours for 23 hours perioperative dosing prophylaxis for infection. D.  Weight Bear: 100% strict nonweightbearing to this right lower extremity  E.  Physician's following patient: Consultants following patients: Orthopedics    Jack Randle is on medicine service with Ortho consultation  F. PT/OT/ Intervention/ Consults:    PT/OT : [x]PT / [x] OT ordered // Weight Bearing Status: Nonweightbearing right lower extremity    DC disposition: TBD  G. I will for home health care orders: Remove her pin care instructions delineated carefully in the nursing section. H.  Pin care:      EXTERNAL FIXATION CARE INSTRUCTIONS     PIN CARE SITE CARE    FREQUENCY: EVERY OTHER DAY      1. Closed fracture of distal end of right tibia, unspecified fracture morphology, initial encounter    2. Syncope and collapse    3. Closed fracture of proximal end of right fibula, unspecified fracture morphology, initial encounter        FRACTURES    1. Please wash your hands  2. Place sterile gloves on  3.   Clean pin (VOLTAREN) 1 % GEL APPLY TWO GRAMS TOPICALLY TO AFFECTED AREA(S) THREE TIMES A DAY 1/29/21   Ar Automatic Reconciliation   gabapentin (NEURONTIN) 100 MG capsule Take 1 capsule by mouth 3 times daily. Ar Automatic Reconciliation   oxyCODONE (ROXICODONE) 5 MG immediate release tablet Take 10 mg by mouth every 4 hours as needed.  9/4/15   Ar Automatic Reconciliation     Current Facility-Administered Medications   Medication Dose Route Frequency    sodium chloride flush 0.9 % injection 5-40 mL  5-40 mL IntraVENous 2 times per day    sodium chloride flush 0.9 % injection 5-40 mL  5-40 mL IntraVENous PRN    enoxaparin (LOVENOX) injection 40 mg  40 mg SubCUTAneous Daily    polyethylene glycol (GLYCOLAX) packet 17 g  17 g Oral Daily PRN    bisacodyl (DULCOLAX) suppository 10 mg  10 mg Rectal Daily PRN    acetaminophen (TYLENOL) tablet 650 mg  650 mg Oral Q6H PRN    Or    acetaminophen (TYLENOL) suppository 650 mg  650 mg Rectal Q6H PRN    morphine (PF) injection 2 mg  2 mg IntraVENous Q4H PRN    atorvastatin (LIPITOR) tablet 80 mg  80 mg Oral Nightly    gabapentin (NEURONTIN) capsule 100 mg  100 mg Oral TID    gabapentin (NEURONTIN) capsule 300 mg  300 mg Oral BID    metoprolol succinate (TOPROL XL) extended release tablet 100 mg  100 mg Oral Daily    Teriflunomide TABS 14 mg (Patient Supplied)  14 mg Oral Daily    sodium chloride flush 0.9 % injection 5-40 mL  5-40 mL IntraVENous 2 times per day    sodium chloride flush 0.9 % injection 5-40 mL  5-40 mL IntraVENous PRN    ondansetron (ZOFRAN-ODT) disintegrating tablet 4 mg  4 mg Oral Q8H PRN    Or    ondansetron (ZOFRAN) injection 4 mg  4 mg IntraVENous Q6H PRN     No Known Allergies            Beck Coronel MD  7/20/2023  1:25 PM

## 2023-07-20 NOTE — PROGRESS NOTES
[facility] Hospitalist Group  Progress Note    Patient: Amna Guy Age: 58 y.o. : 1960 MR#: [id number] SSN: [id number]  Date/Time: 2023     Subjective:     Patient seen and evaluated, sitting up in bed, no acute distress. Patient is status post surgery to right tibia and fibula for closed fracture. Patient has external fixators in place. Pt is being evaluated by ARU or SNF     - patient's  is okay with her going to ARU. Authorization started by ARU, will continue to follow    -continue PT OT, await input from rehab regarding insurance authorization. Continue current treatment plan. Discussed with  at bedside. 7/15-continue current treatment plan, awaiting insurance authorization for patient to go to acute rehab facility. -continue current treatment plan, [id number] advised by insurance company. We will follow. Continue current treatment plan     -continue current treatment plan. Anticipate discharge to acute rehab if accepted. -continue current treatment plan. Ortho note reviewed. Peer to peer was denied. Insurance company mentioned that we can appeal.  I have given the information to the . Discussed with  at bedside. -continue current treatment plan. Case management on board for discharge planning    -continue current treatment plan, case management board for discharge planning. Staged ORIF  per Ortho. Assessment/Plan:     Right leg acute tib-fib fracture status post closed fixation and external fixators in place. Per Ortho patient can be discharged to skilled nursing facility. [id number] completed. History of cervical stenosis with radiculopathy-continue gabapentin. History of MS-continue Aubagio  History of hypertension-continue metoprolol, continue to monitor. History of TBI with memory loss.   DVT prophylaxis-Lovenox  Full code    Discussed with  at

## 2023-07-20 NOTE — PLAN OF CARE
Problem: Occupational Therapy - Adult  Goal: By Discharge: Performs self-care activities at highest level of function for planned discharge setting. See evaluation for individualized goals. Description: Occupational Therapy Goals:  Initiated 7/12/2023 to be met within 7-10 days, re-evaluation 7/19/2023 - pt making slow progress towards goals d/t impaired cognition, goal #4 modified, continue OT plan of care    1. Patient will perform bed mobility in preparation for ADLs with supervision/set-up. 2.  Patient will perform upper body dressing with modified independence. 3.  Patient will perform functional activity/grooming task sitting EOB with supervision/set-up, G balance. 4.  Patient will perform bedside commode transfers with minimal assistance/contact guard assist maintaining NWB using RW. 5.  Patient will perform all aspects of toileting with minimal assistance/contact guard assist.  6.  Patient will participate in upper extremity therapeutic exercise/activities with supervision/set-up for 8-10 minutes to increase ROM/strength for ADLs. 7.  Patient will utilize energy conservation techniques during functional activities with verbal cues. PLOF: mPatient a poor historian. Per chart review, pt received assist for ADLs.    Outcome: Progressing   OCCUPATIONAL THERAPY TREATMENT    Patient: Gene Beltran (60 y.o. 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

## 2023-07-21 LAB
ANION GAP SERPL CALC-SCNC: 4 MMOL/L (ref 3–18)
BUN SERPL-MCNC: 14 MG/DL (ref 7–18)
BUN/CREAT SERPL: 26 (ref 12–20)
CALCIUM SERPL-MCNC: 9.1 MG/DL (ref 8.5–10.1)
CHLORIDE SERPL-SCNC: 110 MMOL/L (ref 100–111)
CO2 SERPL-SCNC: 27 MMOL/L (ref 21–32)
CREAT SERPL-MCNC: 0.53 MG/DL (ref 0.6–1.3)
ERYTHROCYTE [DISTWIDTH] IN BLOOD BY AUTOMATED COUNT: 15.9 % (ref 11.6–14.5)
GLUCOSE SERPL-MCNC: 105 MG/DL (ref 74–99)
HCT VFR BLD AUTO: 34.7 % (ref 35–45)
HGB BLD-MCNC: 10.7 G/DL (ref 12–16)
MCH RBC QN AUTO: 22.3 PG (ref 24–34)
MCHC RBC AUTO-ENTMCNC: 30.8 G/DL (ref 31–37)
MCV RBC AUTO: 72.4 FL (ref 78–100)
NRBC # BLD: 0 K/UL (ref 0–0.01)
NRBC BLD-RTO: 0 PER 100 WBC
PLATELET # BLD AUTO: 256 K/UL (ref 135–420)
PMV BLD AUTO: 11.8 FL (ref 9.2–11.8)
POTASSIUM SERPL-SCNC: 3.8 MMOL/L (ref 3.5–5.5)
RBC # BLD AUTO: 4.79 M/UL (ref 4.2–5.3)
SODIUM SERPL-SCNC: 141 MMOL/L (ref 136–145)
WBC # BLD AUTO: 7.8 K/UL (ref 4.6–13.2)

## 2023-07-21 PROCEDURE — 99232 SBSQ HOSP IP/OBS MODERATE 35: CPT | Performed by: HOSPITALIST

## 2023-07-21 PROCEDURE — 2580000003 HC RX 258: Performed by: PHYSICIAN ASSISTANT

## 2023-07-21 PROCEDURE — 97535 SELF CARE MNGMENT TRAINING: CPT

## 2023-07-21 PROCEDURE — 85027 COMPLETE CBC AUTOMATED: CPT

## 2023-07-21 PROCEDURE — 80048 BASIC METABOLIC PNL TOTAL CA: CPT

## 2023-07-21 PROCEDURE — 6360000002 HC RX W HCPCS: Performed by: PHYSICIAN ASSISTANT

## 2023-07-21 PROCEDURE — 94761 N-INVAS EAR/PLS OXIMETRY MLT: CPT

## 2023-07-21 PROCEDURE — 36415 COLL VENOUS BLD VENIPUNCTURE: CPT

## 2023-07-21 PROCEDURE — 1100000000 HC RM PRIVATE

## 2023-07-21 PROCEDURE — 2580000003 HC RX 258: Performed by: ORTHOPAEDIC SURGERY

## 2023-07-21 PROCEDURE — 6370000000 HC RX 637 (ALT 250 FOR IP): Performed by: PHYSICIAN ASSISTANT

## 2023-07-21 RX ADMIN — GABAPENTIN 100 MG: 100 CAPSULE ORAL at 08:13

## 2023-07-21 RX ADMIN — GABAPENTIN 100 MG: 100 CAPSULE ORAL at 21:38

## 2023-07-21 RX ADMIN — ATORVASTATIN CALCIUM 80 MG: 40 TABLET, FILM COATED ORAL at 21:38

## 2023-07-21 RX ADMIN — TERIFLUNOMIDE 14 MG: 14 TABLET, FILM COATED ORAL at 08:12

## 2023-07-21 RX ADMIN — GABAPENTIN 300 MG: 100 CAPSULE ORAL at 21:39

## 2023-07-21 RX ADMIN — SODIUM CHLORIDE, PRESERVATIVE FREE 5 ML: 5 INJECTION INTRAVENOUS at 08:13

## 2023-07-21 RX ADMIN — GABAPENTIN 300 MG: 100 CAPSULE ORAL at 08:11

## 2023-07-21 RX ADMIN — SODIUM CHLORIDE, PRESERVATIVE FREE 5 ML: 5 INJECTION INTRAVENOUS at 21:42

## 2023-07-21 RX ADMIN — GABAPENTIN 100 MG: 100 CAPSULE ORAL at 13:26

## 2023-07-21 RX ADMIN — ENOXAPARIN SODIUM 40 MG: 100 INJECTION SUBCUTANEOUS at 08:12

## 2023-07-21 RX ADMIN — METOPROLOL SUCCINATE 100 MG: 100 TABLET, EXTENDED RELEASE ORAL at 08:11

## 2023-07-21 ASSESSMENT — PAIN SCALES - GENERAL
PAINLEVEL_OUTOF10: 1
PAINLEVEL_OUTOF10: 0
PAINLEVEL_OUTOF10: 0

## 2023-07-21 NOTE — PROGRESS NOTES
[facility]      She was seen this AM    Alert, has TBI, (I reminded her that she will need more surgery)   Pin sites look good  Foot in ext fix Frame  Swelling decreasing nicely    Plan for ORIF on 7/26.       Flor

## 2023-07-21 NOTE — PLAN OF CARE
Problem: Discharge Planning  Goal: Discharge to home or other facility with appropriate resources  7/20/2023 0901 by Inocencia Pedraza RN  Outcome: Progressing  7/20/2023 1825 by Arsenio Bach RN  Outcome: Progressing  Flowsheets (Taken 7/20/2023 6799)  Discharge to home or other facility with appropriate resources: Identify barriers to discharge with patient and caregiver     Problem: Safety - Adult  Goal: Free from fall injury  7/20/2023 5262 by Inocencia Pedraza RN  Outcome: Progressing  7/20/2023 1825 by Arsenio Bach RN  Outcome: Progressing     Problem: ABCDS Injury Assessment  Goal: Absence of physical injury  7/20/2023 2132 by Inocencia Pedraza RN  Outcome: Progressing  7/20/2023 1825 by Arsenio Bach RN  Outcome: Progressing     Problem: Skin/Tissue Integrity  Goal: Absence of new skin breakdown  Description: 1. Monitor for areas of redness and/or skin breakdown  2. Assess vascular access sites hourly  3. Every 4-6 hours minimum:  Change oxygen saturation probe site  4. Every 4-6 hours:  If on nasal continuous positive airway pressure, respiratory therapy assess nares and determine need for appliance change or resting period. 7/20/2023 7669 by Inocencia Pedraza RN  Outcome: Progressing  7/20/2023 1825 by Arsenio Bach RN  Outcome: Progressing     Problem: Occupational Therapy - Adult  Goal: By Discharge: Performs self-care activities at highest level of function for planned discharge setting. See evaluation for individualized goals. Description: Occupational Therapy Goals:  Initiated 7/12/2023 to be met within 7-10 days, re-evaluation 7/19/2023 - pt making slow progress towards goals d/t impaired cognition, goal #4 modified, continue OT plan of care    1. Patient will perform bed mobility in preparation for ADLs with supervision/set-up. 2.  Patient will perform upper body dressing with modified independence.   3.  Patient will perform functional activity/grooming task sitting EOB [place] high risk fall precautions, as indicated  3. Provide frequent short contacts to provide reality reorientation, refocusing and direction  4. Decrease environmental stimuli, including noise as appropriate  5. Monitor and intervene to maintain adequate nutrition, hydration, elimination, sleep and activity  6. If unable to ensure safety without constant attention obtain sitter and review sitter guidelines with assigned personnel  7.  Initiate Psychosocial CNS and Spiritual Care consult, as indicated  7/20/2023 2095 by Moi Ambrocio RN  Outcome: Progressing  7/20/2023 1825 by Chilo Polanco RN  Outcome: Progressing  Flowsheets (Taken 7/20/2023 1764)  Effect of thought disturbance (confusion, delirium, dementia, or psychosis) are managed with adequate functional status: Assess for contributors to thought disturbance, including medications, impaired vision or hearing, underlying metabolic abnormalities, dehydration, psychiatric diagnoses, notify LIP     Problem: Nutrition Deficit:  Goal: Optimize nutritional status  7/20/2023 4942 by Moi Ambrocio RN  Outcome: Progressing  7/20/2023 1825 by Chilo Polanco RN  Outcome: Progressing

## 2023-07-21 NOTE — PLAN OF CARE
Problem: Discharge Planning  Goal: Discharge to home or other facility with appropriate resources  Outcome: Progressing     Problem: Safety - Adult  Goal: Free from fall injury  Outcome: Progressing     Problem: ABCDS Injury Assessment  Goal: Absence of physical injury  Outcome: Progressing     Problem: Skin/Tissue Integrity  Goal: Absence of new skin breakdown  Description: 1. Monitor for areas of redness and/or skin breakdown  2. Assess vascular access sites hourly  3. Every 4-6 hours minimum:  Change oxygen saturation probe site  4. Every 4-6 hours:  If on nasal continuous positive airway pressure, respiratory therapy assess nares and determine need for appliance change or resting period. Outcome: Progressing     Problem: Occupational Therapy - Adult  Goal: By Discharge: Performs self-care activities at highest level of function for planned discharge setting. See evaluation for individualized goals. Description: Occupational Therapy Goals:  Initiated 7/12/2023 to be met within 7-10 days, re-evaluation 7/19/2023 - pt making slow progress towards goals d/t impaired cognition, goal #4 modified, continue OT plan of care    1. Patient will perform bed mobility in preparation for ADLs with supervision/set-up. 2.  Patient will perform upper body dressing with modified independence. 3.  Patient will perform functional activity/grooming task sitting EOB with supervision/set-up, G balance. 4.  Patient will perform bedside commode transfers with minimal assistance/contact guard assist maintaining NWB using RW. 5.  Patient will perform all aspects of toileting with minimal assistance/contact guard assist.  6.  Patient will participate in upper extremity therapeutic exercise/activities with supervision/set-up for 8-10 minutes to increase ROM/strength for ADLs. 7.  Patient will utilize energy conservation techniques during functional activities with verbal cues. PLOF: mPatient a poor historian.  Per

## 2023-07-21 NOTE — PLAN OF CARE
Problem: Occupational Therapy - Adult  Goal: By Discharge: Performs self-care activities at highest level of function for planned discharge setting. See evaluation for individualized goals. Description: Occupational Therapy Goals:  Initiated 7/12/2023 to be met within 7-10 days, re-evaluation 7/19/2023 - pt making slow progress towards goals d/t impaired cognition, goal #4 modified, continue OT plan of care    1. Patient will perform bed mobility in preparation for ADLs with supervision/set-up. 2.  Patient will perform upper body dressing with modified independence. 3.  Patient will perform functional activity/grooming task sitting EOB with supervision/set-up, G balance. 4.  Patient will perform bedside commode transfers with minimal assistance/contact guard assist maintaining NWB using RW. 5.  Patient will perform all aspects of toileting with minimal assistance/contact guard assist.  6.  Patient will participate in upper extremity therapeutic exercise/activities with supervision/set-up for 8-10 minutes to increase ROM/strength for ADLs. 7.  Patient will utilize energy conservation techniques during functional activities with verbal cues. PLOF: mPatient a poor historian. Per chart review, pt received assist for ADLs.    Outcome: Progressing  OCCUPATIONAL THERAPY TREATMENT    Patient: Nicko Storey (30 y.o. 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

## 2023-07-21 NOTE — PROGRESS NOTES
[facility] Hospitalist Group  Progress Note    Patient: Nilo King Age: 58 y.o. : 1960 MR#: [id number] SSN: [id number]  Date/Time: 2023     Subjective:     Patient seen and evaluated, sitting up in bed, no acute distress. Patient is status post surgery to right tibia and fibula for closed fracture. Patient has external fixators in place. Pt is being evaluated by ARU or SNF     - patient's  is okay with her going to ARU. Authorization started by ARU, will continue to follow    -continue PT OT, await input from rehab regarding insurance authorization. Continue current treatment plan. Discussed with  at bedside. 7/15-continue current treatment plan, awaiting insurance authorization for patient to go to acute rehab facility. -continue current treatment plan, [id number] advised by insurance company. We will follow. Continue current treatment plan     -continue current treatment plan. Anticipate discharge to acute rehab if accepted. -continue current treatment plan. Ortho note reviewed. Peer to peer was denied. Insurance company mentioned that we can appeal.  I have given the information to the . Discussed with  at bedside. -continue current treatment plan. Case management on board for discharge planning    -continue current treatment plan, case management board for discharge planning. Staged ORIF  per Ortho.    -continue current treatment plan. Staged ORIF . We will follow. Patient will likely not be discharged to skilled nursing facility since she requires authorization and the earliest possible discharge will be on  to return for surgery on . We will continue to monitor. Assessment/Plan:     Right leg acute tib-fib fracture status post closed fixation and external fixators in place. Per Ortho patient can be discharged to skilled nursing facility. gabapentin (NEURONTIN) capsule 300 mg  300 mg Oral BID    metoprolol succinate (TOPROL XL) extended release tablet 100 mg  100 mg Oral Daily    Teriflunomide TABS 14 mg (Patient Supplied)  14 mg Oral Daily    sodium chloride flush 0.9 % injection 5-40 mL  5-40 mL IntraVENous 2 times per day    sodium chloride flush 0.9 % injection 5-40 mL  5-40 mL IntraVENous PRN    ondansetron (ZOFRAN-ODT) disintegrating tablet 4 mg  4 mg Oral Q8H PRN    Or    ondansetron (ZOFRAN) injection 4 mg  4 mg IntraVENous Q6H PRN       Imaging:   XR TIBIA FIBULA RIGHT (2 VIEWS)    Result Date: 7/11/2023  Acute tibial and fibular fractures. Please see report for additional details. XR ANKLE RIGHT (2 VIEWS)    Result Date: 7/12/2023  Findings/impression: Intraoperative fluoroscopic images used for surgical guidance. They are nondiagnostic. Please see operative report for full details. XR ANKLE RIGHT (MIN 3 VIEWS)    Result Date: 7/11/2023  Acute fracture of the tibial shaft. Question acute fracture of the medial malleolus. Potential acute fracture of the lateral malleolus. Correlate with point tenderness. Small ossific density at the talonavicular articulation presumed degenerative. Correlate with point tenderness. Associated extensive soft tissue edema. Please see report for additional details. CT ANKLE RIGHT WO CONTRAST    Result Date: 7/11/2023  :  Oblique fracture distal tibia. Tiny ossific density inferior to medial malleolus that could be fracture fragment from the tip of the malleolus. No donor site seen. Lesion inferior to the lateral malleolus probably related to old injury, less likely acute avulsion. Small joint effusion. Soft tissue swelling around the ankle. XR CHEST PORTABLE    Result Date: 7/11/2023  No acute findings.        Labs:    Recent Results (from the past 48 hour(s))   CBC    Collection Time: 07/20/23  3:41 AM   Result Value Ref Range    WBC 7.0 4.6 - 13.2 K/uL    RBC 4.62 4.20 - 5.30 M/uL    Hemoglobin

## 2023-07-21 NOTE — CARE COORDINATION
Lorrie- No beds available    [facility]- left message with admissions. There are no other acceptances at this time. Uploaded additional progress notes, PT OT notes in careport for review. HARMONY met with  and patient for additional SNF choices. [facility] and [address] [place] [address]     Referrals sent. HARMONY also spoke with Angelica at [facility] to review pt. Angelica agreed and will follow up.      MATT Lepe  Care Management

## 2023-07-22 PROCEDURE — 2580000003 HC RX 258: Performed by: PHYSICIAN ASSISTANT

## 2023-07-22 PROCEDURE — 2580000003 HC RX 258: Performed by: ORTHOPAEDIC SURGERY

## 2023-07-22 PROCEDURE — 1100000000 HC RM PRIVATE

## 2023-07-22 PROCEDURE — 99232 SBSQ HOSP IP/OBS MODERATE 35: CPT | Performed by: HOSPITALIST

## 2023-07-22 PROCEDURE — 94761 N-INVAS EAR/PLS OXIMETRY MLT: CPT

## 2023-07-22 PROCEDURE — 6360000002 HC RX W HCPCS: Performed by: PHYSICIAN ASSISTANT

## 2023-07-22 PROCEDURE — 6370000000 HC RX 637 (ALT 250 FOR IP): Performed by: PHYSICIAN ASSISTANT

## 2023-07-22 RX ADMIN — GABAPENTIN 100 MG: 100 CAPSULE ORAL at 16:03

## 2023-07-22 RX ADMIN — GABAPENTIN 300 MG: 100 CAPSULE ORAL at 21:00

## 2023-07-22 RX ADMIN — ATORVASTATIN CALCIUM 80 MG: 40 TABLET, FILM COATED ORAL at 21:00

## 2023-07-22 RX ADMIN — SODIUM CHLORIDE, PRESERVATIVE FREE 10 ML: 5 INJECTION INTRAVENOUS at 21:07

## 2023-07-22 RX ADMIN — TERIFLUNOMIDE 14 MG: 14 TABLET, FILM COATED ORAL at 10:09

## 2023-07-22 RX ADMIN — SODIUM CHLORIDE, PRESERVATIVE FREE 10 ML: 5 INJECTION INTRAVENOUS at 10:01

## 2023-07-22 RX ADMIN — GABAPENTIN 100 MG: 100 CAPSULE ORAL at 09:58

## 2023-07-22 RX ADMIN — ENOXAPARIN SODIUM 40 MG: 100 INJECTION SUBCUTANEOUS at 10:00

## 2023-07-22 RX ADMIN — GABAPENTIN 100 MG: 100 CAPSULE ORAL at 21:00

## 2023-07-22 RX ADMIN — GABAPENTIN 300 MG: 100 CAPSULE ORAL at 10:00

## 2023-07-22 RX ADMIN — METOPROLOL SUCCINATE 100 MG: 100 TABLET, EXTENDED RELEASE ORAL at 09:59

## 2023-07-22 ASSESSMENT — PAIN SCALES - GENERAL
PAINLEVEL_OUTOF10: 0

## 2023-07-22 NOTE — PROGRESS NOTES
[facility] Hospitalist Group  Progress Note    Patient: Xiao Griggs Age: 58 y.o. : 1960 MR#: [id number] SSN: [id number]  Date/Time: 2023     Subjective:     Patient seen and evaluated, sitting up in bed, no acute distress. Patient is status post surgery to right tibia and fibula for closed fracture. Patient has external fixators in place. Pt is being evaluated by ARU or SNF     - patient's  is okay with her going to ARU. Authorization started by ARU, will continue to follow    -continue PT OT, await input from rehab regarding insurance authorization. Continue current treatment plan. Discussed with  at bedside. 7/15-continue current treatment plan, awaiting insurance authorization for patient to go to acute rehab facility. -continue current treatment plan, [id number] advised by insurance company. We will follow. Continue current treatment plan     -continue current treatment plan. Anticipate discharge to acute rehab if accepted. -continue current treatment plan. Ortho note reviewed. Peer to peer was denied. Insurance company mentioned that we can appeal.  I have given the information to the . Discussed with  at bedside. -continue current treatment plan. Case management on board for discharge planning    -continue current treatment plan, case management board for discharge planning. Staged ORIF  per Ortho.    -continue current treatment plan. Staged ORIF . We will follow. Patient will likely not be discharged to skilled nursing facility since she requires authorization and the earliest possible discharge will be on  to return for surgery on . We will continue to monitor. -continue current treatment plan, staged ORIF . Assessment/Plan:     Right leg acute tib-fib fracture status post closed fixation and external fixators in place.   Per Ortho patient 100 mg  100 mg Oral TID    gabapentin (NEURONTIN) capsule 300 mg  300 mg Oral BID    metoprolol succinate (TOPROL XL) extended release tablet 100 mg  100 mg Oral Daily    Teriflunomide TABS 14 mg (Patient Supplied)  14 mg Oral Daily    sodium chloride flush 0.9 % injection 5-40 mL  5-40 mL IntraVENous 2 times per day    sodium chloride flush 0.9 % injection 5-40 mL  5-40 mL IntraVENous PRN    ondansetron (ZOFRAN-ODT) disintegrating tablet 4 mg  4 mg Oral Q8H PRN    Or    ondansetron (ZOFRAN) injection 4 mg  4 mg IntraVENous Q6H PRN       Imaging:   XR TIBIA FIBULA RIGHT (2 VIEWS)    Result Date: 7/11/2023  Acute tibial and fibular fractures. Please see report for additional details. XR ANKLE RIGHT (2 VIEWS)    Result Date: 7/12/2023  Findings/impression: Intraoperative fluoroscopic images used for surgical guidance. They are nondiagnostic. Please see operative report for full details. XR ANKLE RIGHT (MIN 3 VIEWS)    Result Date: 7/11/2023  Acute fracture of the tibial shaft. Question acute fracture of the medial malleolus. Potential acute fracture of the lateral malleolus. Correlate with point tenderness. Small ossific density at the talonavicular articulation presumed degenerative. Correlate with point tenderness. Associated extensive soft tissue edema. Please see report for additional details. CT ANKLE RIGHT WO CONTRAST    Result Date: 7/11/2023  :  Oblique fracture distal tibia. Tiny ossific density inferior to medial malleolus that could be fracture fragment from the tip of the malleolus. No donor site seen. Lesion inferior to the lateral malleolus probably related to old injury, less likely acute avulsion. Small joint effusion. Soft tissue swelling around the ankle. XR CHEST PORTABLE    Result Date: 7/11/2023  No acute findings.        Labs:    Recent Results (from the past 48 hour(s))   CBC    Collection Time: 07/21/23  2:28 AM   Result Value Ref Range    WBC 7.8 4.6 - 13.2 K/uL    RBC 4.79 4.20 -

## 2023-07-23 ENCOUNTER — PREP FOR PROCEDURE (OUTPATIENT)
Age: 63
End: 2023-07-23

## 2023-07-23 PROCEDURE — 6370000000 HC RX 637 (ALT 250 FOR IP): Performed by: PHYSICIAN ASSISTANT

## 2023-07-23 PROCEDURE — 6360000002 HC RX W HCPCS: Performed by: PHYSICIAN ASSISTANT

## 2023-07-23 PROCEDURE — 94761 N-INVAS EAR/PLS OXIMETRY MLT: CPT

## 2023-07-23 PROCEDURE — 2580000003 HC RX 258: Performed by: ORTHOPAEDIC SURGERY

## 2023-07-23 PROCEDURE — 99232 SBSQ HOSP IP/OBS MODERATE 35: CPT | Performed by: HOSPITALIST

## 2023-07-23 PROCEDURE — 1100000000 HC RM PRIVATE

## 2023-07-23 PROCEDURE — 2580000003 HC RX 258: Performed by: PHYSICIAN ASSISTANT

## 2023-07-23 RX ADMIN — METOPROLOL SUCCINATE 100 MG: 100 TABLET, EXTENDED RELEASE ORAL at 09:34

## 2023-07-23 RX ADMIN — GABAPENTIN 300 MG: 100 CAPSULE ORAL at 09:34

## 2023-07-23 RX ADMIN — GABAPENTIN 100 MG: 100 CAPSULE ORAL at 21:03

## 2023-07-23 RX ADMIN — SODIUM CHLORIDE, PRESERVATIVE FREE 10 ML: 5 INJECTION INTRAVENOUS at 21:04

## 2023-07-23 RX ADMIN — GABAPENTIN 300 MG: 100 CAPSULE ORAL at 21:03

## 2023-07-23 RX ADMIN — GABAPENTIN 100 MG: 100 CAPSULE ORAL at 09:34

## 2023-07-23 RX ADMIN — ENOXAPARIN SODIUM 40 MG: 100 INJECTION SUBCUTANEOUS at 09:34

## 2023-07-23 RX ADMIN — ATORVASTATIN CALCIUM 80 MG: 40 TABLET, FILM COATED ORAL at 21:03

## 2023-07-23 RX ADMIN — SODIUM CHLORIDE, PRESERVATIVE FREE 10 ML: 5 INJECTION INTRAVENOUS at 09:41

## 2023-07-23 RX ADMIN — GABAPENTIN 100 MG: 100 CAPSULE ORAL at 16:38

## 2023-07-23 RX ADMIN — TERIFLUNOMIDE 14 MG: 14 TABLET, FILM COATED ORAL at 09:40

## 2023-07-23 NOTE — PROGRESS NOTES
[facility] Hospitalist Group  Progress Note    Patient: Xiomara Becker Age: 58 y.o. : 1960 MR#: [id number] SSN: [id number]  Date/Time: 2023     Subjective:     Patient seen and evaluated, sitting up in bed, no acute distress. Patient is status post surgery to right tibia and fibula for closed fracture. Patient has external fixators in place. Pt is being evaluated by ARU or SNF     - patient's  is okay with her going to ARU. Authorization started by ARU, will continue to follow    -continue PT OT, await input from rehab regarding insurance authorization. Continue current treatment plan. Discussed with  at bedside. 7/15-continue current treatment plan, awaiting insurance authorization for patient to go to acute rehab facility. -continue current treatment plan, [id number] advised by insurance company. We will follow. Continue current treatment plan     -continue current treatment plan. Anticipate discharge to acute rehab if accepted. -continue current treatment plan. Ortho note reviewed. Peer to peer was denied. Insurance company mentioned that we can appeal.  I have given the information to the . Discussed with  at bedside. -continue current treatment plan. Case management on board for discharge planning    -continue current treatment plan, case management board for discharge planning. Staged ORIF  per Ortho.    -continue current treatment plan. Staged ORIF . We will follow. Patient will likely not be discharged to skilled nursing facility since she requires authorization and the earliest possible discharge will be on  to return for surgery on . We will continue to monitor. -continue current treatment plan, staged ORIF .    -continue current treatment plan, staged ORIF .       Assessment/Plan:     Right leg acute tib-fib fracture status post closed

## 2023-07-23 NOTE — PROGRESS NOTES
Nurse notified that patient cannot find her mouth guard. Patient had mouth guard 07/22/2023 in the am, when she brushed her teeth. Nurse asked patient do she remember siting it on the meal tray or table. Patient stated she cant remember. Patient stated she will notify her  who will contact her dentist for replacement.

## 2023-07-23 NOTE — H&P
ORTHOPAEDIC EVALUATION      Patient: Uri Duncan                   MRN: [id number]         SSN: [id number]  YOB: 1960            AGE: 58 y.o. SEX: female    Patient scheduled for:    REMOVE LARGE EXTERNAL FIXATION, OPEN REDUCTION INTERNAL FIXATION RIGHT TIBIA AND FIBULA, POSSIBLE BONE GRAFTING  Date of surgery: 7/26/2023   Surgical Time: 60 min  Consults: Hospitality team admission  Special Equipment: Synthes large external fixator system, Synthes tibia/fibula plate  Location of Surgery: [facility]  Surgeon: Ping Ray MD  ANESTHESIA TYPE:  General          ASSESSMENT/PLAN      Staged ORIF right TIBIA/FIBULA/fibula, remove external fixation system         HISTORY AND INFORMED CONSENT        The patient is a pleasant 58 y.o. female who has a history of recent right distal tibia and fibula fractures, that occurred on July 11, 2023. She was at home, lost her balance in her home, and sustained injury to her right lower extremity. She does have multiple sclerosis. She has h/o TBI and forgetfulness. I met with her  and he understands staged ORIF. Patient has failed the following conservative measures: End of care, stabilization of this fracture, because of her swelling plan is for temporizing external fixator, with definitive fixation, once her soft tissues are more amenable. Patient's pain rating: Pain right distal tibia      Due to the current findings, affected activity of daily living and continued pain and discomfort, surgical intervention is indicated.  The alternatives, risks, and complications, including but not limited to infection, blood loss, need for blood transfusion, neurovascular damage, breonna-incisional numbness, subcutaneous hematoma, bone fracture, anesthetic complications, DVT, PE, death, RSD, postoperative stiffness and pain, possible surgical scar, delayed healing and nonhealing, reflexive sympathetic dystrophy, damage to blood

## 2023-07-24 PROCEDURE — 97535 SELF CARE MNGMENT TRAINING: CPT

## 2023-07-24 PROCEDURE — 1100000000 HC RM PRIVATE

## 2023-07-24 PROCEDURE — 99232 SBSQ HOSP IP/OBS MODERATE 35: CPT | Performed by: STUDENT IN AN ORGANIZED HEALTH CARE EDUCATION/TRAINING PROGRAM

## 2023-07-24 PROCEDURE — 6370000000 HC RX 637 (ALT 250 FOR IP): Performed by: INTERNAL MEDICINE

## 2023-07-24 PROCEDURE — 97530 THERAPEUTIC ACTIVITIES: CPT

## 2023-07-24 PROCEDURE — 2580000003 HC RX 258: Performed by: ORTHOPAEDIC SURGERY

## 2023-07-24 PROCEDURE — 97164 PT RE-EVAL EST PLAN CARE: CPT

## 2023-07-24 PROCEDURE — 6370000000 HC RX 637 (ALT 250 FOR IP): Performed by: PHYSICIAN ASSISTANT

## 2023-07-24 PROCEDURE — 6360000002 HC RX W HCPCS: Performed by: PHYSICIAN ASSISTANT

## 2023-07-24 PROCEDURE — 94761 N-INVAS EAR/PLS OXIMETRY MLT: CPT

## 2023-07-24 PROCEDURE — 99024 POSTOP FOLLOW-UP VISIT: CPT | Performed by: ORTHOPAEDIC SURGERY

## 2023-07-24 RX ORDER — GABAPENTIN 100 MG/1
200 CAPSULE ORAL ONCE
Status: COMPLETED | OUTPATIENT
Start: 2023-07-24 | End: 2023-07-24

## 2023-07-24 RX ADMIN — GABAPENTIN 200 MG: 100 CAPSULE ORAL at 21:51

## 2023-07-24 RX ADMIN — METOPROLOL SUCCINATE 100 MG: 100 TABLET, EXTENDED RELEASE ORAL at 09:00

## 2023-07-24 RX ADMIN — ATORVASTATIN CALCIUM 80 MG: 40 TABLET, FILM COATED ORAL at 21:51

## 2023-07-24 RX ADMIN — GABAPENTIN 100 MG: 100 CAPSULE ORAL at 13:05

## 2023-07-24 RX ADMIN — GABAPENTIN 100 MG: 100 CAPSULE ORAL at 08:59

## 2023-07-24 RX ADMIN — TERIFLUNOMIDE 14 MG: 14 TABLET, FILM COATED ORAL at 16:22

## 2023-07-24 RX ADMIN — ENOXAPARIN SODIUM 40 MG: 100 INJECTION SUBCUTANEOUS at 09:00

## 2023-07-24 RX ADMIN — GABAPENTIN 300 MG: 100 CAPSULE ORAL at 09:01

## 2023-07-24 RX ADMIN — SODIUM CHLORIDE, PRESERVATIVE FREE 10 ML: 5 INJECTION INTRAVENOUS at 21:52

## 2023-07-24 ASSESSMENT — PAIN SCALES - GENERAL: PAINLEVEL_OUTOF10: 0

## 2023-07-24 NOTE — PROGRESS NOTES
[facility] Hospitalist Group  Progress Note    Patient: Xiao Griggs Age: 58 y.o. : 1960 MR#: [id number]: [id number]  Date/Time: 2023     Subjective:     Patient seen and evaluated, sitting up in bed, no acute distress. Patient is status post surgery to right tibia and fibula for closed fracture. Patient has external fixators in place. Pt is being evaluated by ARU or SNF     - patient's  is okay with her going to ARU. Authorization started by ARU, will continue to follow    -continue PT OT, await input from rehab regarding insurance authorization. Continue current treatment plan. Discussed with  at bedside. 7/15-continue current treatment plan, awaiting insurance authorization for patient to go to acute rehab facility. -continue current treatment plan, [id number] advised by insurance company. We will follow. Continue current treatment plan     -continue current treatment plan. Anticipate discharge to acute rehab if accepted. -continue current treatment plan. Ortho note reviewed. Peer to peer was denied. Insurance company mentioned that we can appeal.  I have given the information to the . Discussed with  at bedside. -continue current treatment plan. Case management on board for discharge planning    -continue current treatment plan, case management board for discharge planning. Staged ORIF  per Ortho.    -continue current treatment plan. Staged ORIF . We will follow. Patient will likely not be discharged to skilled nursing facility since she requires authorization and the earliest possible discharge will be on  to return for surgery on . We will continue to monitor. -continue current treatment plan, staged ORIF .    -continue current treatment plan, staged ORIF .     - continue current plan. Staged ORIF on .  Patient upset as her injection 2 mg  2 mg IntraVENous Q4H PRN    atorvastatin (LIPITOR) tablet 80 mg  80 mg Oral Nightly    gabapentin (NEURONTIN) capsule 100 mg  100 mg Oral TID    gabapentin (NEURONTIN) capsule 300 mg  300 mg Oral BID    metoprolol succinate (TOPROL XL) extended release tablet 100 mg  100 mg Oral Daily    Teriflunomide TABS 14 mg (Patient Supplied)  14 mg Oral Daily    sodium chloride flush 0.9 % injection 5-40 mL  5-40 mL IntraVENous 2 times per day    sodium chloride flush 0.9 % injection 5-40 mL  5-40 mL IntraVENous PRN    ondansetron (ZOFRAN-ODT) disintegrating tablet 4 mg  4 mg Oral Q8H PRN    Or    ondansetron (ZOFRAN) injection 4 mg  4 mg IntraVENous Q6H PRN       Imaging:   XR TIBIA FIBULA RIGHT (2 VIEWS)    Result Date: 7/11/2023  Acute tibial and fibular fractures. Please see report for additional details. XR ANKLE RIGHT (2 VIEWS)    Result Date: 7/12/2023  Findings/impression: Intraoperative fluoroscopic images used for surgical guidance. They are nondiagnostic. Please see operative report for full details. XR ANKLE RIGHT (MIN 3 VIEWS)    Result Date: 7/11/2023  Acute fracture of the tibial shaft. Question acute fracture of the medial malleolus. Potential acute fracture of the lateral malleolus. Correlate with point tenderness. Small ossific density at the talonavicular articulation presumed degenerative. Correlate with point tenderness. Associated extensive soft tissue edema. Please see report for additional details. CT ANKLE RIGHT WO CONTRAST    Result Date: 7/11/2023  :  Oblique fracture distal tibia. Tiny ossific density inferior to medial malleolus that could be fracture fragment from the tip of the malleolus. No donor site seen. Lesion inferior to the lateral malleolus probably related to old injury, less likely acute avulsion. Small joint effusion. Soft tissue swelling around the ankle. XR CHEST PORTABLE    Result Date: 7/11/2023  No acute findings.        Labs:    No results found for

## 2023-07-24 NOTE — PLAN OF CARE
Problem: Occupational Therapy - Adult  Goal: By Discharge: Performs self-care activities at highest level of function for planned discharge setting. See evaluation for individualized goals. Description: Occupational Therapy Goals:  Initiated 7/12/2023 to be met within 7-10 days, re-evaluation 7/19/2023 - pt making slow progress towards goals d/t impaired cognition, goal #4 modified, continue OT plan of care    1. Patient will perform bed mobility in preparation for ADLs with supervision/set-up. 2.  Patient will perform upper body dressing with modified independence. 3.  Patient will perform functional activity/grooming task sitting EOB with supervision/set-up, G balance. 4.  Patient will perform bedside commode transfers with minimal assistance/contact guard assist maintaining NWB using RW. 5.  Patient will perform all aspects of toileting with minimal assistance/contact guard assist.  6.  Patient will participate in upper extremity therapeutic exercise/activities with supervision/set-up for 8-10 minutes to increase ROM/strength for ADLs. 7.  Patient will utilize energy conservation techniques during functional activities with verbal cues. PLOF: mPatient a poor historian. Per chart review, pt received assist for ADLs.    Outcome: Progressing   OCCUPATIONAL THERAPY TREATMENT    Patient: Coit Fabry (72 y.o. 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 ASSESSMENT:  PT co tx to maximize pt safety and participation. Pt presented supine in bed upon entry, pleasantly confused, and agreeable for participation. Pt was assisted to EOB CGA in prep for functional tasks. Once sitting, she demo G balance and performed simple grooming tasks with S/U. Reviewed WB status on R LE. STS transfer MAX A with RW and assist w/holding R LE off floor. SPT to reclining chair MAX A, simulating BSC transfer. Pt was positioned for comfort and left with BLE's elevated, spouse present, and all needs left within reach. RN made aware. Encouraged pt to sit up ~1-2 hours for increased strength and endurance for ADL carryover. Progression toward goals:  []          Improving appropriately and progressing toward goals  [x]          Improving slowly and progressing toward goals  []          Not making progress toward goals and plan of care will be adjusted     PLAN:  Patient continues to benefit from skilled intervention to address the above impairments. Continue treatment per established plan of care. Further Equipment Recommendations for Discharge: RW, drop arm BSC    AMPA: AM-PAC Inpatient Daily Activity Raw Score: 15    Current research shows that an AM-PAC score of 17 or less is not associated with a discharge to the patient's home setting. Based on an AM-PAC score and their current ADL deficits; it is recommended that the patient have 3-5 sessions per week of Occupational Therapy at d/c to increase the patient's independence. This AMPAC score should be considered in conjunction with interdisciplinary team recommendations to determine the most appropriate discharge setting.  Patient's social support, diagnosis, medical stability, and prior level of function should also be taken into

## 2023-07-24 NOTE — PLAN OF CARE
Problem: Discharge Planning  Goal: Discharge to home or other facility with appropriate resources  Outcome: Progressing  Flowsheets (Taken 7/23/2023 2100)  Discharge to home or other facility with appropriate resources: Identify barriers to discharge with patient and caregiver     Problem: Safety - Adult  Goal: Free from fall injury  Outcome: Progressing

## 2023-07-24 NOTE — PLAN OF CARE
Problem: Physical Therapy - Adult  Goal: By Discharge: Performs mobility at highest level of function for planned discharge setting. See evaluation for individualized goals. Description: Physical Therapy Goals:  Initiated 7/12/2023, re-evaluated 7/18/23, re-evaluated 7/24/23 and goals adjusted as needed to be met within 7-10 days. 1.  Patient will move from supine to sit and sit to supine , scoot up and down, and roll side to side in bed with Marta. 2.  Patient will transfer from bed to chair and chair to bed with minimal assistance/contact guard assist using the least restrictive device. 3.  Patient will perform sit to stand with Laura . 4. Patient will ambulate with modA for 3 feet with the least restrictive device. PLOF: Pt reports Marta with rollator, electric RW?, lives with  in 1 story house with ramp to enter. Outcome: Progressing   PHYSICAL THERAPY RE-EVALUATION    Patient: Nona Siddiqui (89 y.o. 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 [S82.301K]  Closed fracture of proximal end of right fibula, unspecified fracture morphology, initial encounter [S82.831A]  Closed fracture of distal end of right tibia, unspecified fracture morphology, initial encounter [S82.301A]  Procedure(s) (LRB):  CLOSED REDUCTION RIGHT TIBIA AND FIBIA; LARGE EXTERNAL FIXATION; SYNTHES; *NEEDS KICKSTAND* (Right) 13 Days Post-Op   Precautions: Weight Bearing, Fall Risk, Bed Alarm, Right Lower Extremity Weight Bearing: Non Weight Bearing    ASSESSMENT :  Pt cleared to participate in PT session, pt received semi-reclined in bed and agreeable to therapy session. Completing with OT to maximize safety and mobility.  Based on the objective data described below, the patient presents with decreased endurance, decreased strength, decreased balance reactions, gait deviations, confusion, and decreased independence in Continued education needed    Thank you for this referral.  Margaret Luna, PT  Minutes: 28

## 2023-07-25 ENCOUNTER — ANESTHESIA EVENT (OUTPATIENT)
Facility: HOSPITAL | Age: 63
End: 2023-07-25
Payer: MEDICARE

## 2023-07-25 PROCEDURE — 97110 THERAPEUTIC EXERCISES: CPT

## 2023-07-25 PROCEDURE — 6370000000 HC RX 637 (ALT 250 FOR IP): Performed by: PHYSICIAN ASSISTANT

## 2023-07-25 PROCEDURE — 99232 SBSQ HOSP IP/OBS MODERATE 35: CPT | Performed by: STUDENT IN AN ORGANIZED HEALTH CARE EDUCATION/TRAINING PROGRAM

## 2023-07-25 PROCEDURE — 36410 VNPNXR 3YR/> PHY/QHP DX/THER: CPT

## 2023-07-25 PROCEDURE — 97535 SELF CARE MNGMENT TRAINING: CPT

## 2023-07-25 PROCEDURE — 1100000000 HC RM PRIVATE

## 2023-07-25 PROCEDURE — 2580000003 HC RX 258: Performed by: ORTHOPAEDIC SURGERY

## 2023-07-25 RX ADMIN — METOPROLOL SUCCINATE 100 MG: 100 TABLET, EXTENDED RELEASE ORAL at 09:02

## 2023-07-25 RX ADMIN — GABAPENTIN 100 MG: 100 CAPSULE ORAL at 15:46

## 2023-07-25 RX ADMIN — ATORVASTATIN CALCIUM 80 MG: 40 TABLET, FILM COATED ORAL at 21:20

## 2023-07-25 RX ADMIN — TERIFLUNOMIDE 14 MG: 14 TABLET, FILM COATED ORAL at 09:09

## 2023-07-25 RX ADMIN — SODIUM CHLORIDE, PRESERVATIVE FREE 10 ML: 5 INJECTION INTRAVENOUS at 09:03

## 2023-07-25 RX ADMIN — GABAPENTIN 300 MG: 100 CAPSULE ORAL at 09:02

## 2023-07-25 RX ADMIN — GABAPENTIN 100 MG: 100 CAPSULE ORAL at 09:02

## 2023-07-25 RX ADMIN — GABAPENTIN 100 MG: 100 CAPSULE ORAL at 21:20

## 2023-07-25 RX ADMIN — GABAPENTIN 300 MG: 100 CAPSULE ORAL at 21:21

## 2023-07-25 ASSESSMENT — PAIN SCALES - GENERAL
PAINLEVEL_OUTOF10: 0
PAINLEVEL_OUTOF10: 0

## 2023-07-25 NOTE — OP NOTE
Operative Note      Patient: Barbara Leyva  YOB: 1960  MRN: [id number]    Date of Procedure: 7/26/2023    Pre-Op Diagnosis Codes:     * Closed fracture of right tibia and fibula, initial encounter [S82.201A, S82.401A]    Post-Op Diagnosis: Post-Op Diagnosis Codes:     * Closed fracture of right tibia and fibula, initial encounter [S82.201A, S82.401A]       Procedure(s):  REMOVAL OF RIGHT EXTERNAL FIXATION, RIGHT TIBIA/FIBULA OPEN REDUCTION INTERNAL FIXATION; POSSIBLE ALLOGRAFT BONE GRAFTING; REGIONAL NERVE BLOCK; SYNTHES TIBIA PLATES; VIVIGEN ALLOGRAFT; C-ARM, CLOSED TREATMENT RIGHT PROXIMAL FIBULA FRACTURE, PLACEMENT OF EXTERNAL BONE STIMULATION IN PACU    Surgeon(s):  MD Kenneth Thompson MD    Assistant:   Surgical Assistant: Varun Gilbert    Anesthesia: General and Regional Block  IV FLUIDS:  1000 ml  TOURNIQUET TIME:  67  minutes @ 300 mg HG. Estimated Blood Loss (mL): 25 ml EBL    Complications: None    Specimens:   * No specimens in log *    Implants:  Implant Name Type Inv.  Item Serial No.  Lot No. LRB No. Used Action   PLATE BNE [id number] 6 H NONSTERILE R ANTEROMEDIAL DST TIB S - [id number]  PLATE BNE [id number] 6 H NONSTERILE R ANTEROMEDIAL DST TIB S  DEPUY SYNTHES USA-WD NA Right 1 Implanted   SCREW BNE L45MM DIA3.5MM KADI S STL ST NONCANNULATED [place]ULIANA - [id number]  SCREW BNE L45MM DIA3.5MM KADI S STL ST NONCANNULATED YULIANA  DEPUY SYNTHES USA-WD NA Right 1 Implanted   LOCKING 38mm    SYNTHES INC_COV NA Right 5 Implanted   LOCKING 34mm    SYNTHES INC_COV NA Right 1 Implanted   LOCKING 36mm    SYNTHES INC_COV NA Right 1 Implanted   SCREW BNE L28MM DIA3.5MM KADI S STL ST FULL THRD T15 DRV - [id number]  SCREW BNE L28MM DIA3.5MM KADI S STL ST FULL THRD T15 DRV  DEPUY SYNTHES USA-WD NA Right 2 Implanted   SCREW BNE L26MM DIA3.5MM KADI S STL ST FULL THRD T15 - [id number]  SCREW BNE L26MM DIA3.5MM KADI S STL ST FULL THRD T15  DEPUY SYNTHES USA-WD NA Right 2 Implanted
up to the knee was fully first cleansed with chlorhexidine gluconate scrub, cleansing between her toes, cleansing her hindfoot, and the whole foot all the way up to beyond her knee and up the distal femur. Then, at this time, Rory Alvarez was formally prepped with chlorhexidine gluconate scrub, chlorhexidine gluconate yellow prep stick x 2. A formal time-out was conducted identifying correct patient, correct limb, correct location for surgery, confirmation that the patient did receive antibiotics, confirmation that my marked signature was visible. . As such, everyone agreed to our standard formal time-out. All members of the surgical team agreed to this formal time-out. As such, I elected to proceed. I first made a small incision, about an 8 mm incision, on the medial portion of the on right calcaneus at the tuberosity of the calcaneus posterior portion. I made an incision with a 15 blade, dissecting carefully down to bone. I then placed my centrally threaded Synthes 5.0 mm centrally threaded pin from the medial towards the lateral direction. As it approached the lateral direction I made a small incision laterally and then I passed my pin through the skin. I then, under fluoroscopy, located 2 areas along the proximal tibia well away from my planned surgical site so that these proposed half pins were well away from my proposed future surgical site. I identified under fluoroscopy the exact location of these pins. I made a less than 1 cm incision through skin using the appropriate Synthes guide and then carefully dissected through skin and subcutaneous directly down to bone and then placed two 5.0 mm Synthes Schanz pin half pins into the tibia position #5, position #1. A Kick stand was attached to external fixation device. I then constructed a delta frame and tightened everything up proximally, reduced the fracture as best I could, it was   near anatomic alignment in the AP and lateral views.  Then I tightened

## 2023-07-25 NOTE — PROGRESS NOTES
[address] [facility] Hospitalist Group  Progress Note    Patient: Jose Staley Age: 58 y.o. : 1960 MR#: [id number] SSN: [id number]  Date/Time: 2023     Subjective:     Patient seen and evaluated, sitting up in bed, no acute distress. Patient is status post surgery to right tibia and fibula for closed fracture. Patient has external fixators in place. Pt is being evaluated by ARU or SNF     - patient's  is okay with her going to ARU. Authorization started by ARU, will continue to follow    -continue PT OT, await input from rehab regarding insurance authorization. Continue current treatment plan. Discussed with  at bedside. 7/15-continue current treatment plan, awaiting insurance authorization for patient to go to acute rehab facility. -continue current treatment plan, [id number] advised by insurance company. We will follow. Continue current treatment plan     -continue current treatment plan. Anticipate discharge to acute rehab if accepted. -continue current treatment plan. Ortho note reviewed. Peer to peer was denied. Insurance company mentioned that we can appeal.  I have given the information to the . Discussed with  at bedside. -continue current treatment plan. Case management on board for discharge planning    -continue current treatment plan, case management board for discharge planning. Staged ORIF  per Ortho.    -continue current treatment plan. Staged ORIF . We will follow. Patient will likely not be discharged to skilled nursing facility since she requires authorization and the earliest possible discharge will be on  to return for surgery on . We will continue to monitor. -continue current treatment plan, staged ORIF .    -continue current treatment plan, staged ORIF .     - continue current plan. Staged ORIF on .  Patient upset as her mg Rectal Daily PRN    acetaminophen (TYLENOL) tablet 650 mg  650 mg Oral Q6H PRN    Or    acetaminophen (TYLENOL) suppository 650 mg  650 mg Rectal Q6H PRN    morphine (PF) injection 2 mg  2 mg IntraVENous Q4H PRN    atorvastatin (LIPITOR) tablet 80 mg  80 mg Oral Nightly    gabapentin (NEURONTIN) capsule 100 mg  100 mg Oral TID    gabapentin (NEURONTIN) capsule 300 mg  300 mg Oral BID    metoprolol succinate (TOPROL XL) extended release tablet 100 mg  100 mg Oral Daily    Teriflunomide TABS 14 mg (Patient Supplied)  14 mg Oral Daily    sodium chloride flush 0.9 % injection 5-40 mL  5-40 mL IntraVENous 2 times per day    sodium chloride flush 0.9 % injection 5-40 mL  5-40 mL IntraVENous PRN    ondansetron (ZOFRAN-ODT) disintegrating tablet 4 mg  4 mg Oral Q8H PRN    Or    ondansetron (ZOFRAN) injection 4 mg  4 mg IntraVENous Q6H PRN       Imaging:   XR TIBIA FIBULA RIGHT (2 VIEWS)    Result Date: 7/11/2023  Acute tibial and fibular fractures. Please see report for additional details. XR ANKLE RIGHT (2 VIEWS)    Result Date: 7/12/2023  Findings/impression: Intraoperative fluoroscopic images used for surgical guidance. They are nondiagnostic. Please see operative report for full details. XR ANKLE RIGHT (MIN 3 VIEWS)    Result Date: 7/11/2023  Acute fracture of the tibial shaft. Question acute fracture of the medial malleolus. Potential acute fracture of the lateral malleolus. Correlate with point tenderness. Small ossific density at the talonavicular articulation presumed degenerative. Correlate with point tenderness. Associated extensive soft tissue edema. Please see report for additional details. CT ANKLE RIGHT WO CONTRAST    Result Date: 7/11/2023  :  Oblique fracture distal tibia. Tiny ossific density inferior to medial malleolus that could be fracture fragment from the tip of the malleolus. No donor site seen.  Lesion inferior to the lateral malleolus probably related to old injury, less likely acute

## 2023-07-25 NOTE — PLAN OF CARE
Problem: Occupational Therapy - Adult  Goal: By Discharge: Performs self-care activities at highest level of function for planned discharge setting. See evaluation for individualized goals. Description: Occupational Therapy Goals:  Initiated 7/12/2023 to be met within 7-10 days, re-evaluation 7/19/2023 - pt making slow progress towards goals d/t impaired cognition, goal #4 modified, continue OT plan of care    1. Patient will perform bed mobility in preparation for ADLs with supervision/set-up. 2.  Patient will perform upper body dressing with modified independence. 3.  Patient will perform functional activity/grooming task sitting EOB with supervision/set-up, G balance. 4.  Patient will perform bedside commode transfers with minimal assistance/contact guard assist maintaining NWB using RW. 5.  Patient will perform all aspects of toileting with minimal assistance/contact guard assist.  6.  Patient will participate in upper extremity therapeutic exercise/activities with supervision/set-up for 8-10 minutes to increase ROM/strength for ADLs. 7.  Patient will utilize energy conservation techniques during functional activities with verbal cues. PLOF: mPatient a poor historian. Per chart review, pt received assist for ADLs.    Outcome: Progressing   OCCUPATIONAL THERAPY TREATMENT    Patient: Yohan Ward (89 y.o. 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 \"    OBJECTIVE DATA SUMMARY:     ADL Intervention:     Grooming: Setup     UE Therapeutic Exercises:       Pain:  Pain level pre-treatment: 0/10   Pain level post-treatment: 0/10    Activity Tolerance:    Activity Tolerance: Treatment limited secondary to decreased cognition  Please refer to the flowsheet for vital signs taken during this treatment. After treatment:   []  Patient left in no apparent distress sitting up in chair  [x]  Patient left in no apparent distress in bed  [x]  Call bell left within reach  [x]  Nursing notified  []  Caregiver present  [x]  Bed alarm activated    COMMUNICATION/EDUCATION:   Patient Education  Education Given To: Patient  Education Provided: Role of Therapy; Energy Conservation;Plan of Care;Precautions; ADL Adaptive Strategies  Education Method: Demonstration;Verbal;Teach Back  Barriers to Learning: None  Education Outcome: Continued education needed      Thank you for this referral.  PRIETO Pantoja  Minutes: 23

## 2023-07-25 NOTE — PROGRESS NOTES
Two attempts made to reestablish IV access after IV was leaking and painful for patient. Unsuccessful, will notify primary nurse.

## 2023-07-26 ENCOUNTER — APPOINTMENT (OUTPATIENT)
Facility: HOSPITAL | Age: 63
DRG: 494 | End: 2023-07-26
Payer: MEDICARE

## 2023-07-26 ENCOUNTER — ANESTHESIA (OUTPATIENT)
Facility: HOSPITAL | Age: 63
End: 2023-07-26
Payer: MEDICARE

## 2023-07-26 PROCEDURE — 6360000002 HC RX W HCPCS: Performed by: NURSE ANESTHETIST, CERTIFIED REGISTERED

## 2023-07-26 PROCEDURE — 0QPJ05Z REMOVAL OF EXTERNAL FIXATION DEVICE FROM RIGHT FIBULA, OPEN APPROACH: ICD-10-PCS | Performed by: ORTHOPAEDIC SURGERY

## 2023-07-26 PROCEDURE — 7100000001 HC PACU RECOVERY - ADDTL 15 MIN: Performed by: ORTHOPAEDIC SURGERY

## 2023-07-26 PROCEDURE — 2500000003 HC RX 250 WO HCPCS: Performed by: NURSE ANESTHETIST, CERTIFIED REGISTERED

## 2023-07-26 PROCEDURE — 0QSJ04Z REPOSITION RIGHT FIBULA WITH INTERNAL FIXATION DEVICE, OPEN APPROACH: ICD-10-PCS | Performed by: ORTHOPAEDIC SURGERY

## 2023-07-26 PROCEDURE — 2720000010 HC SURG SUPPLY STERILE: Performed by: ORTHOPAEDIC SURGERY

## 2023-07-26 PROCEDURE — 2709999900 HC NON-CHARGEABLE SUPPLY: Performed by: ORTHOPAEDIC SURGERY

## 2023-07-26 PROCEDURE — 1100000000 HC RM PRIVATE

## 2023-07-26 PROCEDURE — 27758 TREATMENT OF TIBIA FRACTURE: CPT | Performed by: ORTHOPAEDIC SURGERY

## 2023-07-26 PROCEDURE — 3600000012 HC SURGERY LEVEL 2 ADDTL 15MIN: Performed by: ORTHOPAEDIC SURGERY

## 2023-07-26 PROCEDURE — 6360000002 HC RX W HCPCS: Performed by: ANESTHESIOLOGY

## 2023-07-26 PROCEDURE — 27780 TREATMENT OF FIBULA FRACTURE: CPT | Performed by: ORTHOPAEDIC SURGERY

## 2023-07-26 PROCEDURE — 99232 SBSQ HOSP IP/OBS MODERATE 35: CPT | Performed by: STUDENT IN AN ORGANIZED HEALTH CARE EDUCATION/TRAINING PROGRAM

## 2023-07-26 PROCEDURE — 64445 NJX AA&/STRD SCIATIC NRV IMG: CPT | Performed by: ANESTHESIOLOGY

## 2023-07-26 PROCEDURE — 7100000000 HC PACU RECOVERY - FIRST 15 MIN: Performed by: ORTHOPAEDIC SURGERY

## 2023-07-26 PROCEDURE — 3600000002 HC SURGERY LEVEL 2 BASE: Performed by: ORTHOPAEDIC SURGERY

## 2023-07-26 PROCEDURE — 0QPG05Z REMOVAL OF EXTERNAL FIXATION DEVICE FROM RIGHT TIBIA, OPEN APPROACH: ICD-10-PCS | Performed by: ORTHOPAEDIC SURGERY

## 2023-07-26 PROCEDURE — 0QSG04Z REPOSITION RIGHT TIBIA WITH INTERNAL FIXATION DEVICE, OPEN APPROACH: ICD-10-PCS | Performed by: ORTHOPAEDIC SURGERY

## 2023-07-26 PROCEDURE — 73600 X-RAY EXAM OF ANKLE: CPT

## 2023-07-26 PROCEDURE — C9399 UNCLASSIFIED DRUGS OR BIOLOG: HCPCS | Performed by: NURSE ANESTHETIST, CERTIFIED REGISTERED

## 2023-07-26 PROCEDURE — C1713 ANCHOR/SCREW BN/BN,TIS/BN: HCPCS | Performed by: ORTHOPAEDIC SURGERY

## 2023-07-26 PROCEDURE — 3700000001 HC ADD 15 MINUTES (ANESTHESIA): Performed by: ORTHOPAEDIC SURGERY

## 2023-07-26 PROCEDURE — 2580000003 HC RX 258: Performed by: ORTHOPAEDIC SURGERY

## 2023-07-26 PROCEDURE — 2780000010 HC IMPLANT OTHER: Performed by: ORTHOPAEDIC SURGERY

## 2023-07-26 PROCEDURE — 6370000000 HC RX 637 (ALT 250 FOR IP): Performed by: ORTHOPAEDIC SURGERY

## 2023-07-26 PROCEDURE — 6370000000 HC RX 637 (ALT 250 FOR IP): Performed by: NURSE ANESTHETIST, CERTIFIED REGISTERED

## 2023-07-26 PROCEDURE — 3700000000 HC ANESTHESIA ATTENDED CARE: Performed by: ORTHOPAEDIC SURGERY

## 2023-07-26 PROCEDURE — 6370000000 HC RX 637 (ALT 250 FOR IP): Performed by: PHYSICIAN ASSISTANT

## 2023-07-26 PROCEDURE — 20694 RMVL EXT FIXJ SYS UNDER ANES: CPT | Performed by: ORTHOPAEDIC SURGERY

## 2023-07-26 PROCEDURE — 2580000003 HC RX 258: Performed by: NURSE ANESTHETIST, CERTIFIED REGISTERED

## 2023-07-26 DEVICE — SCREW BNE L28MM DIA3.5MM CORT S STL ST FULL THRD T15 DRV: Type: IMPLANTABLE DEVICE | Site: TIBIA | Status: FUNCTIONAL

## 2023-07-26 DEVICE — SCREW BNE L45MM DIA3.5MM CORT S STL ST NONCANNULATED LOK: Type: IMPLANTABLE DEVICE | Site: TIBIA | Status: FUNCTIONAL

## 2023-07-26 DEVICE — SCREW BNE L26MM DIA3.5MM CORT S STL ST FULL THRD T15: Type: IMPLANTABLE DEVICE | Site: TIBIA | Status: FUNCTIONAL

## 2023-07-26 DEVICE — SCREW BNE L38MM DIA2.7MM S STL ST VAR ANG LOK FULL THRD T8: Type: IMPLANTABLE DEVICE | Site: TIBIA | Status: FUNCTIONAL

## 2023-07-26 DEVICE — IMPLANTABLE DEVICE: Type: IMPLANTABLE DEVICE | Site: TIBIA | Status: FUNCTIONAL

## 2023-07-26 DEVICE — SCREW BNE L36MM DIA2.7MM ANK S STL ST VAR ANG LOK FULL THRD: Type: IMPLANTABLE DEVICE | Site: TIBIA | Status: FUNCTIONAL

## 2023-07-26 DEVICE — SCREW BNE L34MM DIA2.7MM S STL ST VAR ANG LOK FULL THRD T8: Type: IMPLANTABLE DEVICE | Site: TIBIA | Status: FUNCTIONAL

## 2023-07-26 RX ORDER — FAMOTIDINE 20 MG/1
20 TABLET, FILM COATED ORAL ONCE
Status: COMPLETED | OUTPATIENT
Start: 2023-07-26 | End: 2023-07-26

## 2023-07-26 RX ORDER — DEXAMETHASONE SODIUM PHOSPHATE 4 MG/ML
INJECTION, SOLUTION INTRA-ARTICULAR; INTRALESIONAL; INTRAMUSCULAR; INTRAVENOUS; SOFT TISSUE PRN
Status: DISCONTINUED | OUTPATIENT
Start: 2023-07-26 | End: 2023-07-26 | Stop reason: SDUPTHER

## 2023-07-26 RX ORDER — LIDOCAINE HYDROCHLORIDE 20 MG/ML
INJECTION, SOLUTION EPIDURAL; INFILTRATION; INTRACAUDAL; PERINEURAL PRN
Status: DISCONTINUED | OUTPATIENT
Start: 2023-07-26 | End: 2023-07-26 | Stop reason: SDUPTHER

## 2023-07-26 RX ORDER — DEXTROSE MONOHYDRATE 100 MG/ML
INJECTION, SOLUTION INTRAVENOUS CONTINUOUS PRN
Status: DISCONTINUED | OUTPATIENT
Start: 2023-07-26 | End: 2023-07-26 | Stop reason: HOSPADM

## 2023-07-26 RX ORDER — ROPIVACAINE HYDROCHLORIDE 5 MG/ML
30 INJECTION, SOLUTION EPIDURAL; INFILTRATION; PERINEURAL ONCE
Status: COMPLETED | OUTPATIENT
Start: 2023-07-26 | End: 2023-07-26

## 2023-07-26 RX ORDER — LIDOCAINE HYDROCHLORIDE 10 MG/ML
1 INJECTION, SOLUTION EPIDURAL; INFILTRATION; INTRACAUDAL; PERINEURAL
Status: COMPLETED | OUTPATIENT
Start: 2023-07-26 | End: 2023-07-26

## 2023-07-26 RX ORDER — DIPHENHYDRAMINE HYDROCHLORIDE 50 MG/ML
12.5 INJECTION INTRAMUSCULAR; INTRAVENOUS
Status: DISCONTINUED | OUTPATIENT
Start: 2023-07-26 | End: 2023-07-26 | Stop reason: HOSPADM

## 2023-07-26 RX ORDER — SUCCINYLCHOLINE/SOD CL,ISO/PF 100 MG/5ML
SYRINGE (ML) INTRAVENOUS PRN
Status: DISCONTINUED | OUTPATIENT
Start: 2023-07-26 | End: 2023-07-26 | Stop reason: SDUPTHER

## 2023-07-26 RX ORDER — ONDANSETRON 2 MG/ML
4 INJECTION INTRAMUSCULAR; INTRAVENOUS
Status: DISCONTINUED | OUTPATIENT
Start: 2023-07-26 | End: 2023-07-26 | Stop reason: HOSPADM

## 2023-07-26 RX ORDER — CEFAZOLIN SODIUM 1 G/3ML
INJECTION, POWDER, FOR SOLUTION INTRAMUSCULAR; INTRAVENOUS PRN
Status: DISCONTINUED | OUTPATIENT
Start: 2023-07-26 | End: 2023-07-26 | Stop reason: SDUPTHER

## 2023-07-26 RX ORDER — SODIUM CHLORIDE 9 MG/ML
INJECTION, SOLUTION INTRAVENOUS PRN
Status: DISCONTINUED | OUTPATIENT
Start: 2023-07-26 | End: 2023-08-02 | Stop reason: HOSPADM

## 2023-07-26 RX ORDER — ROCURONIUM BROMIDE 10 MG/ML
INJECTION, SOLUTION INTRAVENOUS PRN
Status: DISCONTINUED | OUTPATIENT
Start: 2023-07-26 | End: 2023-07-26 | Stop reason: SDUPTHER

## 2023-07-26 RX ORDER — ONDANSETRON 4 MG/1
4 TABLET, ORALLY DISINTEGRATING ORAL EVERY 8 HOURS PRN
Status: DISCONTINUED | OUTPATIENT
Start: 2023-07-26 | End: 2023-08-02 | Stop reason: HOSPADM

## 2023-07-26 RX ORDER — ONDANSETRON 2 MG/ML
4 INJECTION INTRAMUSCULAR; INTRAVENOUS EVERY 6 HOURS PRN
Status: DISCONTINUED | OUTPATIENT
Start: 2023-07-26 | End: 2023-08-02 | Stop reason: HOSPADM

## 2023-07-26 RX ORDER — PROPOFOL 10 MG/ML
INJECTION, EMULSION INTRAVENOUS PRN
Status: DISCONTINUED | OUTPATIENT
Start: 2023-07-26 | End: 2023-07-26 | Stop reason: SDUPTHER

## 2023-07-26 RX ORDER — SODIUM CHLORIDE 0.9 % (FLUSH) 0.9 %
5-40 SYRINGE (ML) INJECTION PRN
Status: DISCONTINUED | OUTPATIENT
Start: 2023-07-26 | End: 2023-07-26 | Stop reason: HOSPADM

## 2023-07-26 RX ORDER — PHENYLEPHRINE HYDROCHLORIDE 10 MG/ML
INJECTION INTRAVENOUS PRN
Status: DISCONTINUED | OUTPATIENT
Start: 2023-07-26 | End: 2023-07-26 | Stop reason: SDUPTHER

## 2023-07-26 RX ORDER — MIDAZOLAM HYDROCHLORIDE 2 MG/2ML
2 INJECTION, SOLUTION INTRAMUSCULAR; INTRAVENOUS ONCE
Status: COMPLETED | OUTPATIENT
Start: 2023-07-26 | End: 2023-07-26

## 2023-07-26 RX ORDER — CHLORHEXIDINE GLUCONATE 4 G/100ML
SOLUTION TOPICAL PRN
Status: DISCONTINUED | OUTPATIENT
Start: 2023-07-26 | End: 2023-07-26 | Stop reason: HOSPADM

## 2023-07-26 RX ORDER — ONDANSETRON 2 MG/ML
INJECTION INTRAMUSCULAR; INTRAVENOUS PRN
Status: DISCONTINUED | OUTPATIENT
Start: 2023-07-26 | End: 2023-07-26 | Stop reason: SDUPTHER

## 2023-07-26 RX ORDER — ROPIVACAINE HYDROCHLORIDE 5 MG/ML
INJECTION, SOLUTION EPIDURAL; INFILTRATION; PERINEURAL
Status: COMPLETED | OUTPATIENT
Start: 2023-07-26 | End: 2023-07-26

## 2023-07-26 RX ORDER — SODIUM CHLORIDE 0.9 % (FLUSH) 0.9 %
5-40 SYRINGE (ML) INJECTION EVERY 12 HOURS SCHEDULED
Status: DISCONTINUED | OUTPATIENT
Start: 2023-07-26 | End: 2023-08-02 | Stop reason: HOSPADM

## 2023-07-26 RX ORDER — FENTANYL CITRATE 50 UG/ML
50 INJECTION, SOLUTION INTRAMUSCULAR; INTRAVENOUS EVERY 5 MIN PRN
Status: DISCONTINUED | OUTPATIENT
Start: 2023-07-26 | End: 2023-07-26 | Stop reason: HOSPADM

## 2023-07-26 RX ORDER — FENTANYL CITRATE 50 UG/ML
INJECTION, SOLUTION INTRAMUSCULAR; INTRAVENOUS PRN
Status: DISCONTINUED | OUTPATIENT
Start: 2023-07-26 | End: 2023-07-26 | Stop reason: SDUPTHER

## 2023-07-26 RX ORDER — SODIUM CHLORIDE 0.9 % (FLUSH) 0.9 %
5-40 SYRINGE (ML) INJECTION PRN
Status: DISCONTINUED | OUTPATIENT
Start: 2023-07-26 | End: 2023-08-02 | Stop reason: HOSPADM

## 2023-07-26 RX ORDER — FENTANYL CITRATE 50 UG/ML
100 INJECTION, SOLUTION INTRAMUSCULAR; INTRAVENOUS ONCE
Status: COMPLETED | OUTPATIENT
Start: 2023-07-26 | End: 2023-07-26

## 2023-07-26 RX ORDER — SODIUM CHLORIDE, SODIUM LACTATE, POTASSIUM CHLORIDE, CALCIUM CHLORIDE 600; 310; 30; 20 MG/100ML; MG/100ML; MG/100ML; MG/100ML
INJECTION, SOLUTION INTRAVENOUS CONTINUOUS
Status: DISCONTINUED | OUTPATIENT
Start: 2023-07-26 | End: 2023-07-26 | Stop reason: HOSPADM

## 2023-07-26 RX ORDER — ENOXAPARIN SODIUM 100 MG/ML
40 INJECTION SUBCUTANEOUS DAILY
Status: DISCONTINUED | OUTPATIENT
Start: 2023-07-27 | End: 2023-08-02 | Stop reason: HOSPADM

## 2023-07-26 RX ADMIN — FENTANYL CITRATE 50 MCG: 50 INJECTION, SOLUTION INTRAMUSCULAR; INTRAVENOUS at 07:09

## 2023-07-26 RX ADMIN — GABAPENTIN 300 MG: 100 CAPSULE ORAL at 21:02

## 2023-07-26 RX ADMIN — PHENYLEPHRINE HYDROCHLORIDE 200 MCG: 10 INJECTION INTRAVENOUS at 08:05

## 2023-07-26 RX ADMIN — SUGAMMADEX 100 MG: 100 INJECTION, SOLUTION INTRAVENOUS at 09:42

## 2023-07-26 RX ADMIN — LIDOCAINE HYDROCHLORIDE 60 MG: 20 INJECTION, SOLUTION EPIDURAL; INFILTRATION; INTRACAUDAL; PERINEURAL at 07:35

## 2023-07-26 RX ADMIN — PHENYLEPHRINE HYDROCHLORIDE 200 MCG: 10 INJECTION INTRAVENOUS at 09:02

## 2023-07-26 RX ADMIN — ATORVASTATIN CALCIUM 80 MG: 40 TABLET, FILM COATED ORAL at 21:02

## 2023-07-26 RX ADMIN — SODIUM CHLORIDE, PRESERVATIVE FREE 10 ML: 5 INJECTION INTRAVENOUS at 21:02

## 2023-07-26 RX ADMIN — Medication 100 MG: at 07:36

## 2023-07-26 RX ADMIN — DEXAMETHASONE SODIUM PHOSPHATE 4 MG: 4 INJECTION, SOLUTION INTRAMUSCULAR; INTRAVENOUS at 08:20

## 2023-07-26 RX ADMIN — LIDOCAINE HYDROCHLORIDE 1 ML: 10 INJECTION, SOLUTION EPIDURAL; INFILTRATION; INTRACAUDAL; PERINEURAL at 07:09

## 2023-07-26 RX ADMIN — CEFAZOLIN 2 G: 330 INJECTION, POWDER, FOR SOLUTION INTRAMUSCULAR; INTRAVENOUS at 07:43

## 2023-07-26 RX ADMIN — ONDANSETRON 4 MG: 2 INJECTION INTRAMUSCULAR; INTRAVENOUS at 09:41

## 2023-07-26 RX ADMIN — PROPOFOL 50 MG: 10 INJECTION, EMULSION INTRAVENOUS at 09:56

## 2023-07-26 RX ADMIN — ROPIVACAINE HYDROCHLORIDE 30 ML: 5 INJECTION EPIDURAL; INFILTRATION; PERINEURAL at 07:20

## 2023-07-26 RX ADMIN — SODIUM CHLORIDE, POTASSIUM CHLORIDE, SODIUM LACTATE AND CALCIUM CHLORIDE: 600; 310; 30; 20 INJECTION, SOLUTION INTRAVENOUS at 06:57

## 2023-07-26 RX ADMIN — ROPIVACAINE HYDROCHLORIDE 30 ML: 5 INJECTION EPIDURAL; INFILTRATION; PERINEURAL at 07:10

## 2023-07-26 RX ADMIN — FENTANYL CITRATE 50 MCG: 50 INJECTION INTRAMUSCULAR; INTRAVENOUS at 07:33

## 2023-07-26 RX ADMIN — PHENYLEPHRINE HYDROCHLORIDE 100 MCG: 10 INJECTION INTRAVENOUS at 08:54

## 2023-07-26 RX ADMIN — FAMOTIDINE 20 MG: 20 TABLET, FILM COATED ORAL at 06:57

## 2023-07-26 RX ADMIN — MIDAZOLAM HYDROCHLORIDE 2 MG: 1 INJECTION, SOLUTION INTRAMUSCULAR; INTRAVENOUS at 07:09

## 2023-07-26 RX ADMIN — PHENYLEPHRINE HYDROCHLORIDE 200 MCG: 10 INJECTION INTRAVENOUS at 08:20

## 2023-07-26 RX ADMIN — GABAPENTIN 100 MG: 100 CAPSULE ORAL at 21:02

## 2023-07-26 RX ADMIN — PHENYLEPHRINE HYDROCHLORIDE 200 MCG: 10 INJECTION INTRAVENOUS at 08:32

## 2023-07-26 RX ADMIN — PHENYLEPHRINE HYDROCHLORIDE 200 MCG: 10 INJECTION INTRAVENOUS at 07:49

## 2023-07-26 RX ADMIN — ROCURONIUM BROMIDE 20 MG: 10 INJECTION, SOLUTION INTRAVENOUS at 08:08

## 2023-07-26 RX ADMIN — PROPOFOL 100 MG: 10 INJECTION, EMULSION INTRAVENOUS at 07:35

## 2023-07-26 RX ADMIN — GABAPENTIN 100 MG: 100 CAPSULE ORAL at 13:26

## 2023-07-26 RX ADMIN — METOPROLOL SUCCINATE 100 MG: 100 TABLET, EXTENDED RELEASE ORAL at 13:26

## 2023-07-26 RX ADMIN — SODIUM CHLORIDE, PRESERVATIVE FREE 10 ML: 5 INJECTION INTRAVENOUS at 13:27

## 2023-07-26 ASSESSMENT — PAIN SCALES - WONG BAKER: WONGBAKER_NUMERICALRESPONSE: 0

## 2023-07-26 ASSESSMENT — PAIN SCALES - GENERAL
PAINLEVEL_OUTOF10: 0
PAINLEVEL_OUTOF10: 0

## 2023-07-26 ASSESSMENT — PAIN - FUNCTIONAL ASSESSMENT: PAIN_FUNCTIONAL_ASSESSMENT: 0-10

## 2023-07-26 NOTE — RT PROTOCOL NOTE
NEB PROTOCOL:  ASSESSMENT    Patient  Jack Randle     58 y.o.   female     7/25/2023  8:57 PM    Breath Sounds Pre Procedure:                                               Breath Sounds Post Procedure:                                                 Breathing pattern: Pre procedure             Post procedure       Cough: Pre procedure                  Post procedure      Heart Rate: Pre procedure             Post procedure      Resp Rate: Pre procedure              Post procedure          Nebulizer Therapy: Current medications         Problem List:   Patient Active Problem List   Diagnosis    Osteoarthritis    Multiple sclerosis ([address])    History of traumatic brain injury    Neurogenic bladder    Localized osteoarthritis of left knee    Hypertension    DVT of leg (deep venous thrombosis) ([facility])    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    Closed fracture of proximal end of right fibula    Spinal stenosis of cervical region with radiculopathy    Closed fracture of right distal tibia       Patient alert and cooperative to use MDI: yes    Home Respiratory Therapy Regimen/Frequency:  yes  Medication albuterol  DeviceMDI  Frequency PRN    SEVERITY INDEX:    ITEM 0 1 2 3 4 Score   Respiratory Pattern and or Rate Regular  10-19 Regular  20-24   24-30    30-34 Severe SOB or   Greater than 35 0   Breath Sounds Clear Occasional Wheeze Mild Wheezing Moderate Wheezing  wheezing/Absent breath sounds 0   Shortness of Breath None Dyspnea on Exertion Dyspnea at Rest Moderate Shortness of Breath at Rest Severe Shortness of Breath - Limited Speech 0       Total Score:  0    * Scoring Guidelines  0-4 pts:  PRN-BID   5-7 pts:  BID, TID, QID  8-9 pts:  TID, QID, Q6  10-12 pts:  Q4-Q6  * - Guidelines used with clinical judgement. PRN Treatments can be ordered to supplement scheduled treatments. Regardless of score, frequency should not be less than normal home regimen.     Recommended

## 2023-07-26 NOTE — CARE COORDINATION
CM followed up with Mr. Franny Oliveira regarding SNF. Choices:  [facility]- still reviewing  [facility]-  will visit today  [facility]-  will visit today  [facility]-  will visit today. CM reiterated to pt  that his requested SNF do not have availability, do not accept payor source or declined to accept.  to call CM this afternoon with final SNFchoice to start auth. CM updated careport notes for review.          April MATT Tang  Care Management

## 2023-07-26 NOTE — PLAN OF CARE
Problem: Discharge Planning  Goal: Discharge to home or other facility with appropriate resources  Outcome: Progressing     Problem: Safety - Adult  Goal: Free from fall injury  Outcome: Progressing     Problem: ABCDS Injury Assessment  Goal: Absence of physical injury  Outcome: Progressing     Problem: Skin/Tissue Integrity  Goal: Absence of new skin breakdown  Description: 1. Monitor for areas of redness and/or skin breakdown  2. Assess vascular access sites hourly  3. Every 4-6 hours minimum:  Change oxygen saturation probe site  4. Every 4-6 hours:  If on nasal continuous positive airway pressure, respiratory therapy assess nares and determine need for appliance change or resting period. Outcome: Progressing     Problem: Confusion  Goal: Confusion, delirium, dementia, or psychosis is improved or at baseline  Description: INTERVENTIONS:  1. Assess for possible contributors to thought disturbance, including medications, impaired vision or hearing, underlying metabolic abnormalities, dehydration, psychiatric diagnoses, and notify attending LIP  2. [place] high risk fall precautions, as indicated  3. Provide frequent short contacts to provide reality reorientation, refocusing and direction  4. Decrease environmental stimuli, including noise as appropriate  5. Monitor and intervene to maintain adequate nutrition, hydration, elimination, sleep and activity  6. If unable to ensure safety without constant attention obtain sitter and review sitter guidelines with assigned personnel  7.  Initiate Psychosocial CNS and Spiritual Care consult, as indicated  Outcome: Progressing

## 2023-07-26 NOTE — PERIOP NOTE
TRANSFER - OUT REPORT:    Verbal report given to [address] [place] [address] on [facility] Levels  being transferred to  for routine post-op       Report consisted of patient's Situation, Background, Assessment and   Recommendations(SBAR). Information from the following report(s) Surgery Report, MAR, and Recent Results was reviewed with the receiving nurse. Lines:       Opportunity for questions and clarification was provided.       Patient transported with:  [employer]

## 2023-07-26 NOTE — PROGRESS NOTES
IV AB x 24 hrs  Pain meds  Lovenox resume tomorrow  PT/OT: No weight right leg  DC disposition pending    GUANAKITO Villanueva MD  7/26/2023 10:29 AM

## 2023-07-26 NOTE — PROGRESS NOTES
[facility] Hospitalist Group  Progress Note    Patient: Gracie Lim Age: 58 y.o. : 1960 MR#: [id number]: [id number]  Date/Time: 2023     Subjective:     Patient seen and evaluated, sitting up in bed, no acute distress. Patient is status post surgery to right tibia and fibula for closed fracture. Patient has external fixators in place. Pt is being evaluated by ARU or SNF     - patient's  is okay with her going to ARU. Authorization started by ARU, will continue to follow    -continue PT OT, await input from rehab regarding insurance authorization. Continue current treatment plan. Discussed with  at bedside. 7/15-continue current treatment plan, awaiting insurance authorization for patient to go to acute rehab facility. -continue current treatment plan, [id number] advised by insurance company. We will follow. Continue current treatment plan     -continue current treatment plan. Anticipate discharge to acute rehab if accepted. -continue current treatment plan. Ortho note reviewed. Peer to peer was denied. Insurance company mentioned that we can appeal.  I have given the information to the . Discussed with  at bedside. -continue current treatment plan. Case management on board for discharge planning    -continue current treatment plan, case management board for discharge planning. Staged ORIF  per Ortho.    -continue current treatment plan. Staged ORIF . We will follow. Patient will likely not be discharged to skilled nursing facility since she requires authorization and the earliest possible discharge will be on  to return for surgery on . We will continue to monitor. -continue current treatment plan, staged ORIF .    -continue current treatment plan, staged ORIF .     - continue current plan. Staged ORIF on .  Patient upset as her ondansetron (ZOFRAN) injection 4 mg  4 mg IntraVENous Q6H PRN    [START ON 7/27/2023] enoxaparin (LOVENOX) injection 40 mg  40 mg SubCUTAneous Daily    sodium chloride flush 0.9 % injection 5-40 mL  5-40 mL IntraVENous 2 times per day    sodium chloride flush 0.9 % injection 5-40 mL  5-40 mL IntraVENous PRN    polyethylene glycol (GLYCOLAX) packet 17 g  17 g Oral Daily PRN    bisacodyl (DULCOLAX) suppository 10 mg  10 mg Rectal Daily PRN    acetaminophen (TYLENOL) tablet 650 mg  650 mg Oral Q6H PRN    Or    acetaminophen (TYLENOL) suppository 650 mg  650 mg Rectal Q6H PRN    morphine (PF) injection 2 mg  2 mg IntraVENous Q4H PRN    atorvastatin (LIPITOR) tablet 80 mg  80 mg Oral Nightly    gabapentin (NEURONTIN) capsule 100 mg  100 mg Oral TID    gabapentin (NEURONTIN) capsule 300 mg  300 mg Oral BID    metoprolol succinate (TOPROL XL) extended release tablet 100 mg  100 mg Oral Daily    Teriflunomide TABS 14 mg (Patient Supplied)  14 mg Oral Daily    sodium chloride flush 0.9 % injection 5-40 mL  5-40 mL IntraVENous 2 times per day    sodium chloride flush 0.9 % injection 5-40 mL  5-40 mL IntraVENous PRN    ondansetron (ZOFRAN-ODT) disintegrating tablet 4 mg  4 mg Oral Q8H PRN    Or    ondansetron (ZOFRAN) injection 4 mg  4 mg IntraVENous Q6H PRN       Imaging:   XR TIBIA FIBULA RIGHT (2 VIEWS)    Result Date: 7/11/2023  Acute tibial and fibular fractures. Please see report for additional details. XR ANKLE RIGHT (2 VIEWS)    Result Date: 7/12/2023  Findings/impression: Intraoperative fluoroscopic images used for surgical guidance. They are nondiagnostic. Please see operative report for full details. XR ANKLE RIGHT (MIN 3 VIEWS)    Result Date: 7/11/2023  Acute fracture of the tibial shaft. Question acute fracture of the medial malleolus. Potential acute fracture of the lateral malleolus. Correlate with point tenderness. Small ossific density at the talonavicular articulation presumed degenerative.  Correlate

## 2023-07-26 NOTE — ANESTHESIA POSTPROCEDURE EVALUATION
Department of Anesthesiology  Postprocedure Note    Patient: Xiomara Becker  MRN: [id number]  YOB: 1960  Date of evaluation: 7/26/2023      Procedure Summary     Date: 07/26/23 Room / Location: [facility] MAIN 07 / [facility] MAIN OR    Anesthesia Start: 3977 Anesthesia Stop: 2603    Procedure: REMOVAL OF RIGHT EXTERNAL FIXATION, RIGHT TIBIA/FIBULA OPEN REDUCTION INTERNAL FIXATION; POSSIBLE ALLOGRAFT BONE GRAFTING; REGIONAL NERVE BLOCK; SYNTHES TIBIA PLATES; Ledora Jeong; C-ARM (Right: Foot) Diagnosis:       Closed fracture of right tibia and fibula, initial encounter      (Closed fracture of right tibia and fibula, initial encounter [S82.201A, S82.401A])    Surgeons: Margie Jaramillo MD Responsible Provider: Víctor Huitron MD    Anesthesia Type: General ASA Status: 3          Anesthesia Type: General    Carolyn Phase I: Carolyn Score: 9    Carolyn Phase II:        Anesthesia Post Evaluation    Patient location during evaluation: bedside  Airway patency: patent  Complications: no  Cardiovascular status: hemodynamically stable  Respiratory status: acceptable  Hydration status: stable

## 2023-07-26 NOTE — INTERVAL H&P NOTE
Update History & Physical    The patient's History and Physical of 7/26/2023 6:48 AM,  was reviewed with the patient and I examined the patient. There was no change. The surgical site was confirmed by the patient and me. Plan: The risks, benefits, expected outcome, and alternative to the recommended procedure have been discussed with the patient. Patient understands and wants to proceed with the procedure.      Electronically signed by Chelsey Aden MD on 7/26/2023 at 6:48 AM

## 2023-07-26 NOTE — PROGRESS NOTES
conducted a pre-surgery visit with Cesilia Diallo, who is a 58 y.o.,female. The  provided the following Interventions:  Initiated a relationship of care and support. Offered prayer and assurance of continued prayers on patient's behalf. Patient does not have aqn advance directive. Plan:  Chaplains will continue to follow and will provide pastoral care on an as needed/requested basis.  recommends bedside caregivers page  on duty if patient shows signs of acute spiritual or emotional distress.    [facility] [place] [facility]ified [facility]   [phone]

## 2023-07-26 NOTE — ANESTHESIA PROCEDURE NOTES
Peripheral Block    Patient location during procedure: holding area  Reason for block: post-op pain management  Start time: 7/26/2023 7:10 AM  End time: 7/26/2023 7:20 AM  Staffing  Performed: anesthesiologist   Anesthesiologist: Aj Talavera MD  Preanesthetic Checklist  Completed: patient identified, IV checked, site marked, risks and benefits discussed, surgical/procedural consents, equipment checked, pre-op evaluation, timeout performed, anesthesia consent given, oxygen available, monitors applied/VS acknowledged, fire risk safety assessment completed and verbalized and blood product R/B/A discussed and consented  Peripheral Block   Prep: ChloraPrep  Provider prep: sterile gloves and mask  Patient monitoring: cardiac monitor, continuous pulse ox, frequent blood pressure checks, IV access, oxygen and responsive to questions  Block type: Sciatic  Popliteal  Laterality: right  Injection technique: single-shot  Guidance: nerve stimulator and ultrasound guided  Local infiltration: lidocaine  Infiltration strength: 1 %  Local infiltration: lidocaine  Dose: 1 mL    Needle   Needle type: short-bevel   Needle gauge: 22 G  Needle localization: anatomical landmarks, nerve stimulator and ultrasound guidance  Test dose: negative  Needle length: 10 cm  Assessment   Injection assessment: negative aspiration for heme, no paresthesia on injection, local visualized surrounding nerve on ultrasound and no intravascular symptoms  Slow fractionated injection: yes  Hemodynamics: stable  Real-time US image taken/store: yes  Outcomes: uncomplicated    Additional Notes  Pop/ Saph nerve block  Medications Administered  ropivacaine (NAROPIN) injection 0.5% - Perineural   30 mL - 7/26/2023 7:20:00 AM

## 2023-07-27 LAB
ALBUMIN SERPL-MCNC: 2.7 G/DL (ref 3.4–5)
ALBUMIN/GLOB SERPL: 0.8 (ref 0.8–1.7)
ALP SERPL-CCNC: 90 U/L (ref 45–117)
ALT SERPL-CCNC: 24 U/L (ref 13–56)
ANION GAP SERPL CALC-SCNC: 4 MMOL/L (ref 3–18)
AST SERPL-CCNC: 18 U/L (ref 10–38)
BILIRUB SERPL-MCNC: 0.8 MG/DL (ref 0.2–1)
BUN SERPL-MCNC: 11 MG/DL (ref 7–18)
BUN/CREAT SERPL: 19 (ref 12–20)
CALCIUM SERPL-MCNC: 9.1 MG/DL (ref 8.5–10.1)
CHLORIDE SERPL-SCNC: 110 MMOL/L (ref 100–111)
CO2 SERPL-SCNC: 26 MMOL/L (ref 21–32)
CREAT SERPL-MCNC: 0.59 MG/DL (ref 0.6–1.3)
GLOBULIN SER CALC-MCNC: 3.6 G/DL (ref 2–4)
GLUCOSE SERPL-MCNC: 123 MG/DL (ref 74–99)
POTASSIUM SERPL-SCNC: 3.9 MMOL/L (ref 3.5–5.5)
PROT SERPL-MCNC: 6.3 G/DL (ref 6.4–8.2)
SODIUM SERPL-SCNC: 140 MMOL/L (ref 136–145)

## 2023-07-27 PROCEDURE — 36415 COLL VENOUS BLD VENIPUNCTURE: CPT

## 2023-07-27 PROCEDURE — 99024 POSTOP FOLLOW-UP VISIT: CPT | Performed by: ORTHOPAEDIC SURGERY

## 2023-07-27 PROCEDURE — 2580000003 HC RX 258: Performed by: ORTHOPAEDIC SURGERY

## 2023-07-27 PROCEDURE — 6370000000 HC RX 637 (ALT 250 FOR IP): Performed by: PHYSICIAN ASSISTANT

## 2023-07-27 PROCEDURE — 97168 OT RE-EVAL EST PLAN CARE: CPT

## 2023-07-27 PROCEDURE — 1100000000 HC RM PRIVATE

## 2023-07-27 PROCEDURE — 99232 SBSQ HOSP IP/OBS MODERATE 35: CPT | Performed by: HOSPITALIST

## 2023-07-27 PROCEDURE — 6360000002 HC RX W HCPCS: Performed by: ORTHOPAEDIC SURGERY

## 2023-07-27 PROCEDURE — 97164 PT RE-EVAL EST PLAN CARE: CPT

## 2023-07-27 PROCEDURE — 80053 COMPREHEN METABOLIC PANEL: CPT

## 2023-07-27 PROCEDURE — 97530 THERAPEUTIC ACTIVITIES: CPT

## 2023-07-27 PROCEDURE — 97535 SELF CARE MNGMENT TRAINING: CPT

## 2023-07-27 RX ADMIN — GABAPENTIN 300 MG: 100 CAPSULE ORAL at 08:28

## 2023-07-27 RX ADMIN — SODIUM CHLORIDE, PRESERVATIVE FREE 10 ML: 5 INJECTION INTRAVENOUS at 08:28

## 2023-07-27 RX ADMIN — TERIFLUNOMIDE 14 MG: 14 TABLET, FILM COATED ORAL at 08:38

## 2023-07-27 RX ADMIN — GABAPENTIN 300 MG: 100 CAPSULE ORAL at 20:45

## 2023-07-27 RX ADMIN — GABAPENTIN 100 MG: 100 CAPSULE ORAL at 08:27

## 2023-07-27 RX ADMIN — GABAPENTIN 100 MG: 100 CAPSULE ORAL at 15:21

## 2023-07-27 RX ADMIN — ENOXAPARIN SODIUM 40 MG: 100 INJECTION SUBCUTANEOUS at 08:27

## 2023-07-27 RX ADMIN — SODIUM CHLORIDE, PRESERVATIVE FREE 10 ML: 5 INJECTION INTRAVENOUS at 21:00

## 2023-07-27 RX ADMIN — GABAPENTIN 100 MG: 100 CAPSULE ORAL at 20:45

## 2023-07-27 RX ADMIN — ATORVASTATIN CALCIUM 80 MG: 40 TABLET, FILM COATED ORAL at 20:45

## 2023-07-27 RX ADMIN — METOPROLOL SUCCINATE 100 MG: 100 TABLET, EXTENDED RELEASE ORAL at 08:27

## 2023-07-27 NOTE — CARE COORDINATION
Notified Zack Young with [facility] to start [address]. She is checking to see if [facility] have a female bed available.      MATT Lepe  Care Management

## 2023-07-27 NOTE — PLAN OF CARE
Problem: Occupational Therapy - Adult  Goal: By Discharge: Performs self-care activities at highest level of function for planned discharge setting. See evaluation for individualized goals. Description: Occupational Therapy Goals:   Re-evaluated 7/27/2023, goals #1 and 3  met and updated, goal #4 modified, continue with goals to be met within 7-10 days    1. Patient will perform lower body dressing with minimal assistance using AE prn.   2.  Patient will perform upper body dressing with modified independence. 3.  Patient will perform bathing with minimal assistance using AE/adaptive strategies prn.  4.  Patient will perform bedside commode transfers with minimal assistance/contact guard assist maintaining NWB. 5.  Patient will perform all aspects of toileting with minimal assistance/contact guard assist.  6.  Patient will participate in upper extremity therapeutic exercise/activities with supervision/set-up for 8-10 minutes to increase ROM/strength for ADLs. 7.  Patient will utilize energy conservation techniques during functional activities with verbal cues. PLOF: mPatient a poor historian. Per chart review, pt received assist for ADLs.    Outcome: Progressing   OCCUPATIONAL THERAPY RE-EVALUATION    Patient: Alejandra Corey (3 y.o. 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]  Closed fracture of proximal end of right fibula, unspecified fracture morphology, initial encounter [S82.831A]  Closed fracture of distal end of right tibia, unspecified fracture morphology, initial encounter [S82.301A]  Procedure(s) (LRB):  REMOVAL OF RIGHT EXTERNAL FIXATION, RIGHT TIBIA/FIBULA OPEN REDUCTION INTERNAL FIXATION; POSSIBLE ALLOGRAFT BONE GRAFTING; REGIONAL NERVE BLOCK; SYNTHES TIBIA PLATES; VIVIGEN ALLOGRAFT; C-ARM (Right) 1 Day Post-Op   Precautions: Weight Bearing, Fall Risk, Right Lower Extremity Weight Bearing: Non Weight Bearing    ASSESSMENT :    Pt is motivated to participate in OT re-evaluation. Pt educated on NWB precautions for RLE, pt verbalized understanding, limited carryover, as pt continuously attempting to weigh bear through RLE during all functional mobility and ADL tasks. Pt performed mult grooming ADLs at EOB in preparation for functional txfr to the recliner simulating BSC txfr. Pt required Max A to std with FWW, however, was unable to safely maintain NWB precautions for RLE in standing. Pt educated on use of sliding board for functional txfrs to drop arm chair or BSC, pt was able to scoot to the recliner with Paul Eckert for safety. Pt positioned for comfort in recliner, set up to eat breakfast. All needs met. DEFICITS/IMPAIRMENTS:  Performance deficits / Impairments: Decreased functional mobility ; Decreased ADL status; Decreased strength;Decreased safe awareness;Decreased cognition;Decreased posture;Decreased endurance;Decreased fine motor control;Decreased coordination;Decreased balance    Patient will benefit from skilled intervention to address the above impairments. Patient's rehabilitation potential/Prognosis: Fair.   Factors which may influence rehabilitation potential include:  []             None noted  [x]             Mental ability/status  []             Medical condition  []             Home/family situation and support systems  []             Safety awareness  []             Pain tolerance/management  []             Other:      PLAN :  Recommendations and Planned Interventions:   [x]               Self Care Training                  [x]      Therapeutic Activities  [x]               Functional Mobility Training   []      Cognitive Retraining  [x]               Therapeutic Exercises           [x]      Endurance Activities  [x]               Balance Training                    []      Neuromuscular Re-Education  []               Visual/Perceptual Training     [x]      Home Safety

## 2023-07-27 NOTE — PROGRESS NOTES
Bedside report given to [facility] RN. Pt resting quietly in bed at this time, in no distress with stable vital signs.

## 2023-07-27 NOTE — PLAN OF CARE
Problem: Physical Therapy - Adult  Goal: By Discharge: Performs mobility at highest level of function for planned discharge setting. See evaluation for individualized goals. Description: Physical Therapy Goals:  Initiated 7/12/2023, re-evaluated 7/18/23, re-evaluated 7/24/23, re-evaluated 7/27/23 and goals adjusted as needed to be met within 7-10 days. 1.  Patient will move from supine to sit and sit to supine , scoot up and down, and roll side to side in bed with Marta. 2.  Patient will transfer from bed to chair and chair to bed with minimal assistance/contact guard assist using the least restrictive device. 3.  Patient will perform sit to stand with Laura . 4. Patient will ambulate with modA for 3 feet with the least restrictive device. PLOF: Pt reports Marta with rollator, electric RW?, lives with  in 1 story house with ramp to enter. Outcome: Progressing   PHYSICAL THERAPY RE-EVALUATION    Patient: Jack Randle (54 y.o. 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]  Closed fracture of proximal end of right fibula, unspecified fracture morphology, initial encounter [S82.831A]  Closed fracture of distal end of right tibia, unspecified fracture morphology, initial encounter [S82.301A]  Procedure(s) (LRB):  REMOVAL OF RIGHT EXTERNAL FIXATION, RIGHT TIBIA/FIBULA OPEN REDUCTION INTERNAL FIXATION; POSSIBLE ALLOGRAFT BONE GRAFTING; REGIONAL NERVE BLOCK; SYNTHES TIBIA PLATES; VIVIGEN ALLOGRAFT; C-ARM (Right) 1 Day Post-Op   Precautions: Weight Bearing, Fall Risk, Right Lower Extremity Weight Bearing: Non Weight Bearing    ASSESSMENT :  Pt cleared to participate in PT session, pt received semi-reclined in bed and agreeable to therapy session. Completing with OT to maximize safety and mobility. Pt now post ORIF of RLE, continues to be NWB.  Based on the objective data described below,

## 2023-07-27 NOTE — PLAN OF CARE
Problem: Discharge Planning  Goal: Discharge to home or other facility with appropriate resources  Outcome: Progressing  Flowsheets (Taken 7/27/2023 0800)  Discharge to home or other facility with appropriate resources:   Arrange for needed discharge resources and transportation as appropriate   Identify barriers to discharge with patient and caregiver   Identify discharge learning needs (meds, wound care, etc)   Arrange for interpreters to assist at discharge as needed   Refer to discharge planning if patient needs post-hospital services based on physician order or complex needs related to functional status, cognitive ability or social support system     Problem: Safety - Adult  Goal: Free from fall injury  Outcome: Progressing     Problem: ABCDS Injury Assessment  Goal: Absence of physical injury  Outcome: Progressing     Problem: Skin/Tissue Integrity  Goal: Absence of new skin breakdown  Description: 1. Monitor for areas of redness and/or skin breakdown  2. Assess vascular access sites hourly  3. Every 4-6 hours minimum:  Change oxygen saturation probe site  4. Every 4-6 hours:  If on nasal continuous positive airway pressure, respiratory therapy assess nares and determine need for appliance change or resting period. Outcome: Progressing     Problem: Physical Therapy - Adult  Goal: By Discharge: Performs mobility at highest level of function for planned discharge setting. See evaluation for individualized goals. Description: Physical Therapy Goals:  Initiated 7/12/2023, re-evaluated 7/18/23, re-evaluated 7/24/23, re-evaluated 7/27/23 and goals adjusted as needed to be met within 7-10 days. 1.  Patient will move from supine to sit and sit to supine , scoot up and down, and roll side to side in bed with Marta. 2.  Patient will transfer from bed to chair and chair to bed with minimal assistance/contact guard assist using the least restrictive device.   3.  Patient will perform sit to stand with Laura

## 2023-07-27 NOTE — PROGRESS NOTES
[facility] [facility] Hospitalist Group  Progress Note    Patient: Barbara Leyva Age: 58 y.o. : 1960 MR#: [id number] SSN: [id number]  Date/Time: 2023     Subjective:     Patient seen and evaluated, sitting up in bed, no acute distress. Patient is status post surgery to right tibia and fibula for closed fracture. Patient has external fixators in place. Pt is being evaluated by ARU or SNF     - patient's  is okay with her going to ARU. Authorization started by ARU, will continue to follow    -continue PT OT, await input from rehab regarding insurance authorization. Continue current treatment plan. Discussed with  at bedside. 7/15-continue current treatment plan, awaiting insurance authorization for patient to go to acute rehab facility. -continue current treatment plan, [id number] advised by insurance company. We will follow. Continue current treatment plan     -continue current treatment plan. Anticipate discharge to acute rehab if accepted. -continue current treatment plan. Ortho note reviewed. Peer to peer was denied. Insurance company mentioned that we can appeal.  I have given the information to the . Discussed with  at bedside. -continue current treatment plan. Case management on board for discharge planning    -continue current treatment plan, case management board for discharge planning. Staged ORIF  per Ortho.    -continue current treatment plan. Staged ORIF . We will follow. Patient will likely not be discharged to skilled nursing facility since she requires authorization and the earliest possible discharge will be on  to return for surgery on . We will continue to monitor. -continue current treatment plan, staged ORIF .    -continue current treatment plan, staged ORIF .     - continue current plan. Staged ORIF on .  Patient upset as her

## 2023-07-27 NOTE — CARE COORDINATION
[facility]-declined pt    Met with  and pt who decided on [address]. CM notified Carolina Jimenez ([address] [address]) of selection and to start [address]. Auth can be started once today's PT OT notes are entered.      MATT Lepe  Care Management

## 2023-07-28 PROCEDURE — 6370000000 HC RX 637 (ALT 250 FOR IP): Performed by: PHYSICIAN ASSISTANT

## 2023-07-28 PROCEDURE — 97530 THERAPEUTIC ACTIVITIES: CPT

## 2023-07-28 PROCEDURE — 1100000000 HC RM PRIVATE

## 2023-07-28 PROCEDURE — 97535 SELF CARE MNGMENT TRAINING: CPT

## 2023-07-28 PROCEDURE — 6360000002 HC RX W HCPCS: Performed by: ORTHOPAEDIC SURGERY

## 2023-07-28 PROCEDURE — 99232 SBSQ HOSP IP/OBS MODERATE 35: CPT | Performed by: HOSPITALIST

## 2023-07-28 PROCEDURE — 2580000003 HC RX 258: Performed by: ORTHOPAEDIC SURGERY

## 2023-07-28 RX ADMIN — SODIUM CHLORIDE, PRESERVATIVE FREE 10 ML: 5 INJECTION INTRAVENOUS at 20:37

## 2023-07-28 RX ADMIN — GABAPENTIN 100 MG: 100 CAPSULE ORAL at 09:18

## 2023-07-28 RX ADMIN — GABAPENTIN 300 MG: 100 CAPSULE ORAL at 09:18

## 2023-07-28 RX ADMIN — ATORVASTATIN CALCIUM 80 MG: 40 TABLET, FILM COATED ORAL at 20:37

## 2023-07-28 RX ADMIN — TERIFLUNOMIDE 14 MG: 14 TABLET, FILM COATED ORAL at 09:18

## 2023-07-28 RX ADMIN — GABAPENTIN 100 MG: 100 CAPSULE ORAL at 20:36

## 2023-07-28 RX ADMIN — METOPROLOL SUCCINATE 100 MG: 100 TABLET, EXTENDED RELEASE ORAL at 09:18

## 2023-07-28 RX ADMIN — GABAPENTIN 300 MG: 100 CAPSULE ORAL at 20:35

## 2023-07-28 RX ADMIN — SODIUM CHLORIDE, PRESERVATIVE FREE 10 ML: 5 INJECTION INTRAVENOUS at 09:20

## 2023-07-28 RX ADMIN — ENOXAPARIN SODIUM 40 MG: 100 INJECTION SUBCUTANEOUS at 09:18

## 2023-07-28 NOTE — PROGRESS NOTES
Bedside report given to MEETA Salas. Pt resting quietly in bed at this time, in no distress, with stable vital signs.

## 2023-07-28 NOTE — PROGRESS NOTES
[id number] [facility] Hospitalist Group  Progress Note    Patient: Terrence Marcelo Age: 58 y.o. : 1960 MR#: [id number]: [id number]  Date/Time: 2023     Subjective:     Patient seen and evaluated, sitting up in bed, no acute distress. Patient is status post surgery to right tibia and fibula for closed fracture. Patient has external fixators in place. Pt is being evaluated by ARU or SNF     - patient's  is okay with her going to ARU. Authorization started by ARU, will continue to follow    -continue PT OT, await input from rehab regarding insurance authorization. Continue current treatment plan. Discussed with  at bedside. 7/15-continue current treatment plan, awaiting insurance authorization for patient to go to acute rehab facility. -continue current treatment plan, [id number] advised by insurance company. We will follow. Continue current treatment plan     -continue current treatment plan. Anticipate discharge to acute rehab if accepted. -continue current treatment plan. Ortho note reviewed. Peer to peer was denied. Insurance company mentioned that we can appeal.  I have given the information to the . Discussed with  at bedside. -continue current treatment plan. Case management on board for discharge planning    -continue current treatment plan, case management board for discharge planning. Staged ORIF  per Ortho.    -continue current treatment plan. Staged ORIF . We will follow. Patient will likely not be discharged to skilled nursing facility since she requires authorization and the earliest possible discharge will be on  to return for surgery on . We will continue to monitor. -continue current treatment plan, staged ORIF .    -continue current treatment plan, staged ORIF .     - continue current plan. Staged ORIF on .  Patient upset as her report for additional details. CT ANKLE RIGHT WO CONTRAST    Result Date: 7/11/2023  :  Oblique fracture distal tibia. Tiny ossific density inferior to medial malleolus that could be fracture fragment from the tip of the malleolus. No donor site seen. Lesion inferior to the lateral malleolus probably related to old injury, less likely acute avulsion. Small joint effusion. Soft tissue swelling around the ankle. XR CHEST PORTABLE    Result Date: 7/11/2023  No acute findings. Labs:    Recent Results (from the past 48 hour(s))   Comprehensive Metabolic Panel    Collection Time: 07/27/23  1:43 AM   Result Value Ref Range    Sodium 140 136 - 145 mmol/L    Potassium 3.9 3.5 - 5.5 mmol/L    Chloride 110 100 - 111 mmol/L    CO2 26 21 - 32 mmol/L    Anion Gap 4 3.0 - 18 mmol/L    Glucose 123 (H) 74 - 99 mg/dL    BUN 11 7.0 - 18 MG/DL    Creatinine 0.59 (L) 0.6 - 1.3 MG/DL    Bun/Cre Ratio 19 12 - 20      Est, Glom Filt Rate >60 >60 ml/min/1.73m2    Calcium 9.1 8.5 - 10.1 MG/DL    Total Bilirubin 0.8 0.2 - 1.0 MG/DL    ALT 24 13 - 56 U/L    AST 18 10 - 38 U/L    Alk Phosphatase 90 45 - 117 U/L    Total Protein 6.3 (L) 6.4 - 8.2 g/dL    Albumin 2.7 (L) 3.4 - 5.0 g/dL    Globulin 3.6 2.0 - 4.0 g/dL    Albumin/Globulin Ratio 0.8 0.8 - 1.7             Signed By: Shane Mandujano MD     July 28, 2023      I spent 35 minutes with the patient in face-to-face consultation, of which greater than 50% was spent in counseling and coordination of care as described above    Disclaimer: Sections of this note are dictated using utilizing voice recognition software. Minor typographical errors may be present. If questions arise, please do not hesitate to contact me or call our department.

## 2023-07-28 NOTE — PLAN OF CARE
Problem: Occupational Therapy - Adult  Goal: By Discharge: Performs self-care activities at highest level of function for planned discharge setting. See evaluation for individualized goals. Description: Occupational Therapy Goals:   Re-evaluated 7/27/2023, goals #1 and 3  met and updated, goal #4 modified, continue with goals to be met within 7-10 days    1. Patient will perform lower body dressing with minimal assistance using AE prn.   2.  Patient will perform upper body dressing with modified independence. 3.  Patient will perform bathing with minimal assistance using AE/adaptive strategies prn.  4.  Patient will perform bedside commode transfers with minimal assistance/contact guard assist maintaining NWB. 5.  Patient will perform all aspects of toileting with minimal assistance/contact guard assist.  6.  Patient will participate in upper extremity therapeutic exercise/activities with supervision/set-up for 8-10 minutes to increase ROM/strength for ADLs. 7.  Patient will utilize energy conservation techniques during functional activities with verbal cues. PLOF: mPatient a poor historian. Per chart review, pt received assist for ADLs.    Outcome: Progressing   OCCUPATIONAL THERAPY TREATMENT    Patient: Nicko Storey (29 y.o. 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

## 2023-07-29 PROCEDURE — 6370000000 HC RX 637 (ALT 250 FOR IP): Performed by: PHYSICIAN ASSISTANT

## 2023-07-29 PROCEDURE — 99232 SBSQ HOSP IP/OBS MODERATE 35: CPT | Performed by: HOSPITALIST

## 2023-07-29 PROCEDURE — 2580000003 HC RX 258: Performed by: ORTHOPAEDIC SURGERY

## 2023-07-29 PROCEDURE — 1100000000 HC RM PRIVATE

## 2023-07-29 PROCEDURE — 6360000002 HC RX W HCPCS: Performed by: ORTHOPAEDIC SURGERY

## 2023-07-29 PROCEDURE — 99024 POSTOP FOLLOW-UP VISIT: CPT | Performed by: ORTHOPAEDIC SURGERY

## 2023-07-29 RX ADMIN — POLYETHYLENE GLYCOL 3350 17 G: 17 POWDER, FOR SOLUTION ORAL at 16:38

## 2023-07-29 RX ADMIN — GABAPENTIN 300 MG: 100 CAPSULE ORAL at 21:13

## 2023-07-29 RX ADMIN — ATORVASTATIN CALCIUM 80 MG: 40 TABLET, FILM COATED ORAL at 21:13

## 2023-07-29 RX ADMIN — GABAPENTIN 300 MG: 100 CAPSULE ORAL at 08:39

## 2023-07-29 RX ADMIN — GABAPENTIN 100 MG: 100 CAPSULE ORAL at 21:13

## 2023-07-29 RX ADMIN — SODIUM CHLORIDE, PRESERVATIVE FREE 10 ML: 5 INJECTION INTRAVENOUS at 21:16

## 2023-07-29 RX ADMIN — GABAPENTIN 100 MG: 100 CAPSULE ORAL at 08:39

## 2023-07-29 RX ADMIN — GABAPENTIN 100 MG: 100 CAPSULE ORAL at 15:35

## 2023-07-29 RX ADMIN — METOPROLOL SUCCINATE 100 MG: 100 TABLET, EXTENDED RELEASE ORAL at 08:38

## 2023-07-29 RX ADMIN — ENOXAPARIN SODIUM 40 MG: 100 INJECTION SUBCUTANEOUS at 08:39

## 2023-07-29 NOTE — PROGRESS NOTES
[address] [facility] Hospitalist Group  Progress Note    Patient: Ruthie Gresham Age: 58 y.o. : 1960 MR#: [id number]: [id number]  Date/Time: 2023     Subjective:     Patient seen and evaluated, sitting up in bed, no acute distress. Patient is status post surgery to right tibia and fibula for closed fracture. Patient has external fixators in place. Pt is being evaluated by ARU or SNF     - patient's  is okay with her going to ARU. Authorization started by ARU, will continue to follow    -continue PT OT, await input from rehab regarding insurance authorization. Continue current treatment plan. Discussed with  at bedside. 7/15-continue current treatment plan, awaiting insurance authorization for patient to go to acute rehab facility. -continue current treatment plan, [id number] advised by insurance company. We will follow. Continue current treatment plan     -continue current treatment plan. Anticipate discharge to acute rehab if accepted. -continue current treatment plan. Ortho note reviewed. Peer to peer was denied. Insurance company mentioned that we can appeal.  I have given the information to the . Discussed with  at bedside. -continue current treatment plan. Case management on board for discharge planning    -continue current treatment plan, case management board for discharge planning. Staged ORIF  per Ortho.    -continue current treatment plan. Staged ORIF . We will follow. Patient will likely not be discharged to skilled nursing facility since she requires authorization and the earliest possible discharge will be on  to return for surgery on . We will continue to monitor. -continue current treatment plan, staged ORIF .    -continue current treatment plan, staged ORIF .     - continue current plan. Staged ORIF on .  Patient upset as her degenerative. Correlate with point tenderness. Associated extensive soft tissue edema. Please see report for additional details. CT ANKLE RIGHT WO CONTRAST    Result Date: 7/11/2023  :  Oblique fracture distal tibia. Tiny ossific density inferior to medial malleolus that could be fracture fragment from the tip of the malleolus. No donor site seen. Lesion inferior to the lateral malleolus probably related to old injury, less likely acute avulsion. Small joint effusion. Soft tissue swelling around the ankle. XR CHEST PORTABLE    Result Date: 7/11/2023  No acute findings. Labs:    No results found for this or any previous visit (from the past 48 hour(s)). Signed By: Uriel Staton MD     July 29, 2023      I spent 35 minutes with the patient in face-to-face consultation, of which greater than 50% was spent in counseling and coordination of care as described above    Disclaimer: Sections of this note are dictated using utilizing voice recognition software. Minor typographical errors may be present. If questions arise, please do not hesitate to contact me or call our department.

## 2023-07-29 NOTE — PLAN OF CARE
Problem: Discharge Planning  Goal: Discharge to home or other facility with appropriate resources  Outcome: Progressing     Problem: Safety - Adult  Goal: Free from fall injury  Outcome: Progressing     Problem: ABCDS Injury Assessment  Goal: Absence of physical injury  Outcome: Progressing     Problem: Skin/Tissue Integrity  Goal: Absence of new skin breakdown  Description: 1. Monitor for areas of redness and/or skin breakdown  2. Assess vascular access sites hourly  3. Every 4-6 hours minimum:  Change oxygen saturation probe site  4. Every 4-6 hours:  If on nasal continuous positive airway pressure, respiratory therapy assess nares and determine need for appliance change or resting period. Outcome: Progressing     Problem: Occupational Therapy - Adult  Goal: By Discharge: Performs self-care activities at highest level of function for planned discharge setting. See evaluation for individualized goals. Description: Occupational Therapy Goals:   Re-evaluated 7/27/2023, goals #1 and 3  met and updated, goal #4 modified, continue with goals to be met within 7-10 days    1. Patient will perform lower body dressing with minimal assistance using AE prn.   2.  Patient will perform upper body dressing with modified independence. 3.  Patient will perform bathing with minimal assistance using AE/adaptive strategies prn.  4.  Patient will perform bedside commode transfers with minimal assistance/contact guard assist maintaining NWB. 5.  Patient will perform all aspects of toileting with minimal assistance/contact guard assist.  6.  Patient will participate in upper extremity therapeutic exercise/activities with supervision/set-up for 8-10 minutes to increase ROM/strength for ADLs. 7.  Patient will utilize energy conservation techniques during functional activities with verbal cues. PLOF: mPatient a poor historian.  Per chart review, pt received assist for ADLs.   7/28/2023 0911 by Josué Lechuga PRIETO  Outcome: Progressing     Problem: Confusion  Goal: Confusion, delirium, dementia, or psychosis is improved or at baseline  Description: INTERVENTIONS:  1. Assess for possible contributors to thought disturbance, including medications, impaired vision or hearing, underlying metabolic abnormalities, dehydration, psychiatric diagnoses, and notify attending LIP  2. [place] high risk fall precautions, as indicated  3. Provide frequent short contacts to provide reality reorientation, refocusing and direction  4. Decrease environmental stimuli, including noise as appropriate  5. Monitor and intervene to maintain adequate nutrition, hydration, elimination, sleep and activity  6. If unable to ensure safety without constant attention obtain sitter and review sitter guidelines with assigned personnel  7.  Initiate Psychosocial CNS and Spiritual Care consult, as indicated  Outcome: Progressing  Flowsheets (Taken 7/28/2023 1945)  Effect of thought disturbance (confusion, delirium, dementia, or psychosis) are managed with adequate functional status:   Assess for contributors to thought disturbance, including medications, impaired vision or hearing, underlying metabolic abnormalities, dehydration, psychiatric diagnoses, notify [id number] high risk fall precautions, as indicated     Problem: Nutrition Deficit:  Goal: Optimize nutritional status  Outcome: Progressing

## 2023-07-30 PROCEDURE — 6370000000 HC RX 637 (ALT 250 FOR IP): Performed by: PHYSICIAN ASSISTANT

## 2023-07-30 PROCEDURE — 1100000000 HC RM PRIVATE

## 2023-07-30 PROCEDURE — 6360000002 HC RX W HCPCS: Performed by: ORTHOPAEDIC SURGERY

## 2023-07-30 PROCEDURE — 99232 SBSQ HOSP IP/OBS MODERATE 35: CPT | Performed by: HOSPITALIST

## 2023-07-30 RX ADMIN — METOPROLOL SUCCINATE 100 MG: 100 TABLET, EXTENDED RELEASE ORAL at 10:12

## 2023-07-30 RX ADMIN — ATORVASTATIN CALCIUM 80 MG: 40 TABLET, FILM COATED ORAL at 20:21

## 2023-07-30 RX ADMIN — GABAPENTIN 300 MG: 100 CAPSULE ORAL at 20:32

## 2023-07-30 RX ADMIN — GABAPENTIN 100 MG: 100 CAPSULE ORAL at 15:34

## 2023-07-30 RX ADMIN — GABAPENTIN 100 MG: 100 CAPSULE ORAL at 10:12

## 2023-07-30 RX ADMIN — ENOXAPARIN SODIUM 40 MG: 100 INJECTION SUBCUTANEOUS at 10:13

## 2023-07-30 RX ADMIN — GABAPENTIN 100 MG: 100 CAPSULE ORAL at 20:32

## 2023-07-30 RX ADMIN — GABAPENTIN 300 MG: 100 CAPSULE ORAL at 10:12

## 2023-07-30 RX ADMIN — TERIFLUNOMIDE 14 MG: 14 TABLET, FILM COATED ORAL at 12:41

## 2023-07-30 NOTE — PLAN OF CARE
Problem: Discharge Planning  Goal: Discharge to home or other facility with appropriate resources  7/29/2023 2350 by Kenya Mckinley RN  Outcome: Progressing  7/29/2023 1322 by Sharie Paget, RN  Outcome: Progressing     Problem: Safety - Adult  Goal: Free from fall injury  7/29/2023 2350 by Kenya Mckinley RN  Outcome: Progressing  7/29/2023 1322 by Sharie Paget, RN  Outcome: Progressing     Problem: ABCDS Injury Assessment  Goal: Absence of physical injury  7/29/2023 2350 by Kenya Mckinley RN  Outcome: Progressing  7/29/2023 1322 by Sharie Paget, RN  Outcome: Progressing     Problem: Skin/Tissue Integrity  Goal: Absence of new skin breakdown  Description: 1. Monitor for areas of redness and/or skin breakdown  2. Assess vascular access sites hourly  3. Every 4-6 hours minimum:  Change oxygen saturation probe site  4. Every 4-6 hours:  If on nasal continuous positive airway pressure, respiratory therapy assess nares and determine need for appliance change or resting period. 7/29/2023 2350 by Kenya Mckinley RN  Outcome: Progressing  7/29/2023 1322 by Sharie Paget, RN  Outcome: Progressing     Problem: Confusion  Goal: Confusion, delirium, dementia, or psychosis is improved or at baseline  Description: INTERVENTIONS:  1. Assess for possible contributors to thought disturbance, including medications, impaired vision or hearing, underlying metabolic abnormalities, dehydration, psychiatric diagnoses, and notify attending LIP  2. [place] high risk fall precautions, as indicated  3. Provide frequent short contacts to provide reality reorientation, refocusing and direction  4. Decrease environmental stimuli, including noise as appropriate  5. Monitor and intervene to maintain adequate nutrition, hydration, elimination, sleep and activity  6. If unable to ensure safety without constant attention obtain sitter and review sitter guidelines with assigned personnel  7.  Initiate Psychosocial CNS and Spiritual Care consult, as indicated  7/29/2023 1322 by Penelope Coreas, RN  Outcome: Progressing     Problem: Nutrition Deficit:  Goal: Optimize nutritional status  7/29/2023 1322 by Penelope Coreas RN  Outcome: Progressing

## 2023-07-30 NOTE — PROGRESS NOTES
medications    Medication Sig Start Date End Date Taking? Authorizing Provider   Teriflunomide 14 MG TABS Take 14 mg by mouth daily   Yes Historical Provider, MD   chlorthalidone (HYGROTON) 25 MG tablet Take 1 tablet by mouth daily   Yes Historical Provider, MD   gabapentin (NEURONTIN) 300 MG capsule Take 1 capsule by mouth in the morning and at bedtime. Max Daily Amount: 600 mg   Yes Historical Provider, MD   metoprolol succinate (TOPROL XL) 100 MG extended release tablet Take 1 tablet by mouth daily   Yes Historical Provider, MD   vitamin D 25 MCG (1000 UT) CAPS Take by mouth   Yes Historical Provider, MD   atorvastatin (LIPITOR) 80 MG tablet Take 1 tablet by mouth at bedtime    Ar Automatic Reconciliation   diclofenac sodium (VOLTAREN) 1 % GEL APPLY TWO GRAMS TOPICALLY TO AFFECTED AREA(S) THREE TIMES A DAY 1/29/21   Ar Automatic Reconciliation   gabapentin (NEURONTIN) 100 MG capsule Take 1 capsule by mouth 3 times daily. Ar Automatic Reconciliation   oxyCODONE (ROXICODONE) 5 MG immediate release tablet Take 10 mg by mouth every 4 hours as needed.  9/4/15   Ar Automatic Reconciliation     Current Facility-Administered Medications   Medication Dose Route Frequency    sodium chloride flush 0.9 % injection 5-40 mL  5-40 mL IntraVENous 2 times per day    sodium chloride flush 0.9 % injection 5-40 mL  5-40 mL IntraVENous PRN    0.9 % sodium chloride infusion   IntraVENous PRN    ondansetron (ZOFRAN-ODT) disintegrating tablet 4 mg  4 mg Oral Q8H PRN    Or    ondansetron (ZOFRAN) injection 4 mg  4 mg IntraVENous Q6H PRN    enoxaparin (LOVENOX) injection 40 mg  40 mg SubCUTAneous Daily    sodium chloride flush 0.9 % injection 5-40 mL  5-40 mL IntraVENous PRN    polyethylene glycol (GLYCOLAX) packet 17 g  17 g Oral Daily PRN    bisacodyl (DULCOLAX) suppository 10 mg  10 mg Rectal Daily PRN    acetaminophen (TYLENOL) tablet 650 mg  650 mg Oral Q6H PRN    Or    acetaminophen (TYLENOL) suppository 650 mg  650 mg Rectal Q6H PRN    morphine (PF) injection 2 mg  2 mg IntraVENous Q4H PRN    atorvastatin (LIPITOR) tablet 80 mg  80 mg Oral Nightly    gabapentin (NEURONTIN) capsule 100 mg  100 mg Oral TID    gabapentin (NEURONTIN) capsule 300 mg  300 mg Oral BID    metoprolol succinate (TOPROL XL) extended release tablet 100 mg  100 mg Oral Daily    Teriflunomide TABS 14 mg (Patient Supplied)  14 mg Oral Daily    sodium chloride flush 0.9 % injection 5-40 mL  5-40 mL IntraVENous PRN     No Known Allergies            Malu Guerrero MD  7/31/2023  6:51 AM

## 2023-07-30 NOTE — DISCHARGE INSTRUCTIONS
ORTHOPAEDIC DISCHARGE PLAN     1. DISCHARGE DISPOSITION:  [address] [facility] ([facility])    2. FOLLOW UP RETURN TO OFFICE: 1 weeks    Call [phone] to confirm your appointment to see Dr. Mario Florian. Tamera Salinas MD.   Follow up with Primary Care Provider in 7 to 10 days. 3. WEIGHT BEARING STATUS/ROM:    NO WEIGHT BEARING TO to the Right LOWER EXTREMITY    4. ELEVATE the Right lower extremity on 1 pillow. Place the pillow horizontal so that no pressure is on the back of your heel    Please leave your current dressings and in place. Keep your dressings clean and dry to the: Right lower extremity      Please call [phone] ([address]) if any: fever, shakes, chills, intractable pain, or for any questions you have regarding your care/medical condition   1. If you experience any calf pain or swelling, or are having any shortness of  breath, chest pain, or extremity swelling, or bleeding thru any surgical dressings,  or Bleeding at any body location while you are taking on any blood thinners. ie (mouth,nose, skin sites:)   2. Please go to closest ER  [facility] for assessment to rule out a leg clot and to  assess any  bleeding. 3. After general anesthesia or intravenous sedation, for 24 hours or while taking  prescription Narcotics:      [x]  Limit your activities     [x]   Do not drive and operate hazardous machinery    [x]   Do not make important personal or business decisions    [x]   Do  not drink alcoholic beverages    [x]  If you have not urinated within 8 hours after discharge, please contact    your surgeon on call.      Report the following to your surgeon: If any below is true         [x]   Excessive pain, swelling, redness or odor of or around the surgical area       [x]   Temperature over 100.5       [x]  Nausea and vomiting lasting longer than 4 hours or if unable to take medications       [x]    Any signs of decreased circulation or nerve impairment to extremity:    Change in color,

## 2023-07-31 LAB
ERYTHROCYTE [DISTWIDTH] IN BLOOD BY AUTOMATED COUNT: 15.8 % (ref 11.6–14.5)
HCT VFR BLD AUTO: 35.2 % (ref 35–45)
HGB BLD-MCNC: 10.8 G/DL (ref 12–16)
MCH RBC QN AUTO: 22.2 PG (ref 24–34)
MCHC RBC AUTO-ENTMCNC: 30.7 G/DL (ref 31–37)
MCV RBC AUTO: 72.3 FL (ref 78–100)
NRBC # BLD: 0 K/UL (ref 0–0.01)
NRBC BLD-RTO: 0 PER 100 WBC
PLATELET # BLD AUTO: 274 K/UL (ref 135–420)
PMV BLD AUTO: 10.9 FL (ref 9.2–11.8)
RBC # BLD AUTO: 4.87 M/UL (ref 4.2–5.3)
WBC # BLD AUTO: 6.8 K/UL (ref 4.6–13.2)

## 2023-07-31 PROCEDURE — 1100000000 HC RM PRIVATE

## 2023-07-31 PROCEDURE — 99232 SBSQ HOSP IP/OBS MODERATE 35: CPT | Performed by: HOSPITALIST

## 2023-07-31 PROCEDURE — 85027 COMPLETE CBC AUTOMATED: CPT

## 2023-07-31 PROCEDURE — 97112 NEUROMUSCULAR REEDUCATION: CPT

## 2023-07-31 PROCEDURE — 94761 N-INVAS EAR/PLS OXIMETRY MLT: CPT

## 2023-07-31 PROCEDURE — 6370000000 HC RX 637 (ALT 250 FOR IP): Performed by: PHYSICIAN ASSISTANT

## 2023-07-31 PROCEDURE — 97535 SELF CARE MNGMENT TRAINING: CPT

## 2023-07-31 PROCEDURE — 36415 COLL VENOUS BLD VENIPUNCTURE: CPT

## 2023-07-31 PROCEDURE — 97530 THERAPEUTIC ACTIVITIES: CPT

## 2023-07-31 PROCEDURE — 99024 POSTOP FOLLOW-UP VISIT: CPT | Performed by: ORTHOPAEDIC SURGERY

## 2023-07-31 PROCEDURE — 6360000002 HC RX W HCPCS: Performed by: ORTHOPAEDIC SURGERY

## 2023-07-31 PROCEDURE — 2580000003 HC RX 258: Performed by: ORTHOPAEDIC SURGERY

## 2023-07-31 RX ADMIN — SODIUM CHLORIDE, PRESERVATIVE FREE 10 ML: 5 INJECTION INTRAVENOUS at 08:18

## 2023-07-31 RX ADMIN — GABAPENTIN 300 MG: 100 CAPSULE ORAL at 08:16

## 2023-07-31 RX ADMIN — GABAPENTIN 100 MG: 100 CAPSULE ORAL at 20:31

## 2023-07-31 RX ADMIN — METOPROLOL SUCCINATE 100 MG: 100 TABLET, EXTENDED RELEASE ORAL at 08:17

## 2023-07-31 RX ADMIN — GABAPENTIN 300 MG: 100 CAPSULE ORAL at 20:32

## 2023-07-31 RX ADMIN — TERIFLUNOMIDE 14 MG: 14 TABLET, FILM COATED ORAL at 08:18

## 2023-07-31 RX ADMIN — GABAPENTIN 100 MG: 100 CAPSULE ORAL at 14:07

## 2023-07-31 RX ADMIN — GABAPENTIN 100 MG: 100 CAPSULE ORAL at 08:18

## 2023-07-31 RX ADMIN — SODIUM CHLORIDE, PRESERVATIVE FREE 10 ML: 5 INJECTION INTRAVENOUS at 20:33

## 2023-07-31 RX ADMIN — ENOXAPARIN SODIUM 40 MG: 100 INJECTION SUBCUTANEOUS at 08:17

## 2023-07-31 RX ADMIN — ATORVASTATIN CALCIUM 80 MG: 40 TABLET, FILM COATED ORAL at 20:31

## 2023-07-31 ASSESSMENT — PAIN SCALES - GENERAL
PAINLEVEL_OUTOF10: 0

## 2023-07-31 NOTE — PLAN OF CARE
through right LE. Min A to manage right leg for supine to sit transfer. CG/min A for sitting balance due to right lateral lean. Worked on dynamic sitting balance reaching tasks to improve balance and core strength. Patient practiced lateral scooting along EOB with min A holding right foot off the ground. She fatigues quickly and returns to supine. Patient left resting comfortably with all needs in reach. Progression toward goals:   []      Improving appropriately and progressing toward goals  [x]      Improving slowly and progressing toward goals  []      Not making progress toward goals and plan of care will be adjusted     PLAN:  Patient continues to benefit from skilled intervention to address the above impairments. Continue treatment per established plan of care. Further Equipment Recommendations for Discharge: wheel chair, slide board    AMPAC:    14      Current research shows that an AM-PAC score of 17 (13 without stairs) or less is not associated with a discharge to the patient's home setting. Based on an AM-PAC score and their current functional mobility deficits, it is recommended that the patient have 3-5 sessions per week of Physical Therapy at d/c to increase the patient's independence. This AMPAC score should be considered in conjunction with interdisciplinary team recommendations to determine the most appropriate discharge setting. Patient's social support, diagnosis, medical stability, and prior level of function should also be taken into consideration. SUBJECTIVE:   Patient stated, \"It is so heavy[ right leg]. \"    OBJECTIVE DATA SUMMARY:       Functional Mobility Training:  Bed Mobility:  Bed Mobility Training  Bed Mobility Training: Yes  Supine to Sit: Minimum assistance  Sit to Supine: Minimum assistance  Scooting: Minimum assistance    Transfers:  Transfer Training  Transfer Training: No    Balance:  Balance  Sitting: Impaired  Sitting - Static: Fair (occasional) (+)  Sitting - Dynamic: Fair (occasional)           Pain:  Pain level pre-treatment: -/10  Pain level post-treatment: -/10   Pain Intervention(s): Rest, Ice, Repositioning   Response to intervention: Nurse notified    Activity Tolerance:   Activity Tolerance: Patient limited by fatigue  Please refer to the flowsheet for vital signs taken during this treatment.   After treatment:   [] Patient left in no apparent distress sitting up in chair  [x] Patient left in no apparent distress in bed  [x] Call bell left within reach  [x] Nursing notified  [] Caregiver present  [x] Bed alarm activated  [] SCDs applied      COMMUNICATION/EDUCATION:   Patient Education  Education Given To: Patient  Education Provided: Role of Therapy;Transfer Training;Plan of Care  Education Method: Verbal;Teach Back  Barriers to Learning: Cognition  Education Outcome: Continued education needed;Verbalized understanding      Elliott Dennis PT  Minutes: 23

## 2023-07-31 NOTE — PLAN OF CARE
Problem: Discharge Planning  Goal: Discharge to home or other facility with appropriate resources  Outcome: Progressing     Problem: Safety - Adult  Goal: Free from fall injury  Outcome: Progressing     Problem: ABCDS Injury Assessment  Goal: Absence of physical injury  Outcome: Progressing     Problem: Skin/Tissue Integrity  Goal: Absence of new skin breakdown  Description: 1. Monitor for areas of redness and/or skin breakdown  2. Assess vascular access sites hourly  3. Every 4-6 hours minimum:  Change oxygen saturation probe site  4. Every 4-6 hours:  If on nasal continuous positive airway pressure, respiratory therapy assess nares and determine need for appliance change or resting period. Outcome: Progressing     Problem: Confusion  Goal: Confusion, delirium, dementia, or psychosis is improved or at baseline  Description: INTERVENTIONS:  1. Assess for possible contributors to thought disturbance, including medications, impaired vision or hearing, underlying metabolic abnormalities, dehydration, psychiatric diagnoses, and notify attending LIP  2. [place] high risk fall precautions, as indicated  3. Provide frequent short contacts to provide reality reorientation, refocusing and direction  4. Decrease environmental stimuli, including noise as appropriate  5. Monitor and intervene to maintain adequate nutrition, hydration, elimination, sleep and activity  6. If unable to ensure safety without constant attention obtain sitter and review sitter guidelines with assigned personnel  7.  Initiate Psychosocial CNS and Spiritual Care consult, as indicated  Outcome: Progressing     Problem: Nutrition Deficit:  Goal: Optimize nutritional status  Outcome: Progressing

## 2023-07-31 NOTE — PLAN OF CARE
Problem: Occupational Therapy - Adult  Goal: By Discharge: Performs self-care activities at highest level of function for planned discharge setting. See evaluation for individualized goals. Description: Occupational Therapy Goals:   Re-evaluated 7/27/2023, goals #1 and 3  met and updated, goal #4 modified, continue with goals to be met within 7-10 days    1. Patient will perform lower body dressing with minimal assistance using AE prn.   2.  Patient will perform upper body dressing with modified independence. 3.  Patient will perform bathing with minimal assistance using AE/adaptive strategies prn.  4.  Patient will perform bedside commode transfers with minimal assistance/contact guard assist maintaining NWB. 5.  Patient will perform all aspects of toileting with minimal assistance/contact guard assist.  6.  Patient will participate in upper extremity therapeutic exercise/activities with supervision/set-up for 8-10 minutes to increase ROM/strength for ADLs. 7.  Patient will utilize energy conservation techniques during functional activities with verbal cues. PLOF: mPatient a poor historian. Per chart review, pt received assist for ADLs.    Outcome: Progressing   OCCUPATIONAL THERAPY TREATMENT    Patient: Amna Guy (23 y.o. 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

## 2023-07-31 NOTE — PLAN OF CARE
Problem: Discharge Planning  Goal: Discharge to home or other facility with appropriate resources  7/30/2023 2228 by Carmelita Knight RN  Outcome: Progressing     Problem: Safety - Adult  Goal: Free from fall injury  7/31/2023 0927 by João Anne RN  Outcome: Progressing  7/30/2023 2228 by Carmelita Knight RN  Outcome: Progressing     Problem: ABCDS Injury Assessment  Goal: Absence of physical injury  7/31/2023 0927 by João Anne RN  Outcome: Progressing  7/30/2023 2228 by Carmelita Knight RN  Outcome: Progressing     Problem: Skin/Tissue Integrity  Goal: Absence of new skin breakdown  Description: 1. Monitor for areas of redness and/or skin breakdown  2. Assess vascular access sites hourly  3. Every 4-6 hours minimum:  Change oxygen saturation probe site  4. Every 4-6 hours:  If on nasal continuous positive airway pressure, respiratory therapy assess nares and determine need for appliance change or resting period. 7/31/2023 6253 by João Anne RN  Outcome: Progressing  7/30/2023 2228 by Carmelita Knight RN  Outcome: Progressing     Problem: Confusion  Goal: Confusion, delirium, dementia, or psychosis is improved or at baseline  Description: INTERVENTIONS:  1. Assess for possible contributors to thought disturbance, including medications, impaired vision or hearing, underlying metabolic abnormalities, dehydration, psychiatric diagnoses, and notify attending LIP  2. Cut Off high risk fall precautions, as indicated  3. Provide frequent short contacts to provide reality reorientation, refocusing and direction  4. Decrease environmental stimuli, including noise as appropriate  5. Monitor and intervene to maintain adequate nutrition, hydration, elimination, sleep and activity  6. If unable to ensure safety without constant attention obtain sitter and review sitter guidelines with assigned personnel  7.  Initiate Psychosocial CNS and Spiritual Care consult, as indicated  7/31/2023 0927 by Harriet Quinteros RN  Outcome: Progressing  7/30/2023 2228 by Mc Javed RN  Outcome: Progressing     Problem: Nutrition Deficit:  Goal: Optimize nutritional status  7/30/2023 2228 by Mc Javed RN  Outcome: Progressing

## 2023-07-31 NOTE — PROGRESS NOTES
[facility]  Progress Note    Patient: Xiomara Becker Age: 58 y.o. : 1960 MR#: [id number] SSN: [id number]  Date/Time: 2023     Subjective:     Patient seen and evaluated, sitting up in bed, no acute distress. Patient is status post surgery to right tibia and fibula for closed fracture. Patient has external fixators in place. Pt is being evaluated by ARU or SNF     - patient's  is okay with her going to ARU. Authorization started by ARU, will continue to follow    -continue PT OT, await input from rehab regarding insurance authorization. Continue current treatment plan. Discussed with  at bedside. 7/15-continue current treatment plan, awaiting insurance authorization for patient to go to acute rehab facility. -continue current treatment plan, [id number] advised by insurance company. We will follow. Continue current treatment plan     -continue current treatment plan. Anticipate discharge to acute rehab if accepted. -continue current treatment plan. Ortho note reviewed. Peer to peer was denied. Insurance company mentioned that we can appeal.  I have given the information to the . Discussed with  at bedside. -continue current treatment plan. Case management on board for discharge planning    -continue current treatment plan, case management board for discharge planning. Staged ORIF  per Ortho.    -continue current treatment plan. Staged ORIF . We will follow. Patient will likely not be discharged to skilled nursing facility since she requires authorization and the earliest possible discharge will be on  to return for surgery on . We will continue to monitor. -continue current treatment plan, staged ORIF .    -continue current treatment plan, staged ORIF .     - continue current plan. Staged ORIF on .  Patient upset as her

## 2023-08-01 PROCEDURE — 97110 THERAPEUTIC EXERCISES: CPT

## 2023-08-01 PROCEDURE — 2580000003 HC RX 258: Performed by: ORTHOPAEDIC SURGERY

## 2023-08-01 PROCEDURE — 99024 POSTOP FOLLOW-UP VISIT: CPT | Performed by: ORTHOPAEDIC SURGERY

## 2023-08-01 PROCEDURE — 94761 N-INVAS EAR/PLS OXIMETRY MLT: CPT

## 2023-08-01 PROCEDURE — 99231 SBSQ HOSP IP/OBS SF/LOW 25: CPT | Performed by: INTERNAL MEDICINE

## 2023-08-01 PROCEDURE — 6360000002 HC RX W HCPCS: Performed by: ORTHOPAEDIC SURGERY

## 2023-08-01 PROCEDURE — 97530 THERAPEUTIC ACTIVITIES: CPT

## 2023-08-01 PROCEDURE — 6370000000 HC RX 637 (ALT 250 FOR IP): Performed by: PHYSICIAN ASSISTANT

## 2023-08-01 PROCEDURE — 1100000000 HC RM PRIVATE

## 2023-08-01 RX ADMIN — GABAPENTIN 300 MG: 100 CAPSULE ORAL at 20:32

## 2023-08-01 RX ADMIN — GABAPENTIN 300 MG: 100 CAPSULE ORAL at 09:00

## 2023-08-01 RX ADMIN — TERIFLUNOMIDE 14 MG: 14 TABLET, FILM COATED ORAL at 08:02

## 2023-08-01 RX ADMIN — GABAPENTIN 100 MG: 100 CAPSULE ORAL at 20:32

## 2023-08-01 RX ADMIN — SODIUM CHLORIDE, PRESERVATIVE FREE 10 ML: 5 INJECTION INTRAVENOUS at 20:34

## 2023-08-01 RX ADMIN — GABAPENTIN 100 MG: 100 CAPSULE ORAL at 09:00

## 2023-08-01 RX ADMIN — GABAPENTIN 100 MG: 100 CAPSULE ORAL at 13:30

## 2023-08-01 RX ADMIN — SODIUM CHLORIDE, PRESERVATIVE FREE 10 ML: 5 INJECTION INTRAVENOUS at 08:02

## 2023-08-01 RX ADMIN — ENOXAPARIN SODIUM 40 MG: 100 INJECTION SUBCUTANEOUS at 09:00

## 2023-08-01 RX ADMIN — ATORVASTATIN CALCIUM 80 MG: 40 TABLET, FILM COATED ORAL at 20:33

## 2023-08-01 RX ADMIN — METOPROLOL SUCCINATE 100 MG: 100 TABLET, EXTENDED RELEASE ORAL at 08:01

## 2023-08-01 ASSESSMENT — PAIN SCALES - GENERAL: PAINLEVEL_OUTOF10: 0

## 2023-08-01 ASSESSMENT — PAIN SCALES - WONG BAKER: WONGBAKER_NUMERICALRESPONSE: 0

## 2023-08-01 NOTE — PLAN OF CARE
Problem: Occupational Therapy - Adult  Goal: By Discharge: Performs self-care activities at highest level of function for planned discharge setting. See evaluation for individualized goals. Description: Occupational Therapy Goals:   Re-evaluated 7/27/2023, goals #1 and 3  met and updated, goal #4 modified, continue with goals to be met within 7-10 days    1. Patient will perform lower body dressing with minimal assistance using AE prn.   2.  Patient will perform upper body dressing with modified independence. 3.  Patient will perform bathing with minimal assistance using AE/adaptive strategies prn.  4.  Patient will perform bedside commode transfers with minimal assistance/contact guard assist maintaining NWB. 5.  Patient will perform all aspects of toileting with minimal assistance/contact guard assist.  6.  Patient will participate in upper extremity therapeutic exercise/activities with supervision/set-up for 8-10 minutes to increase ROM/strength for ADLs. 7.  Patient will utilize energy conservation techniques during functional activities with verbal cues. PLOF: mPatient a poor historian. Per chart review, pt received assist for ADLs.    Outcome: Progressing    OCCUPATIONAL THERAPY TREATMENT    Patient: Ruthie Gresham (63 y.o. 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

## 2023-08-01 NOTE — PROGRESS NOTES
Comprehensive Nutrition Assessment    Type and Reason for Visit:  Reassess    Nutrition Recommendations/Plan:   Plan to modify current diet to remove carbohydrate restriction- no h/o diabetes. Continue to monitor PO intake, weight, labs, and POC while admitted. 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 LOS. Pt s/p closed reduction right tibia & fibia; large external fixation 7/11/23. Per flow sheets, pt with intake >50%, 15/17 entries >75% intake, since previous nutrition assessment. Visited pt- endorsed good meal intake and appetite. Tolerating diet well. Noted Pt with 3 CCHO diet started 7/26 following removal of fixation and placement of external bone stimulation 7/26. No h/o diabetes, plan to remove restriction. Per case management notes, pt awaiting authorization for SNF from [address]. Nutrition Related Findings:    Last BM: 7/30. Edema: non-pitting edema noted to RLE. Labs (7/27): reviewed. Pertinent meds: reviewed. Wound Type: Surgical Incision       Current Nutrition Intake & Therapies:    Average Meal Intake: %  Average Supplements Intake: None Ordered  ADULT DIET; Regular; 3 carb choices (45 gm/meal)    Anthropometric Measures:  Height: 5' 3\" (160 cm)  Ideal Body Weight (IBW): 115 lbs (52 kg)    Admission Body Weight: 178 lb (80.7 kg) (stated)  Current Body Weight: 189 lb (85.7 kg), 164.3 % IBW. Weight Source: Bed Scale  Current BMI (kg/m2): 33.5  Usual Body Weight: 185 lb (83.9 kg)  % Weight Change (Calculated): 2.2  Weight Adjustment For: No Adjustment  BMI Categories: Obese Class 1 (BMI 30.0-34. 9)    Estimated Daily Nutrient Needs:  Energy Requirements Based On: Formula  Weight Used for Energy Requirements: Current  Energy (kcal/day): 5498-1940 (1-1.2)  Weight Used for Protein

## 2023-08-02 VITALS
HEART RATE: 69 BPM | BODY MASS INDEX: 31.54 KG/M2 | DIASTOLIC BLOOD PRESSURE: 81 MMHG | OXYGEN SATURATION: 99 % | SYSTOLIC BLOOD PRESSURE: 127 MMHG | WEIGHT: 178 LBS | RESPIRATION RATE: 18 BRPM | TEMPERATURE: 98.7 F | HEIGHT: 63 IN

## 2023-08-02 PROCEDURE — 97168 OT RE-EVAL EST PLAN CARE: CPT

## 2023-08-02 PROCEDURE — 97530 THERAPEUTIC ACTIVITIES: CPT

## 2023-08-02 PROCEDURE — 99024 POSTOP FOLLOW-UP VISIT: CPT | Performed by: ORTHOPAEDIC SURGERY

## 2023-08-02 PROCEDURE — 97535 SELF CARE MNGMENT TRAINING: CPT

## 2023-08-02 PROCEDURE — 6360000002 HC RX W HCPCS: Performed by: ORTHOPAEDIC SURGERY

## 2023-08-02 PROCEDURE — 2580000003 HC RX 258: Performed by: ORTHOPAEDIC SURGERY

## 2023-08-02 PROCEDURE — 6370000000 HC RX 637 (ALT 250 FOR IP): Performed by: PHYSICIAN ASSISTANT

## 2023-08-02 PROCEDURE — 99239 HOSP IP/OBS DSCHRG MGMT >30: CPT | Performed by: INTERNAL MEDICINE

## 2023-08-02 RX ORDER — OXYCODONE HYDROCHLORIDE 5 MG/1
10 TABLET ORAL EVERY 4 HOURS PRN
Qty: 12 TABLET | Refills: 0 | Status: SHIPPED | OUTPATIENT
Start: 2023-08-02 | End: 2023-08-09

## 2023-08-02 RX ORDER — ASPIRIN 325 MG
325 TABLET ORAL DAILY
Qty: 30 TABLET | Refills: 3 | Status: SHIPPED | DISCHARGE
Start: 2023-08-12

## 2023-08-02 RX ORDER — GABAPENTIN 300 MG/1
300 CAPSULE ORAL 2 TIMES DAILY
Qty: 60 CAPSULE | Refills: 0 | Status: SHIPPED | OUTPATIENT
Start: 2023-08-02 | End: 2023-09-01

## 2023-08-02 RX ORDER — ENOXAPARIN SODIUM 100 MG/ML
40 INJECTION SUBCUTANEOUS DAILY
Status: SHIPPED | DISCHARGE
Start: 2023-08-03 | End: 2023-08-13

## 2023-08-02 RX ADMIN — GABAPENTIN 100 MG: 100 CAPSULE ORAL at 08:30

## 2023-08-02 RX ADMIN — METOPROLOL SUCCINATE 100 MG: 100 TABLET, EXTENDED RELEASE ORAL at 08:29

## 2023-08-02 RX ADMIN — ENOXAPARIN SODIUM 40 MG: 100 INJECTION SUBCUTANEOUS at 09:00

## 2023-08-02 RX ADMIN — TERIFLUNOMIDE 14 MG: 14 TABLET, FILM COATED ORAL at 08:30

## 2023-08-02 RX ADMIN — GABAPENTIN 300 MG: 100 CAPSULE ORAL at 09:00

## 2023-08-02 RX ADMIN — SODIUM CHLORIDE, PRESERVATIVE FREE 10 ML: 5 INJECTION INTRAVENOUS at 08:30

## 2023-08-02 RX ADMIN — GABAPENTIN 100 MG: 100 CAPSULE ORAL at 13:30

## 2023-08-02 ASSESSMENT — PAIN SCALES - WONG BAKER: WONGBAKER_NUMERICALRESPONSE: 0

## 2023-08-02 ASSESSMENT — PAIN SCALES - GENERAL: PAINLEVEL_OUTOF10: 0

## 2023-08-02 NOTE — CARE COORDINATION
[place] approved for [address]. HARMONY notified  and transport is arranged.      Mickie Tang MSW  Care Management

## 2023-08-02 NOTE — PLAN OF CARE
Problem: Occupational Therapy - Adult  Goal: By Discharge: Performs self-care activities at highest level of function for planned discharge setting. See evaluation for individualized goals. Description: Occupational Therapy Goals:   Re-evaluated 8/2/2023, pt is gradually progressing goals, continue with goals to be met within 7-10 days    1. Patient will perform lower body dressing with minimal assistance using AE prn.   2.  Patient will perform upper body dressing with modified independence. 3.  Patient will perform bathing with minimal assistance using AE/adaptive strategies prn.  4.  Patient will perform bedside commode transfers with minimal assistance/contact guard assist maintaining NWB. 5.  Patient will perform all aspects of toileting with minimal assistance/contact guard assist.  6.  Patient will participate in upper extremity therapeutic exercise/activities with supervision/set-up for 8-10 minutes to increase ROM/strength for ADLs. 7.  Patient will utilize energy conservation techniques during functional activities with verbal cues. PLOF: Patient a poor historian. Per chart review, pt received assist for ADLs.    Outcome: Progressing  OCCUPATIONAL THERAPY RE-EVALUATION    Patient: Amna Guy (23 y.o. 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 [S82.301K]  Closed fracture of proximal end of right fibula, unspecified fracture morphology, initial encounter [S82.831A]  Closed fracture of distal end of right tibia, unspecified fracture morphology, initial encounter [S82.301A]  Procedure(s) (LRB):  REMOVAL OF RIGHT EXTERNAL FIXATION, RIGHT TIBIA/FIBULA OPEN REDUCTION INTERNAL FIXATION; POSSIBLE ALLOGRAFT BONE GRAFTING; REGIONAL NERVE BLOCK; SYNTHES TIBIA PLATES; VIVIGEN ALLOGRAFT; C-ARM (Right) 7 Days Post-Op   Precautions: Weight Bearing, Fall Risk, Right Lower Extremity Weight Bearing: Non Static: Fair (occasional)  Sitting - Dynamic: Fair (occasional)    Strength: BUE  Strength: Generally decreased, functional    Tone & Sensation: BUE  Tone: Normal  Sensation: Intact    Range of Motion: BUE  AROM: Generally decreased, functional       Functional Mobility and Transfers for ADLs:  Bed Mobility:     Bed Mobility Training  Bed Mobility Training: Yes  Supine to Sit: Contact-guard assistance; Additional time  Scooting: Minimum assistance  Transfers:   Transfer Training  Transfer Training: Yes  Bed to Chair: Minimum assistance  Sliding Board: Minimum assistance    ADL Assessment:      Feeding: Modified independent   Grooming: Supervision  UE Bathing: Minimal assistance  LE Bathing: Maximum assistance  UE Dressing: Minimal assistance  LE Dressing: Maximum assistance  Toileting: Moderate assistance    ADL Intervention:  Seated morning ADLs:  SBA for grooming due to decreased sitting balance  UB bathing/dressing with Min A and VCs for safety   LB bathing in sitting, leaning back for breonna-care with Mod A    Pain:  Pain level pre-treatment: not rated  Pain level post-treatment: not rated    Activity Tolerance:   Activity Tolerance: Patient Tolerated treatment well  Please refer to the flowsheet for vital signs taken during this treatment. After treatment:   [x] Patient left in no apparent distress sitting up in chair  [] Patient left in no apparent distress in bed  [x] Call bell left within reach  [x] Nursing notified  [] Caregiver present  [] Bed alarm activated    COMMUNICATION/EDUCATION:   Patient Education  Education Given To: Patient  Education Provided: Role of Therapy;Plan of Care;Energy Conservation;Precautions; ADL Adaptive Strategies  Education Method: Teach Back;Verbal;Demonstration  Barriers to Learning: Cognition  Education Outcome: Continued education needed    Thank you for this referral.  Cal Baugh OTR/L  Minutes: 45

## 2023-08-02 NOTE — DISCHARGE SUMMARY
[facility]       Patient: Saravanan Ortiz Age: 58 y.o. : 1960 MR#: [id number] SSN: [id number]  PCP on record: Stefanie Patterson DO  Admit date: 2023  Discharge date: 2023    Consults:  GUANAKITO Schroeder,-orthopedic surgeon  -   Procedures: on 23: REMOVAL OF RIGHT EXTERNAL FIXATION, RIGHT TIBIA/FIBULA OPEN REDUCTION INTERNAL FIXATION; POSSIBLE ALLOGRAFT BONE GRAFTING; REGIONAL NERVE BLOCK; SYNTHES TIBIA PLATES; VIVIGEN ALLOGRAFT; C-ARM  - On 23  CLOSED REDUCTION RIGHT TIBIA AND FIBIA; LARGE EXTERNAL FIXATION; SYNTHES; *NEEDS KICKSTAND*  Significant Diagnostic Studies: -CT Result (most recent):  CT ANKLE RIGHT WO CONTRAST 2023    Narrative  CPT 59762    CT lower extremity    Clinical history: Deformity after falling down. Technique: 7.42DZ helical CT of lower extremity. Sagittal and coronal  reformations created from original data set. All CT scans at this facility are  performed using dose optimization techniques as appropriate to a performed exam,  to include automated exposure control, adjustment of the mA and/or kV according  to patient's size (including appropriate matching for site specific  examinations), or use of iterative reconstruction technique. Comparison: No priors    Findings:    Bones: There is oblique fracture through the distal tibia with mild medial  angulation. No posterior lip fracture. There is a tiny ossific density inferior  to medial malleolus (coronal 36) without donor site. There is a corticated density inferior to the tip of the fibula. Mild  hypertrophic changes at the adjacent talus. No widening of the ankle mortise. Hypertrophic changes at the talonavicular joint. Soft tissues: Soft tissue swelling around the ankle greatest medially. Small  joint effusion. Impression  :  Oblique fracture distal tibia.     Tiny ossific density inferior to medial malleolus that could be

## 2023-08-02 NOTE — CARE COORDINATION
Requested Case Management specialist to assist with transportation to [address] . Address is [facility]   and phone number is [phone]  Patient will require BLS transport. Pt requires Stretcher If stretcher, reason: right tibula fracture, non weight bearing, MS, TBI with memory loss    Patient is currently requiring oxygen no   Height:5 3    Weight: 178  Pt is on isolation:  no    Is the pt ready now? no  Requested time: 1:00 pm   PCS Faxed: no  Insurance verified on face sheet: yes  Auth needed for transport: no  CM completed PCS/ Envelope and placed on chart.      MATT Lepe  Care Management

## 2023-08-02 NOTE — CARE COORDINATION
Transition of Care Plan to SNF/Rehab    SNF/Rehab Transition:  Patient has been accepted to [address] and meets criteria for admission. Patient will transported by [address]  and expected to leave at 1:30 PM .    Communication to Patient/Family:  Met with patient and notified  (identified care giver) and they are agreeable to the transition plan. Communication to SNF/Rehab:  Bedside RN, Chayo Delaney , has been notified to update the transition plan to the facility and call report (phone number [phone]). Discharge information has been updated on the AVS.       Nursing Please include all hard scripts for controlled substances, med rec and dc summary, and AVS in packet. Reviewed and confirmed with facility, [address], can manage the patient care needs for the following:     Kayden with (X) only those applicable:    Medication:  [x]  Medications will be available at the facility  []  IV Antibiotics   []  Controlled Substance - hard copy to be sent with patient   []  Weekly Labs   Documents:  [] Hard RX  [] MAR  [] Kardex  [] AVS  []Transfer Summary  [x]Discharge   Equipment:  []  CPAP/BiPAP  []  Wound Vacuum  []  [place] or Urinary Device  []  PICC/Central Line  []  Nebulizer  []  Ventilator   Treatment:  []Isolation (for MRSA, VRE, etc.)  []Surgical Drain Management  []Tracheostomy Care  []Dressing Changes  []Dialysis with transportation and chair time  []PEG Care  []Oxygen  []Daily Weights for Heart Failure   Dietary:  []Any diet limitations  []Tube Feedings   []Total Parenteral Management (TPN)   Eligible for Medicaid Long Term Services and Supports  Yes:  [] Eligible for medical assistance or will become eligible within 180 days and UAI completed. [] Provider/Patient and/or support system has requested screening. [] UAI copy provided to patient or responsible party,   [] UAI unavailable at discharge will send once processed to SNF provider.   [] UAI unavailable at discharged mailed to patient  No:   [] Private pay and is not financially eligible for Medicaid within the next 180 days. [] Reside out-of-state.   [] A residents of a state owned/operated facility that is licensed  by [address] or [facility]  [] Enrollment in [place] hospice services  [] [address]  [] Patient /Family declines to have screening completed or provide financial information for screening     Financial Resources:  Medicaid    [] Initiated and application pending   [] Full coverage     Advanced Care Plan:  []Surrogate Decision Maker of Care  []POA  [x]Communicated Code Status , \"Full\")    Other     April MATT Tang  Care Management

## 2023-08-02 NOTE — PLAN OF CARE
Problem: Occupational Therapy - Adult  Goal: By Discharge: Performs self-care activities at highest level of function for planned discharge setting. See evaluation for individualized goals. Description: Occupational Therapy Goals:   Re-evaluated 8/2/2023, pt is gradually progressing goals, continue with goals to be met within 7-10 days    1. Patient will perform lower body dressing with minimal assistance using AE prn.   2.  Patient will perform upper body dressing with modified independence. 3.  Patient will perform bathing with minimal assistance using AE/adaptive strategies prn.  4.  Patient will perform bedside commode transfers with minimal assistance/contact guard assist maintaining NWB. 5.  Patient will perform all aspects of toileting with minimal assistance/contact guard assist.  6.  Patient will participate in upper extremity therapeutic exercise/activities with supervision/set-up for 8-10 minutes to increase ROM/strength for ADLs. 7.  Patient will utilize energy conservation techniques during functional activities with verbal cues. PLOF: Patient a poor historian.  Per chart review, pt received assist for ADLs.   8/2/2023 1157 by Akua Bergman OTR/L  Outcome: Progressing

## 2023-08-02 NOTE — CARE COORDINATION
Call made to [facility] [phone], spoke with Suzan Carson, will transport patient at 1:30 pm.

## 2023-08-02 NOTE — PROGRESS NOTES
Transport arrived to take patient to rehab.  at bedside, all belongings accounted for. patient left via stretcher.

## 2023-08-09 ENCOUNTER — OFFICE VISIT (OUTPATIENT)
Age: 63
End: 2023-08-09

## 2023-08-09 DIAGNOSIS — S82.401A TIBIA/FIBULA FRACTURE, RIGHT, CLOSED, INITIAL ENCOUNTER: Primary | ICD-10-CM

## 2023-08-09 DIAGNOSIS — S82.201A TIBIA/FIBULA FRACTURE, RIGHT, CLOSED, INITIAL ENCOUNTER: Primary | ICD-10-CM

## 2023-08-09 DIAGNOSIS — M25.571 ACUTE RIGHT ANKLE PAIN: ICD-10-CM

## 2023-08-09 NOTE — PROGRESS NOTES
Removal Of Right External Fixation, Right Tibia/fibula Open Reduction Internal Fixation; Possible Allograft Bone Grafting; Regional Nerve Block; Synthes Tibia Plates; Vivigen Allograft; C-arm - Right   SURGERY DATE: 7/26/2023   DAYS SINCE SURGERY: 14   Orders Placed This Encounter    [20694] Ankle Min 3V          SUBJECTIVE: Amna Guy is a 58 y.o. female is seen for a routine postop check. Reports no problems with the wound or other issues. Activity, diet and bowels are normal. No pain. No chest pain fever shakes chills night sweats, no calf pain. OBJECTIVE: Appears well. Incision is healing without complications or infection. Current Outpatient Medications   Medication Instructions    [START ON 8/12/2023] aspirin (GUSTAVO ASPIRIN) 325 mg, Oral, DAILY    atorvastatin (LIPITOR) 80 mg, Oral, Nightly    enoxaparin (LOVENOX) 40 mg, SubCUTAneous, DAILY    gabapentin (NEURONTIN) 300 mg, Oral, 2 times daily    metoprolol succinate (TOPROL XL) 100 mg, Oral, DAILY    oxyCODONE (ROXICODONE) 10 mg, Oral, EVERY 4 HOURS PRN    Teriflunomide 14 mg, Oral, DAILY    vitamin D 25 MCG (1000 UT) CAPS Oral        DIAGNOTIC STUDIES:    Xray Result (most recent):  XR ANKLE RIGHT (2 VIEWS) 07/26/2023    Narrative  EXAM: Right ankle x-ray 2 views    INDICATION: Intraoperative images post fixation    COMPARISON: 7/11/2023    FINDINGS:  Gina Harness the time: 51 seconds  Fluoroscopy images: 4  Status post ORIF of distal tibial fracture with sideplate and transfixing  screws. Near-anatomic alignment. No new fracture. Impression  As discussed. Orders Placed This Encounter    [[id number]] Ankle Min 3V      X RAYS AT [facility]  8/9/2023     RIGHT ANKLE X-rays,NON WEIGHT BEARING  2 views, AP/OBL completed 8/9/2023  AT [facility]     X-rays reveal Osseous:  There is a healing fracture of the right distal tibia and fibula, anatomic alignment, the chief, misalignment, there are ghost holes, and the

## 2023-08-23 ENCOUNTER — OFFICE VISIT (OUTPATIENT)
Age: 63
End: 2023-08-23

## 2023-08-23 DIAGNOSIS — M25.571 ACUTE RIGHT ANKLE PAIN: Primary | ICD-10-CM

## 2023-08-23 DIAGNOSIS — Z09 POSTOP CHECK: ICD-10-CM

## 2023-08-23 DIAGNOSIS — S82.401A TIBIA/FIBULA FRACTURE, RIGHT, CLOSED, INITIAL ENCOUNTER: ICD-10-CM

## 2023-08-23 DIAGNOSIS — S82.201A TIBIA/FIBULA FRACTURE, RIGHT, CLOSED, INITIAL ENCOUNTER: ICD-10-CM

## 2023-08-23 NOTE — PROGRESS NOTES
Removal Of Right External Fixation, Right Tibia/fibula Open Reduction Internal Fixation; Possible Allograft Bone Grafting; Regional Nerve Block; Synthes Tibia Plates; Vivigen Allograft; C-arm - Right   SURGERY DATE: 7/26/2023   DAYS SINCE SURGERY: 28   Orders Placed This Encounter    [[id number]] Ankle Min 3V          SUBJECTIVE: Yan Thomas is a 58 y.o. female is seen for a routine postop check. Reports no problems with the wound or other issues. Activity, diet and bowels are normal. No pain. No chest pain fever shakes chills night sweats, no calf pain. OBJECTIVE: Appears well. Incision is healing without complications or infection. Current Outpatient Medications   Medication Instructions    aspirin (GUSTAVO ASPIRIN) 325 mg, Oral, DAILY    atorvastatin (LIPITOR) 80 mg, Oral, Nightly    gabapentin (NEURONTIN) 300 mg, Oral, 2 times daily    metoprolol succinate (TOPROL XL) 100 mg, Oral, DAILY    Teriflunomide 14 mg, Oral, DAILY    vitamin D 25 MCG (1000 UT) CAPS Oral        DIAGNOTIC STUDIES    Orders Placed This Encounter   Procedures    [[id number]] Ankle Min 3V      RIGHT ANKLE X-rays,NON WEIGHT BEARING  3 views, AP/LAT/OBL completed 8/23/2023  AT [facility]     X-rays reveal Osseous: Hardware in good position,  Fractures are healing fractures,  there are no dislocations. No focal osteolytic or osteoblastic process. Bone Spurs: No significant bone spurs. Overall alignment is acceptable. Ankle mortise alignment is congruent, Tibial plafond and talar dome intact. Soft tissue swelling is MILD noted, No radiopaque foreign body and No abnormal calcific densities to soft tissues. No osteolytic or osteoblastic lesions noted, no projection x-ray images. Mineralization suggests no osteopenia. Degenerative changes/Joint Condition: No Significant OA is not noted. No Significant OA changes present. No Osteochondral defects see. Calcified vessels are not present.      I have personally reviewed the

## 2023-08-23 NOTE — PATIENT INSTRUCTIONS
Incisions:  Sutures removed today. Incision cleaned. Cast placed in clinic. Keep cast clean and dry.   DVT Prophylaxis : Continue Aspirin 325 mg QD  Pain medications : Tylenol OTC PRN for pain  Weight Bearing : Continue NWB, Bed to chair transfers  Return 4 weeks

## 2023-08-29 ENCOUNTER — HOME HEALTH ADMISSION (OUTPATIENT)
Age: 63
End: 2023-08-29
Payer: MEDICARE

## 2023-08-30 ENCOUNTER — HOME CARE VISIT (OUTPATIENT)
Age: 63
End: 2023-08-30
Payer: MEDICARE

## 2023-08-30 VITALS
SYSTOLIC BLOOD PRESSURE: 124 MMHG | OXYGEN SATURATION: 96 % | DIASTOLIC BLOOD PRESSURE: 82 MMHG | RESPIRATION RATE: 18 BRPM | TEMPERATURE: 97.5 F | HEART RATE: 73 BPM

## 2023-08-30 PROCEDURE — G0299 HHS/HOSPICE OF RN EA 15 MIN: HCPCS

## 2023-08-30 ASSESSMENT — ENCOUNTER SYMPTOMS
DYSPNEA ACTIVITY LEVEL: AFTER AMBULATING MORE THAN 20 FT
PAIN LOCATION - PAIN QUALITY: SHARP

## 2023-08-31 NOTE — HOME HEALTH
Skilled services/Home bound verification:     Skilled Reason for admission/summary of clinical condition:  Fx of upper and lower end of right fibula,closed fracture & Fx of lower end of right tibia,closed fracture d/t recent hospital admit/rehab ([address] [place]):   \"58year-old female with history of multiple sclerosis who was admitted after presenting to the emergency room with reports of fall resulting in right ankle ankle pain. Ankle x-ray showed acute tibial and fibular fractures. She underwent surgical intervention x2 as described above by orthopedic surgeon. She tolerated procedure well without any complications. She has had an uncomplicated postoperative stay. Stay after operation pr olonged due to sorting out disposition issues. Per communication with orthopedic surgeon patient to be discharged on Lovenox DVT prophylaxis for 10 more days. After completing course of Lovenox she is to start aspirin 325 mg p.o. daily . She is to be seen by orthopedic service, Dr. Ronald Mariano on Wednesday, August 9, 2023. Please refer to low as cut-and-paste from orthopedic surgeon Dr. Ronald Mariano in terms of discharge recommendations. \"    Nursing unable  to assess sacral area as in wheelchair and spouse reported that wasn't educated on christopher lift transfers. reported that bear hugging patient to transfer to the bed and reported that only does in morning and when time for bed. NWB on RLE due to FX. therapy scheduled for eval tomorrow and verbalized understanding to leave in bed on Friday for nursing eval of sacral area. RLE cast in place and dry flaking skin just above cast below knee. advised to not scratch as at risk of causing infection.  verbalized understanding    This patient is homebound for the following reasons Requires considerable and taxing effort to leave the home , Requires the assistance of 1 or more persons to leave the home , Only leaves the home for medical reasons or [employer] services and are infrequent and of short

## 2023-09-01 ENCOUNTER — HOME CARE VISIT (OUTPATIENT)
Age: 63
End: 2023-09-01
Payer: MEDICARE

## 2023-09-01 VITALS
SYSTOLIC BLOOD PRESSURE: 128 MMHG | OXYGEN SATURATION: 96 % | DIASTOLIC BLOOD PRESSURE: 78 MMHG | RESPIRATION RATE: 16 BRPM | HEART RATE: 104 BPM | TEMPERATURE: 97.4 F

## 2023-09-01 PROCEDURE — G0300 HHS/HOSPICE OF LPN EA 15 MIN: HCPCS

## 2023-09-01 PROCEDURE — G0151 HHCP-SERV OF PT,EA 15 MIN: HCPCS

## 2023-09-01 ASSESSMENT — ENCOUNTER SYMPTOMS: STOOL DESCRIPTION: SOFT FORMED

## 2023-09-02 NOTE — HOME HEALTH
Skilled reason for visit: Depression, Hypertension,Infection Prevention, Skin Impairment, and Medication Education     Caregiver involvement: Patient's spouse is her primary caregiver and he schedules her appointments, and schedules her transportation to and from her appointments. Patients   her medications and bathes and change patient. Patients spouse prepares her meals and assist her with her medications. The two of them will manage patient medications and complete her weekly pill planner per patient. Medications reviewed and all medications ARE available in the home this visit. The following education was provided regarding medications: Atorvastatin- Caregiver was educated on this medication and the purpose of it. Patient/Caregiver verbalized understanding    MD notified of any discrepancies/look a-like medications/medication interactions: NA    Medications are EFFECTIVE at this time. Home health supplies by type and quantity ordered/delivered this visit include: NA    Patient education provided this visit: See interventions Tab. Sharps education provided: NA    Patient level of understanding of education provided: Patient verbalized understanding to all education in interventions tab. Skilled Care Performed this visit: Vital Assessment, Disease, Medication, Safety and Infection Prevention, Education and Management    Patient response to procedure performed: Patient tolerated vital assessment well and no facial grimaces or complaints of pain or discomfort. Agency Progress toward goals: Agency staff is progressing well with patient as patient verbalizes being able to better manage her disease processes with the education received from home care staff. Patient's Progress towards personal goals:  Patient is progressing slowly and doing better in managing disease processes.     Home exercise program: Caregiver and patient both deny that patient has any redness or open areas to

## 2023-09-05 VITALS
HEART RATE: 76 BPM | SYSTOLIC BLOOD PRESSURE: 128 MMHG | OXYGEN SATURATION: 99 % | RESPIRATION RATE: 16 BRPM | DIASTOLIC BLOOD PRESSURE: 72 MMHG | TEMPERATURE: 97.6 F

## 2023-09-05 ASSESSMENT — ENCOUNTER SYMPTOMS: PAIN LOCATION - PAIN QUALITY: ACHY

## 2023-09-05 NOTE — HOME HEALTH
Medications reconciled. No changes in medications during this visit. Pt/Caregiver instructed on plan of care and ARE  agreeable to plan of care at this time. Plan of care and admission to home health status called to attending physician: Mariano Flower MD.    Discharge planning discussed with patient and caregiver. Discharge planning as follows: Discharge to self, family and under the supervision of MD once patient has met all goals or reached maximum potential.    Pt/Caregiver DID  verbalize understanding.  -  Subjective:   Pain: Patient c/o intermittent achy pain on R leg  with highest pain level of 3/10 and least pain level of 0/10 for the past 24 hours. Patient instructed on pain management techniques such as taking pain medication as prescribed by MD,  positioning and relaxation.  -  Objective  Balance:  Unsupported sitting: Normal  Static Standing balance: NA d/t safety reason  Dynamic standing balance: NA d/t safety reasons  ROM: WFL on bilateral LEs except on R ankle d/t cast  BLE MMT:  R hip flex 3-/5   R hip Abd 3-/5   R hip add 3-/5   R knee flex 3-/5  R knee ext. 3-/5    R ankle DF NA  L hip flex 3/5  L hip Abd 3/5  L hip add 3/5  L knee flex 3/5  L knee ext. 3/5  L ankle DF 3/5    FTSTS: unable. -  Assessment:    Patient is a 57 y/o female with PMHx of Anemia Hypertension Liver Injury Multiple sclerosis ([address]) PE (pulmonary embolism) pt doesn't remember this SAH (subarachnoid hemorrhage) ([address]) 1990s? Sleep apnea Spinal stenosis of cervical region with radiculopathy 07/11/2023 Spleen injury Tibial plateau fracture s/p Removal of Right External Fixation, Right Tibia/fibula Open Reduction Internal Fixation; Possible Allograft Bone Grafting; Regional Nerve Block; Synthes Tibia Plates; Vivigen Allograft; C-arm - Right on 8/23/23 and placed in Cast on R LE. Patient is NWB on R LE. Patient is now back to one level private residence with access to ramp from door. Patient lives with spouse.

## 2023-09-06 ENCOUNTER — HOME CARE VISIT (OUTPATIENT)
Age: 63
End: 2023-09-06
Payer: MEDICARE

## 2023-09-06 PROCEDURE — G0157 HHC PT ASSISTANT EA 15: HCPCS

## 2023-09-08 ENCOUNTER — HOME CARE VISIT (OUTPATIENT)
Age: 63
End: 2023-09-08
Payer: MEDICARE

## 2023-09-08 VITALS
RESPIRATION RATE: 16 BRPM | OXYGEN SATURATION: 93 % | DIASTOLIC BLOOD PRESSURE: 88 MMHG | TEMPERATURE: 97.7 F | SYSTOLIC BLOOD PRESSURE: 118 MMHG | HEART RATE: 95 BPM

## 2023-09-08 PROCEDURE — G0157 HHC PT ASSISTANT EA 15: HCPCS

## 2023-09-08 PROCEDURE — G0300 HHS/HOSPICE OF LPN EA 15 MIN: HCPCS

## 2023-09-08 ASSESSMENT — ENCOUNTER SYMPTOMS
STOOL DESCRIPTION: SOFT FORMED
PAIN LOCATION - PAIN QUALITY: ACHE

## 2023-09-08 NOTE — HOME HEALTH
readings in a daily log prior to administering her medication, understanding verbalized by patient and caregiver. Continued need for the following skills: Nursing will continue to follow patient until education is understood and able to be verbalized by patient/caregiver. Plan for next visit: Continue to educate, assess status, assess vitals, and review medications    Patient and/or caregiver notified and agrees to changes in the Plan of Care: NA    The following discharge planning was discussed with the pt/caregiver:  when patient reaches goals and medication is managed, and disease processes are understood patient agrees and understand that discharge will take place.

## 2023-09-11 ENCOUNTER — HOME CARE VISIT (OUTPATIENT)
Age: 63
End: 2023-09-11
Payer: MEDICARE

## 2023-09-11 PROCEDURE — G0157 HHC PT ASSISTANT EA 15: HCPCS

## 2023-09-13 ENCOUNTER — HOME CARE VISIT (OUTPATIENT)
Age: 63
End: 2023-09-13
Payer: MEDICARE

## 2023-09-13 VITALS
DIASTOLIC BLOOD PRESSURE: 82 MMHG | TEMPERATURE: 98.2 F | RESPIRATION RATE: 17 BRPM | SYSTOLIC BLOOD PRESSURE: 120 MMHG | HEART RATE: 92 BPM | OXYGEN SATURATION: 98 %

## 2023-09-13 PROCEDURE — G0157 HHC PT ASSISTANT EA 15: HCPCS

## 2023-09-14 ENCOUNTER — HOME CARE VISIT (OUTPATIENT)
Age: 63
End: 2023-09-14
Payer: MEDICARE

## 2023-09-14 PROCEDURE — G0152 HHCP-SERV OF OT,EA 15 MIN: HCPCS

## 2023-09-14 NOTE — HOME HEALTH
Continued training will benefit pt and spouse to include other DME/AE options, ADL compensatory strategies, WC management and mobility strategies, fall prevention training, HEP education, and caregiver safety/body mechanics. HEP consisting of:  HEP to be provided to pt by ELANA. Please include light resisit shoulder flexion/extension, AB/ADduction, horizontal AB/ADduction, elbow flexion/extension, gross grasp strengthening, fine motor coordiantion tasks. ASSESSMENT:   Pt is a pleasant and agreeable 58 y.o. woman who lives with spouse in single story home. She has recently been DCd from skilled rehab after surgery to address closed fx of proximal R fibula and proximal R tibia s/p fall. She is NWB the RLE and has a hard cast. She has a history of frequent falls ranging from \"2 to 3 per day,\" to [facility] every 4 or 5 days,\" per spouse. She currently faces barriers to max LOF due to BUE weakness L>R, NWB RLE post-surgical precuations , generalized weakness due to MS and recent surgery, cognitive deficets d/t prior TBI/SAH, inaccessible bathroom with narrow doorways and furniture blocking entry, increased need for spouse assist for all transfers and LB ADLs. Pt will benefit from 3:1 BSC for toileting in bedroom due to inaccessible bathroom; sent order request to pt's PCP and recieved fax confirmation. Pt with fair rehab potential due to the following factors. Positive prognostic factors include appropriate stimulability, orientation to self and situation, ability to make needs known, greater level of I for PLOF, supportive and engaged spouse, good motivation. Limiting prognostic factors include cognitive barriers, prior TBI, attention deficits/distractability, NWB to RLE, weakness due to MS, difficulty problem solving.     PLAN: Pt to be seen 1w1, 2w4 to address the aforementioned deficits and promote ability to live with least amount of supervision and assistance at the home level while reducing risk of further

## 2023-09-15 ENCOUNTER — HOME CARE VISIT (OUTPATIENT)
Age: 63
End: 2023-09-15
Payer: MEDICARE

## 2023-09-15 VITALS
RESPIRATION RATE: 17 BRPM | TEMPERATURE: 97.3 F | OXYGEN SATURATION: 94 % | SYSTOLIC BLOOD PRESSURE: 142 MMHG | DIASTOLIC BLOOD PRESSURE: 89 MMHG | HEART RATE: 90 BPM

## 2023-09-15 VITALS
TEMPERATURE: 98 F | OXYGEN SATURATION: 92 % | HEART RATE: 89 BPM | RESPIRATION RATE: 16 BRPM | DIASTOLIC BLOOD PRESSURE: 78 MMHG | SYSTOLIC BLOOD PRESSURE: 112 MMHG

## 2023-09-15 PROCEDURE — G0300 HHS/HOSPICE OF LPN EA 15 MIN: HCPCS

## 2023-09-15 ASSESSMENT — ENCOUNTER SYMPTOMS
PAIN LOCATION - PAIN QUALITY: THROBBING
STOOL DESCRIPTION: SOFT FORMED

## 2023-09-16 ENCOUNTER — HOME CARE VISIT (OUTPATIENT)
Age: 63
End: 2023-09-16
Payer: MEDICARE

## 2023-09-16 NOTE — CASE COMMUNICATION
YENNY Alvarenga   1w1, 2w4    Pt is a pleasant and agreeable 58 y.o. woman who lives with spouse in single story home. She has recently been DCd from skilled rehab after surgery to address closed fx of proximal R fibula and proximal R tibia s/p fall. She is NWB the RLE and has a hard cast. She has a history of frequent falls ranging from \"2 to 3 per day,\" to [facility] every 4 or 5 days,\" per spouse. She currently faces barriers to max LOF due to BUE  weakness L>R, NWB RLE post-surgical precuations , generalized weakness due to MS and recent surgery, cognitive deficets d/t prior TBI/SAH, inaccessible bathroom with narrow doorways and furniture blocking entry, increased need for spouse assist for all transfers and LB ADLs. Pt will benefit from 3:1 BSC for toileting in bedroom due to inaccessible bathroom; sent order request to pt's PCP and recieved fax confirmation. Pt with fair reha b potential due to the following factors. Positive prognostic factors include appropriate stimulability, orientation to self and situation, ability to make needs known, greater level of I for PLOF, supportive and engaged spouse, good motivation. Limiting prognostic factors include cognitive barriers, prior TBI, attention deficits/distractability, NWB to RLE, weakness due to MS, difficulty problem solving.

## 2023-09-16 NOTE — HOME HEALTH
Skilled reason for visit: Depression, Hypertension,Infection Prevention, Skin Impairment, and Medication Education     Caregiver involvement: Patient's spouse is her primary caregiver and he schedules her appointments, and schedules her transportation to and from her appointments. Patients   her medications and bathes and change patient. Patients spouse prepares her meals and assist her with her medications. The two of them will manage patient medications and complete her weekly pill planner per patient. Medications reviewed and all medications ARE available in the home this visit. The following education was provided regarding medications: Patient is well educated on all medications and able to educate this nurse on two medications and two side effects of each medication. MD notified of any discrepancies/look a-like medications/medication interactions: NA    Medications are EFFECTIVE at this time. Home health supplies by type and quantity ordered/delivered this visit include: NA    Patient education provided this visit: See interventions Tab. Sharps education provided: NA    Patient level of understanding of education provided: Patient verbalized understanding to all education in interventions tab. Skilled Care Performed this visit: Vital Assessment, Disease, Medication, Safety and Infection Prevention, Education and Management    Patient response to procedure performed: Patient tolerated vital assessment well and no facial grimaces or complaints of pain or discomfort. Agency Progress toward goals: Agency staff is progressing well with patient as patient verbalizes being able to better manage her disease processes with the education received from home care staff. Patient's Progress towards personal goals:  Patient is progressing slowly and doing better in managing disease processes.     Home exercise program:  Caregiver will be sure to change patients position at least

## 2023-09-18 ENCOUNTER — HOME CARE VISIT (OUTPATIENT)
Age: 63
End: 2023-09-18
Payer: MEDICARE

## 2023-09-19 ENCOUNTER — HOME CARE VISIT (OUTPATIENT)
Age: 63
End: 2023-09-19
Payer: MEDICARE

## 2023-09-19 VITALS
SYSTOLIC BLOOD PRESSURE: 140 MMHG | OXYGEN SATURATION: 96 % | TEMPERATURE: 97.6 F | DIASTOLIC BLOOD PRESSURE: 86 MMHG | RESPIRATION RATE: 16 BRPM | HEART RATE: 81 BPM

## 2023-09-19 PROCEDURE — G0300 HHS/HOSPICE OF LPN EA 15 MIN: HCPCS

## 2023-09-19 PROCEDURE — G0157 HHC PT ASSISTANT EA 15: HCPCS

## 2023-09-19 ASSESSMENT — ENCOUNTER SYMPTOMS: STOOL DESCRIPTION: SOFT FORMED

## 2023-09-19 NOTE — HOME HEALTH
Skilled reason for visit: Depression, Hypertension,Infection Prevention, Skin Impairment, and Medication Education     Caregiver involvement: Patient's spouse is her primary caregiver and he schedules her appointments, and schedules her transportation to and from her appointments. Patients   her medications and bathes and change patient. Patients spouse prepares her meals and assist her with her medications. The two of them will manage patient medications and complete her weekly pill planner per patient. Medications reviewed and all medications ARE available in the home this visit. The following education was provided regarding medications: Patient is well educated on all medications and able to educate this nurse on two medications and two side effects of each medication. MD notified of any discrepancies/look a-like medications/medication interactions: NA    Medications are EFFECTIVE at this time. Home health supplies by type and quantity ordered/delivered this visit include: NA    Patient education provided this visit: See interventions Tab. Sharps education provided: NA    Patient level of understanding of education provided: Patient verbalized understanding to all education in interventions tab. Skilled Care Performed this visit: Vital Assessment, Disease, Medication, Safety and Infection Prevention, Education and Management    Patient response to procedure performed: Patient tolerated vital assessment well and no facial grimaces or complaints of pain or discomfort. Agency Progress toward goals: Agency staff is progressing well with patient as patient verbalizes being able to better manage her disease processes with the education received from home care staff. Patient's Progress towards personal goals:  Patient is progressing slowly and doing better in managing disease processes.     Home exercise program: Caregiver will continue to use zinc protective barrier to

## 2023-09-20 ENCOUNTER — HOME CARE VISIT (OUTPATIENT)
Age: 63
End: 2023-09-20
Payer: MEDICARE

## 2023-09-21 ENCOUNTER — HOME CARE VISIT (OUTPATIENT)
Age: 63
End: 2023-09-21
Payer: MEDICARE

## 2023-09-21 VITALS
DIASTOLIC BLOOD PRESSURE: 86 MMHG | RESPIRATION RATE: 17 BRPM | OXYGEN SATURATION: 98 % | HEART RATE: 86 BPM | TEMPERATURE: 98 F | SYSTOLIC BLOOD PRESSURE: 132 MMHG

## 2023-09-21 PROCEDURE — G0158 HHC OT ASSISTANT EA 15: HCPCS

## 2023-09-21 PROCEDURE — G0157 HHC PT ASSISTANT EA 15: HCPCS

## 2023-09-21 NOTE — HOME HEALTH
daily for increase functional gains. Pt score 2-/5 L shoulder/elbow, 3/5 R shoulder/elbow for  on 09.21.23. IADLS: Client able to enage in IADL retraining(Cleaning counter-top  to obtain snacks ) at w/c level with SBA.  use of FWW/SUP-SBA. The  pt was able to regain with min v/c's for proper body alignment and correct trunk control. HUITRON provided moderate verbal cuing for purse-lip breathing exercises to prevent exertion when performing functional tasks. The pt able to verbalize and demo  energy conservation techniques when performing I/ADL tasks such as sitting down when performing bathing and dressing tasks. sitting down when performing meals, pacing oneself when performing a functional activity to prevent exertion, allowing adequate periods of rest after a tasks, sitting down when performing LB dressing, Sitting down when performing energy conservation techniques. Pt reports her RPE on this date was 8/10 on the modified Remedios scale. .  CONTINUED NEED FOR THE FOLLOWING SKILLS: HH OT is medically necessary to address pain, decreased functional strength, decreased independence and safety with functional transfers, decreased independence and safety performing ADL/IADL tasks, decreased activity and standing tolerance, decreased functional endurance, and impaired balance in order to improve functional independence, obtain set goals, reduce risk of falls, reduce pain, improve quality of life, and return to PLOF. Brad Granger PLAN FOR NEXT VISIT: Carissa Melendez will address functional transfers and ADLS   .   THE FOLLOWING DISCHARGE PLANNING WAS DISCUSSED WITH THE PATIENT/CAREGIVER: Tentative d/c 2w3

## 2023-09-22 ENCOUNTER — HOME CARE VISIT (OUTPATIENT)
Age: 63
End: 2023-09-22
Payer: MEDICARE

## 2023-09-22 VITALS
OXYGEN SATURATION: 98 % | SYSTOLIC BLOOD PRESSURE: 122 MMHG | HEART RATE: 86 BPM | TEMPERATURE: 98.4 F | DIASTOLIC BLOOD PRESSURE: 86 MMHG | RESPIRATION RATE: 14 BRPM

## 2023-09-22 PROCEDURE — G0158 HHC OT ASSISTANT EA 15: HCPCS

## 2023-09-22 NOTE — HOME HEALTH
SUBJECTIVE: Patient  reports; the patient is doing better and is in less need of help. [facility] CAREGIVER INVOLVEMENT/ASSISTANCE NEEDED FOR: The pt family helps with all I/ADLS due to her inability to do so. [facility] HOME HEALTH SUPPLIES BY TYPE AND QUANTITY ORDERED/DELIVERED THIS VISIT INCLUDE: NONE     . OBJECTIVE:  See interventions. .    Patient education provided this visit: HUITRON ROLE, Energy conservation techniques,and Purse-lip breathing techniques. .         Patient level of understanding of education provided: Ms. Luba Campbell was able to teach-back education provided from [address] requiring two verbal cuing form HUITRON for reenforcement of safety to prevent confusion. .    RESPONSE TO TREATMENT: Pt reported an 8/10 on numeric scale after performing ADL and IADL retraining tasks with use of energy conservation techniques. Pt required three periods of rest while performing functional mobility tasks due to exertion when performing functional activity. (Please see interventions for more details of goal previously mentioned)     . ASSESSMENT OF PROGRESS TOWARD GOALS((Please see interventions for more details)):HUITRON assessed client in grooming tasks such as (Oral Care, Brushing hair, and washing ) and UB/LB dressing;  HUITRON provided education for energy conservation techs such as pacing onself when performing Grooming tasks, Pacing onself when performing functional activity, and allowing periods of rest while employing an functional to prevent fatigue and preserve energy. In return, Patient demostrated with Min JOSE paredes after education was provided from KATELYN. Pt was able to demostrate simple meal preparation with SBA at w/c level with use of compensatory strategies such as  (sitting during tasks, pacing out tasks to allow for rest breaks, and using  reacher to reduce exertion for item retrieval, etc.) to increaseindependence with IADL tasks.     .    CONTINUED NEED FOR THE FOLLOWING

## 2023-09-23 ENCOUNTER — HOME CARE VISIT (OUTPATIENT)
Age: 63
End: 2023-09-23
Payer: MEDICARE

## 2023-09-23 NOTE — CASE COMMUNICATION
Called alfredo night, for appointment today, and she said her  would call back about appointment. He did not call back to schedule.  told.

## 2023-09-25 ENCOUNTER — HOME CARE VISIT (OUTPATIENT)
Age: 63
End: 2023-09-25
Payer: MEDICARE

## 2023-09-26 ENCOUNTER — HOME CARE VISIT (OUTPATIENT)
Age: 63
End: 2023-09-26
Payer: MEDICARE

## 2023-09-26 VITALS
HEART RATE: 86 BPM | RESPIRATION RATE: 17 BRPM | SYSTOLIC BLOOD PRESSURE: 132 MMHG | TEMPERATURE: 98.2 F | OXYGEN SATURATION: 98 % | DIASTOLIC BLOOD PRESSURE: 84 MMHG

## 2023-09-26 PROCEDURE — G0158 HHC OT ASSISTANT EA 15: HCPCS

## 2023-09-26 NOTE — HOME HEALTH
Patients spouse cancelled appointment for today due to patient having an MD appointment and several other scheduled appointments and requested that this nurse come on Thursday. Patient Friday visit was moved to Thursday and patient will be seen on Thursday.

## 2023-09-26 NOTE — HOME HEALTH
SUBJECTIVE: Patient stated she was not feeling to well and she was fatigue due to her MD visit on this date. [facility] CAREGIVER INVOLVEMENT/ASSISTANCE NEEDED FOR: The pt family helps with all I/ADLS due to her inability to do so. [facility] HOME HEALTH SUPPLIES BY TYPE AND QUANTITY ORDERED/DELIVERED THIS VISIT INCLUDE: NONE          . OBJECTIVE:  See interventions. .         Patient education provided this visit: HUITRON ROLE, Energy conservation techniques,and Purse-lip breathing techniques. .                   Patient level of understanding of education provided: Ms. Steve Miranda was able to teach-back education provided from [address] Ahuja requiring two verbal cuing form HUITRON for reenforcement of safety to prevent confusion. .         RESPONSE TO TREATMENT: Pt reported an 8/10 on numeric scale after performing ADL and IADL retraining tasks with use of energy conservation techniques. Pt required three periods of rest while performing functional mobility tasks due to exertion when performing functional activity. (Please see interventions for more details of goal previously mentioned)          . ASSESSMENT OF PROGRESS TOWARD GOALS((Please see interventions for more details)): HUITRON provided education on OT and the benefits, pt was agreeable to participating in OT [address] [place] [address],[address] services on this date. Client able to employ [place] Stands for AROM exercises such as (Straight arm swings, Shoulder Shrugs, Shoulder rotation backwards and forwards, Flexion, extenstion)  to increase BUE strength,endurance,ROM and flexion to perform a with SBAls and iadls.  This HEP is to be performed 2-3x a day with rest breaks as needed, 20 times each,with SBA, While HUITRON provided verbal prompting for proper hand placement and body alignment while performing AROM execises for increased strength, BUE ROM, and endurance training to perform I/ADLS with increased strength and

## 2023-09-27 ENCOUNTER — OFFICE VISIT (OUTPATIENT)
Age: 63
End: 2023-09-27

## 2023-09-27 DIAGNOSIS — S82.401A TIBIA/FIBULA FRACTURE, RIGHT, CLOSED, INITIAL ENCOUNTER: Primary | ICD-10-CM

## 2023-09-27 DIAGNOSIS — S82.201A TIBIA/FIBULA FRACTURE, RIGHT, CLOSED, INITIAL ENCOUNTER: Primary | ICD-10-CM

## 2023-09-27 DIAGNOSIS — Z09 POSTOP CHECK: ICD-10-CM

## 2023-09-27 NOTE — PROGRESS NOTES
Removal Of Right External Fixation, Right Tibia/fibula Open Reduction Internal Fixation; Possible Allograft Bone Grafting; Regional Nerve Block; Synthes Tibia Plates; Vivigen Allograft; C-arm - Right   SURGERY DATE: 7/26/2023   DAYS SINCE SURGERY: 63   Orders Placed This Encounter    [08973] Ankle Min 3V    External Referral To Physical Therapy     Referral Priority:   Routine     Referral Type:   Eval and Treat     Referral Reason:   Specialty Services Required     Requested Specialty:   Physical Therapist     Number of Visits Requested:   1    DME Order for (Specify) as OP     - DME device ordered - tall CAM Boot          SUBJECTIVE: Yan Thomas is a 58 y.o. female is seen for a routine postop check. Reports no problems with the wound or other issues. Activity, diet and bowels are normal. No pain. No chest pain fever shakes chills night sweats, no calf pain. Of note, her  reports having scheduling conflicts her at-home healthcare professional.     OBJECTIVE: Appears well. Incision is healed without complications or infection. Current Outpatient Medications   Medication Instructions    aspirin (GUSTAVO ASPIRIN) 325 mg, Oral, DAILY    atorvastatin (LIPITOR) 80 mg, Oral, Nightly    chlorthalidone (HYGROTON) 25 mg, Oral, DAILY    gabapentin (NEURONTIN) 300 mg, Oral, 2 times daily    metoprolol succinate (TOPROL XL) 100 mg, Oral, DAILY    oxyCODONE 10 mg, Oral, EVERY 4 HOURS PRN    Teriflunomide 14 mg, Oral, DAILY    vitamin D 25 MCG (1000 UT) CAPS Oral        DIAGNOTIC STUDIES:      RIGHT ANKLE X-rays, NWB 3 views, AP/LAT/OBL completed 9/27/2023 AT [facility]    See separate note, in the same encounter, for my interpretation of the images. ASSESSMENT: Yan Thomas is doing   well thus far postoperative. She is with her  9/27/2023  here. ICD-10-CM    1.  Tibia/fibula fracture, right, closed, initial encounter  S82.201A [30482] Ankle Min 3V    S82.401A

## 2023-09-27 NOTE — PROGRESS NOTES
[facility]   [address] [facility] [id number]       DIAGNOSTIC IMAGING      Orders Placed This Encounter   Procedures    [[id number]] Ankle Min 3V      RIGHT ANKLE X-rays,NON WEIGHT BEARING  3 views, AP/LAT/OBL completed 9/27/2023  AT [facility]     X-rays reveal Osseous: Hardware in good position,  Fractures are healing fractures,  there are no dislocations. No focal osteolytic or osteoblastic process. Bone Spurs: No significant bone spurs. Overall alignment is acceptable. Ankle mortise alignment is congruent, Tibial plafond and talar dome intact. Soft tissue swelling is hard to assess due to casting noted, No radiopaque foreign body and No abnormal calcific densities to soft tissues. No osteolytic or osteoblastic lesions noted, no projection x-ray images. Mineralization suggests no osteopenia. Degenerative changes/Joint Condition: No Significant OA is not noted. No Significant OA changes present. No Osteochondral defects see. Calcified vessels are not present. I have personally reviewed the results of the above study and the interpretation of this study is my professional opinion.    Spring Cuello MD  9/27/2023  9:39 AM

## 2023-09-28 ENCOUNTER — HOME CARE VISIT (OUTPATIENT)
Age: 63
End: 2023-09-28
Payer: MEDICARE

## 2023-09-28 VITALS
TEMPERATURE: 97.4 F | SYSTOLIC BLOOD PRESSURE: 128 MMHG | RESPIRATION RATE: 16 BRPM | DIASTOLIC BLOOD PRESSURE: 74 MMHG | HEART RATE: 73 BPM | OXYGEN SATURATION: 100 %

## 2023-09-28 VITALS
DIASTOLIC BLOOD PRESSURE: 78 MMHG | OXYGEN SATURATION: 97 % | HEART RATE: 88 BPM | SYSTOLIC BLOOD PRESSURE: 128 MMHG | RESPIRATION RATE: 16 BRPM | TEMPERATURE: 97.8 F

## 2023-09-28 PROCEDURE — G0151 HHCP-SERV OF PT,EA 15 MIN: HCPCS

## 2023-09-28 PROCEDURE — G0300 HHS/HOSPICE OF LPN EA 15 MIN: HCPCS

## 2023-09-28 ASSESSMENT — ENCOUNTER SYMPTOMS
BOWEL INCONTINENCE: 1
STOOL DESCRIPTION: SOFT FORMED

## 2023-09-28 NOTE — HOME HEALTH
Skilled reason for visit: Depression, Hypertension,Infection Prevention, Skin Impairment, and Medication Education     Caregiver involvement: Patient's spouse is her primary caregiver and he schedules her appointments, and schedules her transportation to and from her appointments. Patients   her medications and bathes and change patient. Patients spouse prepares her meals and assist her with her medications. The two of them will manage patient medications and complete her weekly pill planner per patient. Medications reviewed and all medications ARE available in the home this visit. The following education was provided regarding medications: Patient is well educated on all medications and able to educate this nurse on two medications and two side effects of each medication. MD notified of any discrepancies/look a-like medications/medication interactions: NA    Medications are EFFECTIVE at this time. Home health supplies by type and quantity ordered/delivered this visit include: NA    Patient education provided this visit: See interventions Tab. Sharps education provided: NA    Patient level of understanding of education provided: Patient verbalized understanding to all education in interventions tab. Skilled Care Performed this visit: Vital Assessment, Disease, Medication, Safety and Infection Prevention, Education and Management    Patient response to procedure performed: Patient tolerated vital assessment well and no facial grimaces or complaints of pain or discomfort. Agency Progress toward goals: Agency staff is progressing well with patient as patient verbalizes being able to better manage her disease processes with the education received from home care staff. Patient's Progress towards personal goals:  Patient is progressing slowly and doing better in managing disease processes.     Home exercise program: Patient will alternate pressure from her buttocks to her right

## 2023-09-29 ENCOUNTER — HOME CARE VISIT (OUTPATIENT)
Age: 63
End: 2023-09-29
Payer: MEDICARE

## 2023-09-29 VITALS
OXYGEN SATURATION: 98 % | SYSTOLIC BLOOD PRESSURE: 130 MMHG | TEMPERATURE: 98 F | HEART RATE: 86 BPM | RESPIRATION RATE: 17 BRPM | DIASTOLIC BLOOD PRESSURE: 86 MMHG

## 2023-09-29 PROCEDURE — G0158 HHC OT ASSISTANT EA 15: HCPCS

## 2023-09-29 NOTE — HOME HEALTH
SUBJECTIVE: Pts  reports pts f/u visit with ortho went well. Denies any medication changes. Next f/u is scheduled on 10/18 and pt is to wear the boot until then but is allowed to take it off when she is in bed. Pt is allowed to place foot on the floor to perform pivot transfers but not allowed to walk  CAREGIVER INVOLVEMENT/ASSISTANCE NEEDED FOR: Pts  assists with transfers, meals, medications and transportation to appointments  [address] [place] [address] Avenue: none  OBJECTIVE:  See interventions. MEDICATIONS: Medications reconciled and all medications are available in the home this visit. The following education was provided regarding medications, medication interactions, and look a like medications: Continue medication as prescribed by MD. Medications are effective at this time. PATIENT RESPONSE TO TREATMENT:  Pt was laying in bed resting upon arrival with boot on the R foot. Pt is dependent of another to don/doff her boot. R ankle ROM are as follows- DF -9, PF 32, Inv 8, EV 12 w/o increased c/o pain. Pt reported a \"stretching\" at end range with PROM. Instructed pt/ in ankle ROM exercises and they will need ongoing education for proper techniques. PATIENT LEVEL OF UNDERSTANDING OF EDUCATION PROVIDED: Needs ongoing education to progress R ankle ROM and strengthening exercises so pt can have proper mechanics when released to begain gait training  ASSESSMENT OF PROGRESS TOWARD GOALS: Pt is making slow steady progress towards goals. Pt remains fearful with sldie board transfers and pt/ prefer a pivot transfer which is more practical signs being released to lay foot on floor for transfers  [address] [facility]: Progression of R ankle ROM and strength to promote safe transfers and mobility in the home. PLAN FOR NEXT VISIT: Con't PT 1W4 then reassess after ortho f/u pedning WB status change.    THE FOLLOWING DISCHARGE

## 2023-09-30 NOTE — HOME HEALTH
SUBJECTIVE: Patient agreeable to participating in OT [address] [facility] services on this date in bed. Jimmie Closs CAREGIVER INVOLVEMENT/ASSISTANCE NEEDED FOR: The pt family helps with all I/ADLS due to her inability to do so. Jimmie Closs HOME HEALTH SUPPLIES BY TYPE AND QUANTITY ORDERED/DELIVERED THIS VISIT INCLUDE: NONE           . OBJECTIVE:  See interventions. .         Patient education provided this visit: HUITRON ROLE, Energy conservation techniques,and Purse-lip breathing techniques. .               Patient level of understanding of education provided: Ms. Leticia Sanchez was able to teach-back education provided from [address] Ahuja requiring two verbal cuing form HUITRON for reenforcement of safety to prevent confusion. .            RESPONSE TO TREATMENT: Pt reported an 8/10 on numeric scale after performing ADL and IADL retraining tasks with use of energy conservation techniques. Pt required three periods of rest while performing functional mobility tasks due to exertion when performing functional activity. (Please see interventions for more details of goal previously mentioned)          . ASSESSMENT OF PROGRESS TOWARD GOALS((Please see interventions for more details)): Patient performed functional [facility] tasks such as( Zipping. latching and placing cords into loop and threading) to promote independence and to stimulate dressing with independence. while HUITRON provided verbal instructions for using pincer grasp to perform this activity to promote [facility] increase strength, dexterity, ROM and increased independence. Pt completed upgraded HEP addressing the BUE strength, endurance, and ROM. HUITRON instructed patient on BUE exercises such as (  shoulder flexion and extension,abduction, horizontal press,triceps and bicep curls) with use of 2# weights, while dynamic sitting in chair with arms. .15 times each with MOD I, with no periods of rest  HUITRON provided verbal prompting for purse-lip breathing

## 2023-10-02 ENCOUNTER — HOME CARE VISIT (OUTPATIENT)
Age: 63
End: 2023-10-02
Payer: MEDICARE

## 2023-10-02 VITALS
RESPIRATION RATE: 17 BRPM | DIASTOLIC BLOOD PRESSURE: 86 MMHG | HEART RATE: 86 BPM | OXYGEN SATURATION: 98 % | SYSTOLIC BLOOD PRESSURE: 124 MMHG | TEMPERATURE: 98 F

## 2023-10-02 VITALS
HEART RATE: 76 BPM | RESPIRATION RATE: 16 BRPM | TEMPERATURE: 97.7 F | SYSTOLIC BLOOD PRESSURE: 112 MMHG | DIASTOLIC BLOOD PRESSURE: 70 MMHG | OXYGEN SATURATION: 93 %

## 2023-10-02 PROCEDURE — G0300 HHS/HOSPICE OF LPN EA 15 MIN: HCPCS

## 2023-10-02 PROCEDURE — G0158 HHC OT ASSISTANT EA 15: HCPCS

## 2023-10-02 ASSESSMENT — ENCOUNTER SYMPTOMS: STOOL DESCRIPTION: SOFT FORMED

## 2023-10-02 NOTE — HOME HEALTH
Skilled reason for visit: Depression, Hypertension,Infection Prevention, Skin Impairment, and Medication Education     Caregiver involvement: Patient's spouse is her primary caregiver and he schedules her appointments, and schedules her transportation to and from her appointments. Patients   her medications and bathes and change patient. Patients spouse prepares her meals and assist her with her medications. The two of them will manage patient medications and complete her weekly pill planner per patient. Medications reviewed and all medications ARE available in the home this visit. The following education was provided regarding medications: Patient is well educated on all medications and able to educate this nurse on two medications and two side effects of each medication. MD notified of any discrepancies/look a-like medications/medication interactions: NA    Medications are EFFECTIVE at this time. Home health supplies by type and quantity ordered/delivered this visit include: NA    Patient education provided this visit: See interventions Tab. Sharps education provided: NA    Patient level of understanding of education provided: Patient verbalized understanding to all education in interventions tab. Skilled Care Performed this visit: Vital Assessment, Disease, Medication, Safety and Infection Prevention, Education and Management    Patient response to procedure performed: Patient tolerated vital assessment well and no facial grimaces or complaints of pain or discomfort. Agency Progress toward goals: Agency staff is progressing well with patient as patient verbalizes being able to better manage her disease processes with the education received from home care staff. Patient's Progress towards personal goals:  Patient is progressing slowly and doing better in managing disease processes.     Home exercise program: Caregiver will be sure to apply foam dressing to area on left

## 2023-10-02 NOTE — HOME HEALTH
SUBJECTIVE: Patient stated she is getting around her home more often these days. Nidia Carlin CAREGIVER INVOLVEMENT/ASSISTANCE NEEDED FOR: The pt family helps with all I/ADLS due to her inability to do so. HOME HEALTH SUPPLIES BY TYPE AND QUANTITY ORDERED/DELIVERED THIS VISIT INCLUDE: NONE               . OBJECTIVE:  See interventions. .                   Patient education provided this visit: HUITRON ROLE, Energy conservation techniques,and Purse-lip breathing techniques. .                              Patient level of understanding of education provided: Ms. Nile Gagnon was able to teach-back education provided from 606[address] Ahuja requiring two verbal cuing form HUITRON for reenforcement of safety to prevent confusion. .                        RESPONSE TO TREATMENT: Pt reported an 8/10 on numeric scale after performing ADL and IADL retraining tasks with use of energy conservation techniques. Pt required three periods of rest while performing functional mobility tasks due to exertion when performing functional activity. (Please see interventions for more details of goal previously mentioned)                    . ASSESSMENT OF PROGRESS TOWARD GOALS((Please see interventions for more details)): Pt completed upgraded HEP addressing the BUE strength, endurance, and ROM. HUITRON instructed patient on BUE exercises such as (  shoulder flexion and extension,abduction, horizontal press,triceps and bicep curls) with use of red thera-band  while dynamic sitting in chair with arms. .15 times each with MOD I, with two periods of rest  HUITRON provided verbal prompting for purse-lip breathing exercises due to patient holding her breathe while demonstrating BUE gross exercises. The patient/caregiver safely perform bed transfers with proper body mechanics and appropriate weight bearing with moderate assistance utilizing 3 in 1 commode for the

## 2023-10-03 ENCOUNTER — HOME CARE VISIT (OUTPATIENT)
Age: 63
End: 2023-10-03
Payer: MEDICARE

## 2023-10-03 PROCEDURE — G0157 HHC PT ASSISTANT EA 15: HCPCS

## 2023-10-04 ASSESSMENT — ENCOUNTER SYMPTOMS: PAIN LOCATION - PAIN QUALITY: ACHE

## 2023-10-05 ENCOUNTER — HOME CARE VISIT (OUTPATIENT)
Age: 63
End: 2023-10-05
Payer: MEDICARE

## 2023-10-05 VITALS
HEART RATE: 81 BPM | RESPIRATION RATE: 16 BRPM | TEMPERATURE: 97.4 F | OXYGEN SATURATION: 96 % | SYSTOLIC BLOOD PRESSURE: 120 MMHG | DIASTOLIC BLOOD PRESSURE: 78 MMHG

## 2023-10-05 PROCEDURE — G0300 HHS/HOSPICE OF LPN EA 15 MIN: HCPCS

## 2023-10-05 ASSESSMENT — ENCOUNTER SYMPTOMS: STOOL DESCRIPTION: SOFT FORMED

## 2023-10-05 NOTE — HOME HEALTH
Skilled reason for visit: Depression, Hypertension,Infection Prevention, Skin Impairment, and Medication Education     Caregiver involvement: Patient's spouse is her primary caregiver and he schedules her appointments, and schedules her transportation to and from her appointments. Patients   her medications and bathes and change patient. Patients spouse prepares her meals and assist her with her medications. The two of them will manage patient medications and complete her weekly pill planner per patient. Medications reviewed and all medications ARE available in the home this visit. The following education was provided regarding medications: Patient is well educated on all medications and able to educate this nurse on two medications and two side effects of each medication. MD notified of any discrepancies/look a-like medications/medication interactions: NA    Medications are EFFECTIVE at this time. Home health supplies by type and quantity ordered/delivered this visit include: NA    Patient education provided this visit: See interventions Tab. Sharps education provided: NA    Patient level of understanding of education provided: Patient verbalized understanding to all education in interventions tab. Skilled Care Performed this visit: Vital Assessment, Disease, Medication, Safety and Infection Prevention, Education and Management    Patient response to procedure performed: Patient tolerated vital assessment well and no facial grimaces or complaints of pain or discomfort. Agency Progress toward goals: Agency staff is progressing well with patient as patient verbalizes being able to better manage her disease processes with the education received from home care staff. Patient's Progress towards personal goals:  Patient is progressing slowly and doing better in managing disease processes.     Home exercise program: Caregiver will be sure to reapply alginate and foam dressing to

## 2023-10-06 ENCOUNTER — HOME CARE VISIT (OUTPATIENT)
Age: 63
End: 2023-10-06
Payer: MEDICARE

## 2023-10-06 PROCEDURE — G0158 HHC OT ASSISTANT EA 15: HCPCS

## 2023-10-06 NOTE — HOME HEALTH
decreased functional strength, decreased independence and safety with functional transfers, decreased independence and safety performing ADL/IADL tasks, decreased activity and standing tolerance, decreased functional endurance, and impaired balance in order to improve functional independence, obtain set goals, reduce risk of falls, reduce pain, improve quality of life, and return to PLOF. Nidia Carlin PLAN FOR NEXT VISIT: Sruthi Roque will address functional transfers and ADLS   .   THE FOLLOWING DISCHARGE PLANNING WAS DISCUSSED WITH THE PATIENT/CAREGIVER: Tentative d/c 2w1

## 2023-10-09 ENCOUNTER — HOME CARE VISIT (OUTPATIENT)
Age: 63
End: 2023-10-09
Payer: MEDICARE

## 2023-10-09 VITALS
OXYGEN SATURATION: 98 % | RESPIRATION RATE: 17 BRPM | DIASTOLIC BLOOD PRESSURE: 85 MMHG | SYSTOLIC BLOOD PRESSURE: 115 MMHG | TEMPERATURE: 98.4 F | HEART RATE: 79 BPM

## 2023-10-09 PROCEDURE — G0158 HHC OT ASSISTANT EA 15: HCPCS

## 2023-10-09 PROCEDURE — G0300 HHS/HOSPICE OF LPN EA 15 MIN: HCPCS

## 2023-10-09 ASSESSMENT — ENCOUNTER SYMPTOMS: STOOL DESCRIPTION: SOFT FORMED

## 2023-10-10 ENCOUNTER — HOME CARE VISIT (OUTPATIENT)
Age: 63
End: 2023-10-10
Payer: MEDICARE

## 2023-10-10 VITALS
SYSTOLIC BLOOD PRESSURE: 115 MMHG | RESPIRATION RATE: 17 BRPM | OXYGEN SATURATION: 98 % | HEART RATE: 79 BPM | TEMPERATURE: 98.4 F | DIASTOLIC BLOOD PRESSURE: 85 MMHG

## 2023-10-10 ASSESSMENT — ENCOUNTER SYMPTOMS: PAIN LOCATION - PAIN QUALITY: ACHING

## 2023-10-10 NOTE — HOME HEALTH
pain med's and follow up visits with MD. Even though pain level today is a 5/10, it did not affect ability to perform therapy tasks. (Please see pain tab for more details.)   . CONTINUED NEED FOR THE FOLLOWING SKILLS: HH OT is medically necessary to address pain, decreased functional strength, decreased independence and safety with functional transfers, decreased independence and safety performing ADL/IADL tasks, decreased activity and standing tolerance, decreased functional endurance, and impaired balance in order to improve functional independence, obtain set goals, reduce risk of falls, reduce pain, improve quality of life, and return to PLOF. Alicja Eastman PLAN FOR NEXT VISIT: OT Reassessment/OT Discharge     .   THE FOLLOWING DISCHARGE PLANNING WAS DISCUSSED WITH THE PATIENT/CAREGIVER: Tentative d/c 2w1

## 2023-10-12 ENCOUNTER — HOME CARE VISIT (OUTPATIENT)
Age: 63
End: 2023-10-12
Payer: MEDICARE

## 2023-10-12 PROCEDURE — G0157 HHC PT ASSISTANT EA 15: HCPCS

## 2023-10-13 ENCOUNTER — HOME CARE VISIT (OUTPATIENT)
Age: 63
End: 2023-10-13
Payer: MEDICARE

## 2023-10-13 PROCEDURE — G0152 HHCP-SERV OF OT,EA 15 MIN: HCPCS

## 2023-10-15 VITALS
RESPIRATION RATE: 17 BRPM | SYSTOLIC BLOOD PRESSURE: 124 MMHG | OXYGEN SATURATION: 98 % | DIASTOLIC BLOOD PRESSURE: 80 MMHG | HEART RATE: 78 BPM | TEMPERATURE: 98.2 F

## 2023-10-15 VITALS
SYSTOLIC BLOOD PRESSURE: 130 MMHG | DIASTOLIC BLOOD PRESSURE: 72 MMHG | HEART RATE: 76 BPM | OXYGEN SATURATION: 97 % | TEMPERATURE: 97.8 F | RESPIRATION RATE: 18 BRPM

## 2023-10-15 ASSESSMENT — ENCOUNTER SYMPTOMS: PAIN LOCATION - PAIN QUALITY: ACHE

## 2023-10-16 ENCOUNTER — HOME CARE VISIT (OUTPATIENT)
Age: 63
End: 2023-10-16
Payer: MEDICARE

## 2023-10-16 VITALS
HEART RATE: 95 BPM | OXYGEN SATURATION: 95 % | SYSTOLIC BLOOD PRESSURE: 120 MMHG | RESPIRATION RATE: 16 BRPM | DIASTOLIC BLOOD PRESSURE: 72 MMHG | TEMPERATURE: 97.3 F

## 2023-10-16 PROCEDURE — G0299 HHS/HOSPICE OF RN EA 15 MIN: HCPCS

## 2023-10-16 PROCEDURE — G0157 HHC PT ASSISTANT EA 15: HCPCS

## 2023-10-16 ASSESSMENT — ENCOUNTER SYMPTOMS
BOWEL INCONTINENCE: 1
PAIN LOCATION - PAIN QUALITY: THROBBING

## 2023-10-16 NOTE — HOME HEALTH
Skilled reason for visit: reassessement and wound care    Caregiver involvement: spouse assist with bathing dressing meal prep and transportation/meds management. Medications reviewed and all medications are available in the home this visit. The following education was provided regarding medications:  reviewed all medication bottles in home for frequency, side effects and precautions. verbalized understanding    Medications are effective at this time. Home health supplies by type and quantity ordered/delivered this visit include: wound care supplies in the home    Patient education provided this visit: see intervention tab. Patient level of understanding of education provided: see intervention tab for level of understanding. Patient response to procedure performed:  tolerated wound care without any complaints of pain  Agency Progress toward goals: see intervention tab for progressing toward goals        Patient's Progress towards personal goals: see intervention tab for progressing toward goals        Home exercise program: cont to take all medications/diet as prescrived, follow pressure ulcer precautions, follow all fall precautions and use any assistive devices reccomended by therapy or medical providers, reported any abnormal s/sx of infection/HTN to MD immediately. disease process teaching packets/ home care booklet for reference. call home care for any concerns/questions. keep all medical appts. Continued need for the following skills: Nursing. Plan for next visit: wound care    Patient and/or caregiver notified and agrees to changes in the Plan of Care: Yes. Dr Duncan Luz was told of 2 week 2 added for nursing to assess/perform wound care to left buttocks stage 2 pressure ulcer. The following discharge planning was discussed with the pt/caregiver: Will discharge when all wound care goals are met.

## 2023-10-18 ENCOUNTER — OFFICE VISIT (OUTPATIENT)
Age: 63
End: 2023-10-18

## 2023-10-18 VITALS — HEIGHT: 63 IN | BODY MASS INDEX: 31.53 KG/M2

## 2023-10-18 VITALS
OXYGEN SATURATION: 98 % | DIASTOLIC BLOOD PRESSURE: 70 MMHG | SYSTOLIC BLOOD PRESSURE: 122 MMHG | RESPIRATION RATE: 17 BRPM | TEMPERATURE: 98.2 F | HEART RATE: 96 BPM

## 2023-10-18 DIAGNOSIS — S82.401A TIBIA/FIBULA FRACTURE, RIGHT, CLOSED, INITIAL ENCOUNTER: Primary | ICD-10-CM

## 2023-10-18 DIAGNOSIS — S82.201A TIBIA/FIBULA FRACTURE, RIGHT, CLOSED, INITIAL ENCOUNTER: Primary | ICD-10-CM

## 2023-10-18 DIAGNOSIS — Z09 POSTOP CHECK: ICD-10-CM

## 2023-10-18 ASSESSMENT — ENCOUNTER SYMPTOMS: PAIN LOCATION - PAIN QUALITY: ACHE

## 2023-10-18 NOTE — PATIENT INSTRUCTIONS
[facility] [address] [place], [address]   [phone]  DME: hinged AFO brace        Please follow up with your PCP for any health maintenance as recommended.

## 2023-10-18 NOTE — PROGRESS NOTES
[facility]   [address] [facility] [id number]       DIAGNOSTIC IMAGING      Orders Placed This Encounter   Procedures    [[id number]] Ankle Min 3V        RIGHT ANKLE X-rays,NON WEIGHT BEARING  3 views, AP/LAT/OBL completed 10/18/2023  AT [facility]     X-rays reveal Osseous: Hardware in good position,  Fractures are healing fractures slowly. The distal tibia fracture is healing slowly fracture line still apparent. There are no dislocations. No focal osteolytic or osteoblastic process. Bone Spurs: No significant bone spurs. Overall alignment is acceptable. Ankle mortise alignment is congruent, Tibial plafond and talar dome intact. Soft tissue swelling is minimal noted, No radiopaque foreign body and No abnormal calcific densities to soft tissues. No osteolytic or osteoblastic lesions noted, no projection x-ray images. Mineralization suggests definite osteopenia. Degenerative changes/Joint Condition: No Significant OA is not noted. No Significant OA changes present. No Osteochondral defects see. Calcified vessels are not present. I have personally reviewed the results of the above study and the interpretation of this study is my professional opinion.      Jhonny Montiel MD  10/18/2023  11:11 AM
hinged AFO brace from  Group   Return 4 weeks        Orders Placed This Encounter    [[id number]] Ankle Min 3V      Patient Instructions   [facility]  [address] [facility]   [facility] [address]   [phone]  DME: hinged AFO brace        Please follow up with your PCP for any health maintenance as recommended. Documentation by cindy Parmar, as dictated by Kristina Arciniega. MD Flor on 10/18/2023.

## 2023-10-20 ENCOUNTER — HOME CARE VISIT (OUTPATIENT)
Age: 63
End: 2023-10-20
Payer: MEDICARE

## 2023-10-20 PROCEDURE — G0299 HHS/HOSPICE OF RN EA 15 MIN: HCPCS

## 2023-10-21 VITALS
RESPIRATION RATE: 19 BRPM | DIASTOLIC BLOOD PRESSURE: 74 MMHG | SYSTOLIC BLOOD PRESSURE: 120 MMHG | TEMPERATURE: 98.4 F | OXYGEN SATURATION: 98 % | HEART RATE: 84 BPM

## 2023-10-21 ASSESSMENT — ENCOUNTER SYMPTOMS: BOWEL INCONTINENCE: 1

## 2023-10-21 NOTE — HOME HEALTH
Skilled reason for visit: wound care    Caregiver involvement: spouse assist with ADLS AND WOUND CARE. Medications reviewed and all medications are available in the home this visit. The following education was provided regarding medications:  reviewed all medication bottles in home for frequency, side effects and precautions. verbalized understanding        Medications are effective at this time. Home health supplies by type and quantity ordered/delivered this visit include: [address]    Patient education provided this visit: see intervention tab. Patient level of understanding of education provided: see intervention tab for level of understanding. Patient response to procedure performed: Tolerated wound care without complaints of pain    Agency Progress toward goals: see intervention tab for progressing toward goals        Patient's Progress towards personal goals: see intervention tab for progressing toward goals        Home exercise program: cont to take all medications/diet as prescribed, follow pressure ulcer precautions, follow all fall precautions and use any assistive devices recommended by therapy or medical providers, reported any abnormal s/sx of infection to MD immediately. disease process teaching packets/ home care booklet for reference. call home care for any concerns/questions. keep all medical appts. Continued need for the following skills: Nursing. Plan for next visit: wound care    Patient and/or caregiver notified and agrees to changes in the Plan of Care: N/A. The following discharge planning was discussed with the pt/caregiver: Will discharge next week.

## 2023-10-23 ENCOUNTER — HOME CARE VISIT (OUTPATIENT)
Age: 63
End: 2023-10-23
Payer: MEDICARE

## 2023-10-25 ENCOUNTER — HOME CARE VISIT (OUTPATIENT)
Age: 63
End: 2023-10-25
Payer: MEDICARE

## 2023-10-26 ENCOUNTER — HOME CARE VISIT (OUTPATIENT)
Age: 63
End: 2023-10-26
Payer: MEDICARE

## 2023-10-26 VITALS
HEART RATE: 73 BPM | TEMPERATURE: 97.4 F | RESPIRATION RATE: 16 BRPM | SYSTOLIC BLOOD PRESSURE: 118 MMHG | OXYGEN SATURATION: 97 % | DIASTOLIC BLOOD PRESSURE: 80 MMHG

## 2023-10-26 PROCEDURE — G0151 HHCP-SERV OF PT,EA 15 MIN: HCPCS

## 2023-10-26 ASSESSMENT — ENCOUNTER SYMPTOMS
PAIN LOCATION - PAIN QUALITY: ACHING
CONSTIPATION: 1

## 2023-10-26 NOTE — HOME HEALTH
SUBJECTIVE: Pt sitting up in her wheelchair in the living room upon arrival. Pt reports she is doing well and doind her own ADL's once her  hands her the items needed. She reports she is dressing her self and getting herself out of bed with her being nearby. Denies any falls or medication changes. Reports intermittent pain 0-3/10 in R ankle and L knee  CAREGIVER INVOLVEMENT/ASSISTANCE NEEDED FOR: Pts  assists with meals, medications, ADL's and transportation to appointments  [address] ORDERED/DELIVERED THIS VISIT INCLUDE: none  OBJECTIVE:  See interventions. MEDICATIONS: Medications reconciled and all medications are available in the home this visit. The following education was provided regarding medications, medication interactions, and look a like medications: Continue medication as prescribed by MD. Medications are effective at this time. PATIENT RESPONSE TO TREATMENT:  No c/o increased pain during assessment/exercises  PATIENT LEVEL OF UNDERSTANDING OF EDUCATION PROVIDED: Pt/ verbalized understanding of discharge from PT at this time as pt has reached max rehab potential and met all goals within the parapmeters of her WB restrictions. Discussed progressing with gait training and per pts  they were told pt is allowed to [facility] on the R foot with the boot donned but the ortho does not really want her walking around the house on it until she goes back to see him again. I informed him that per her chart she is allowed to [facility] with ambulation but  wishes to wait until pt gets a brace for the LE and sees the ortho again before starting gait training. Instructed pt he will need to get another order so we can come back to begin gait training when appropriate and he verbalized understanding. ASSESSMENT OF PROGRESS TOWARD GOALS: Pt has met all established goals that were set within the NWB restrictions.  Pt has not returned to [facility] as pt is hesitant to

## 2023-10-27 ENCOUNTER — HOME CARE VISIT (OUTPATIENT)
Age: 63
End: 2023-10-27
Payer: MEDICARE

## 2023-10-27 VITALS
OXYGEN SATURATION: 100 % | HEART RATE: 78 BPM | TEMPERATURE: 98.3 F | RESPIRATION RATE: 14 BRPM | DIASTOLIC BLOOD PRESSURE: 72 MMHG | SYSTOLIC BLOOD PRESSURE: 126 MMHG

## 2023-10-27 PROCEDURE — G0299 HHS/HOSPICE OF RN EA 15 MIN: HCPCS

## 2023-10-27 ASSESSMENT — ENCOUNTER SYMPTOMS: BOWEL INCONTINENCE: 1

## 2023-10-28 NOTE — HOME HEALTH
Skilled reason for visit: discharge    Caregiver involvement: spouse assist with ADLS/MEAL PREP/MEDICATION MANAGEMENT/transportation and wound care due to cognitive issues. reported that waiting on bed side commode to have BM in toilet. Medications reviewed and all medications are available in the home this visit. The following education was provided regarding medications:  reviewed all medication bottles in home for frequency, si de effects and precautions. verbalized understanding        Medications are effective at this time. Home health supplies by type and quantity ordered/delivered this visit include: zinc in the home.     Patient education provided this visit: see discharge summary/intervention tab            Patient level of understanding of education provided: verbalized understanding to all teaching in discharge summary and intervention tab           Agency Progress toward goals:all goals met    Patient's Progress towards personal goals: all goals met

## 2025-02-13 NOTE — CARE COORDINATION
Patient awaiting authorization for SNF from [facility]. MILES spoke with Bella Mock, she reports no auth rec'd yet however requesting updated therapy notes-  OT treatment notes in for today, no PT notes seen. Call placed to  Олег Luke, therapy supervisor . She will ensure updated PT notes done today.     Robbie Nesbitt, RN CCM  Interim Manager  Dept no

## 2025-02-26 ENCOUNTER — TRANSCRIBE ORDERS (OUTPATIENT)
Facility: HOSPITAL | Age: 65
End: 2025-02-26

## 2025-02-26 DIAGNOSIS — R74.8 ABNORMAL LIVER ENZYMES: Primary | ICD-10-CM

## 2025-02-26 DIAGNOSIS — Z12.11 COLON CANCER SCREENING: ICD-10-CM

## 2025-02-26 DIAGNOSIS — R74.01 ELEVATED AST (SGOT): ICD-10-CM

## 2025-02-26 DIAGNOSIS — R74.01 ELEVATED ALT MEASUREMENT: ICD-10-CM

## (undated) DEVICE — TABLE COVER: Brand: CONVERTORS

## (undated) DEVICE — SOLUTION IV 1000ML 0.9% SOD CHL

## (undated) DEVICE — STERILE LATEX POWDER-FREE SURGICAL GLOVESWITH NITRILE COATING: Brand: PROTEXIS

## (undated) DEVICE — PACK SURG BSHR TOT KNEE LF

## (undated) DEVICE — DRESSING,GAUZE,XEROFORM,CURAD,1"X8",ST: Brand: CURAD

## (undated) DEVICE — BANDAGE COBAN 4 IN COMPR W4INXL5YD FOAM COHESIVE QUIK STK SELF ADH SFT

## (undated) DEVICE — STIMULATOR BONE GROWTH ELEC NONINVASIVE FOR OSTEOGENESIS

## (undated) DEVICE — BANDAGE,GAUZE,BULKEE II,2.25"X3YD,STRL: Brand: MEDLINE

## (undated) DEVICE — BANDAGE COMPR SELF ADH 5 YDX4 IN TAN STRL PREMIERPRO LF

## (undated) DEVICE — SUTURE VCRL SZ 2-0 L27IN ABSRB UD L26MM SH 1/2 CIR J417H

## (undated) DEVICE — GAUZE,SPONGE,4"X4",16PLY,STRL,LF,10/TRAY: Brand: MEDLINE

## (undated) DEVICE — ROD EXT FIX L350MM DIA11MM C FBR MR CONDITIONAL

## (undated) DEVICE — DRAPE C ARM UNIV W41XL74IN CLR PLAS XR VELC CLSR POLY STRP

## (undated) DEVICE — PAD,ABDOMINAL,8"X10",ST,LF: Brand: MEDLINE

## (undated) DEVICE — APPLICATOR MEDICATED 26 CC SOLUTION HI LT ORNG CHLORAPREP

## (undated) DEVICE — DERMACEA GAUZE ROLL: Brand: DERMACEA

## (undated) DEVICE — THREE-QUARTER SHEET: Brand: CONVERTORS

## (undated) DEVICE — CLAMP EXT FIX L TI ALLY COMB CLP ON SELF HLD

## (undated) DEVICE — ZIMMER® STERILE DISPOSABLE TOURNIQUET CUFF WITH PLC, DUAL PORT, SINGLE BLADDER, 34 IN. (86 CM)

## (undated) DEVICE — 3 ML SYRINGE WITH HYPODERMIC SAFETY NEEDLE: Brand: MAGELLAN

## (undated) DEVICE — DRAPE,HAND,STERILE: Brand: MEDLINE

## (undated) DEVICE — GLOVE ORTHO 8   MSG9480

## (undated) DEVICE — INTENDED FOR TISSUE SEPARATION, AND OTHER PROCEDURES THAT REQUIRE A SHARP SURGICAL BLADE TO PUNCTURE OR CUT.: Brand: BARD-PARKER SAFETY BLADES SIZE 10, STERILE

## (undated) DEVICE — BIT DRL L140MM DIA2MM QUIK CPL 3 FLUT CALIB DEPTH MRK W/O

## (undated) DEVICE — SCREW BNE L36MM DIA2.7MM CORT S STL ST T8 STARDRV RECESS
Type: IMPLANTABLE DEVICE | Site: TIBIA | Status: NON-FUNCTIONAL
Removed: 2023-07-26

## (undated) DEVICE — Device

## (undated) DEVICE — ELECTRODE PT RET AD L9FT HI MOIST COND ADH HYDRGEL CORDED

## (undated) DEVICE — BIT DRL L110MM DIA3.5MM QUIK CPL W/O STP REUSE

## (undated) DEVICE — KIT OR TURNOVER

## (undated) DEVICE — STAPLER SKIN H3.9MM WIRE DIA0.58MM CRWN 6.9MM 35 STPL ROT

## (undated) DEVICE — BANDAGE,GAUZE,BULKEE II,4.5"X4.1YD,STRL: Brand: MEDLINE

## (undated) DEVICE — STERILE POLYISOPRENE POWDER-FREE SURGICAL GLOVES: Brand: PROTEXIS

## (undated) DEVICE — BIT DRL L110MM DIA2.5MM G QUIK CPL W/O STP REUSE

## (undated) DEVICE — SOLUTION SCRB 4OZ 4% CHG CLN BASE FOR PT SKIN ANTISEPSIS

## (undated) DEVICE — SCREW EXT FIX L150MM DIA5MM THRD L150MM S STL SELF DRL MR

## (undated) DEVICE — POST EXT FIX DIA11MM STR OUTRIG MAG RESONANCE CONDITIONAL

## (undated) DEVICE — BANDAGE COMPR EXSANGUATION SGL LAYERED NO CLSR 9FT LEN 4IN W

## (undated) DEVICE — INTENDED FOR TISSUE SEPARATION, AND OTHER PROCEDURES THAT REQUIRE A SHARP SURGICAL BLADE TO PUNCTURE OR CUT.: Brand: BARD-PARKER SAFETY BLADES SIZE 15, STERILE

## (undated) DEVICE — BLADE CLIPPER GEN PURP NS

## (undated) DEVICE — WRAP COMPR W4INXL5YD NONSTERILE TAN SELF ADH COBAN

## (undated) DEVICE — Z DISCONTINUED KIT CLN UP BON SECOURS MARYV

## (undated) DEVICE — BIT DRL L180MM DIA2.5MM G QUIK CPL W/O STP REUSE

## (undated) DEVICE — PADDING CAST W4INXL4YD ST COT COHESIVE HND TEARABLE SPEC

## (undated) DEVICE — LIGHT HANDLE: Brand: DEVON

## (undated) DEVICE — SUTURE VCRL SZ 3-0 L27IN ABSRB UD L26MM SH 1/2 CIR J416H

## (undated) DEVICE — SUTURE ETHLN SZ 3-0 L18IN NONABSORBABLE BLK L19MM PC-5 3/8 1893G

## (undated) DEVICE — PREMIUM DRY TRAY LF: Brand: MEDLINE INDUSTRIES, INC.

## (undated) DEVICE — CLAMP EXT FIX L PIN 6 POS MAG RESONANCE CONDITIONAL